# Patient Record
Sex: FEMALE | Race: BLACK OR AFRICAN AMERICAN | NOT HISPANIC OR LATINO | URBAN - NONMETROPOLITAN AREA
[De-identification: names, ages, dates, MRNs, and addresses within clinical notes are randomized per-mention and may not be internally consistent; named-entity substitution may affect disease eponyms.]

---

## 2023-11-30 LAB
C TRACH RRNA SPEC DONR QL NAA+PROBE: POSITIVE
N GONORRHOEA DNA SPEC QL NAA+PROBE: POSITIVE

## 2023-12-18 LAB
BACTERIA SPEC AEROBE CULT: ABNORMAL
EXTERNAL ABO GROUPING: NORMAL
EXTERNAL ANTIBODY SCREEN: NORMAL
EXTERNAL HEMATOCRIT: 31 %
EXTERNAL HEMOGLOBIN: 10.4 G/DL
EXTERNAL HEPATITIS B SURFACE ANTIGEN: NEGATIVE
EXTERNAL PLATELET COUNT: 247 K/ΜL
EXTERNAL RH FACTOR: POSITIVE
EXTERNAL SYPHILIS RPR SCREEN: NORMAL
HCV AB S/CO SERPL IA: NEGATIVE
HIV 1+2 AB+HIV1 P24 AG SERPL QL IA: NORMAL
RUBV IGG SERPL IA-ACNC: 2.28

## 2024-01-24 ENCOUNTER — ANCILLARY ORDERS (OUTPATIENT)
Dept: OBSTETRICS AND GYNECOLOGY | Facility: CLINIC | Age: 24
End: 2024-01-24

## 2024-01-24 ENCOUNTER — INITIAL PRENATAL (OUTPATIENT)
Dept: OBSTETRICS AND GYNECOLOGY | Facility: CLINIC | Age: 24
End: 2024-01-24
Payer: COMMERCIAL

## 2024-01-24 VITALS — DIASTOLIC BLOOD PRESSURE: 70 MMHG | SYSTOLIC BLOOD PRESSURE: 104 MMHG | WEIGHT: 224 LBS

## 2024-01-24 DIAGNOSIS — Z34.82 PRENATAL CARE, SUBSEQUENT PREGNANCY, SECOND TRIMESTER: Primary | ICD-10-CM

## 2024-01-24 DIAGNOSIS — O36.80X0 ENCOUNTER TO DETERMINE FETAL VIABILITY OF PREGNANCY, SINGLE OR UNSPECIFIED FETUS: Primary | ICD-10-CM

## 2024-01-24 RX ORDER — PNV NO.95/FERROUS FUM/FOLIC AC 28MG-0.8MG
1 TABLET ORAL DAILY
Qty: 30 TABLET | Refills: 11 | Status: SHIPPED | OUTPATIENT
Start: 2024-01-24

## 2024-01-24 NOTE — LETTER
January 24, 2024       No Recipients    Patient: Carmelita Powers   YOB: 2000   Date of Visit: 1/24/2024     Dear Carmelita Powers:       Carmelita is a patient of mine and is currently 15 weeks pregnant. She said she is struggling at work with heavy lifting.  Due to pregnancy, I would like her to not lift anything more than 20 pounds.      If you have questions, please do not hesitate to call me.          Sincerely,        Ally Ocampo MD        CC:   No Recipients

## 2024-01-24 NOTE — PROGRESS NOTES
[facility]   HISTORY AND PHYSICAL  Subjective   Subjective     Chief Complaint:   Chief Complaint   Patient presents with    Initial Prenatal Visit     Patient here today as transfer from [facility] [place]. LMP 10/1023.   Patient reports she had a pap at NOB appointment at [facility] [place].        History of Present Illness  Carmelita Powers is a 23 y.o. female  who presents for new ob visit. Pt has had two visits in [place] but pt has moved here. 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 She is having SOB at work and LBP.  Pt with prior  at term without complications. Does not want NIPS/carrier screening.  Pt healthy.     Review of Systems   Constitutional:  Positive for appetite change and fatigue. Negative for activity change.   Respiratory:  Positive for shortness of breath. Negative for cough, chest tightness and wheezing.    Cardiovascular:  Negative for chest pain and palpitations.   Gastrointestinal: Negative.    Genitourinary: Negative.         Personal History     OB History    Para Term  AB Living   2 1 1 0 0 1   SAB IAB Ectopic Molar Multiple Live Births   0 0 0 0 0 0      # Outcome Date GA Lbr Marco A/2nd Weight Sex Delivery Anes PTL Lv   2 Current            1 Term 24   2863 g (6 lb 5 oz) M Vag-Spont        History reviewed. No pertinent past medical history.  Past Surgical History:   Procedure Laterality Date    TONSILLECTOMY         Home Medications:  prenatal vitamin 28-0.8    Allergies:  She has No Known Allergies.    Objective    Objective     Vitals:   BP: (104)/(70) 104/70    Physical Exam  deferred  Result Review    [unfilled]    Assessment & Plan   Assessment / Plan     Diagnoses and all orders for this visit:    1. Prenatal care, subsequent pregnancy, second trimester (Primary)  -     ABO / Rh  -     Ambulatory Referral to M/Perinatology  -     Antibody Screen  -     Chlamydia trachomatis, Neisseria gonorrhoeae, PCR w/ confirmation - Urine, Urine, Clean  Catch  -     CBC & Differential  -     Hepatitis B Surface Antigen  -     Hepatitis C Antibody  -     HIV-1 / O / 2 Ag / Antibody  -     RPR  -     Rubella Antibody, IgG  -     ToxASSURE Select 13 (MW) - Urine, Clean Catch  -     Urine Culture - Urine, Urine, Clean Catch  IUP at 15 weeks gestation by US today.  PNL today.  Note for work for no lifting more than 20 pounds. RTC 4 weeks. Declined NIPS/carrier screening.   Other orders  -     Prenatal Vit-Fe Fumarate-FA (prenatal vitamin 28-0.8) 28-0.8 MG tablet tablet; Take 1 tablet by mouth Daily.  Dispense: 30 tablet; Refill: 11        Discussion:   New OB Visit. US ordered today, reviewed and shows 15 1/7 weeks gestation, EDC 7/16/2024.  Prior uncomplicated pregnancy and vaginal delivery.  Feeling well.   New OB labs ordered today  Discussed OTC Unisom and B6  Discussed COVID vaccine, Tdap, and flu vaccine recommendations  Discussed ffDNA screening option and pt declined  Discussed normal prenatal routines  Questions answered      Return in about 4 weeks (around 2/21/2024) for PO for labs/prenatal records in [place] [facility].    Ally Ocampo MD

## 2024-01-29 ENCOUNTER — PATIENT OUTREACH (OUTPATIENT)
Dept: LABOR AND DELIVERY | Facility: HOSPITAL | Age: 24
End: 2024-01-29
Payer: COMMERCIAL

## 2024-01-29 ENCOUNTER — REFERRAL TRIAGE (OUTPATIENT)
Dept: LABOR AND DELIVERY | Facility: HOSPITAL | Age: 24
End: 2024-01-29
Payer: COMMERCIAL

## 2024-01-29 NOTE — OUTREACH NOTE
[facility]  Unable to Reach       Questions/Answers      Flowsheet Row Responses   Pending Outreach Confirm Patient Interest   Call Attempt First   Outcome No answer/busy, Left message, Embarkehart message sent to patient                Lorene Márquez RN  Maternity Nurse Navigator    1/29/2024, 15:07 CST

## 2024-01-29 NOTE — OUTREACH NOTE
[facility]  Enrollment    Current Estimated Gestational Age: 15w6d    Questions/Answers      Flowsheet Row Responses   Would like to participate? Yes   Date of Intake Visit 01/29/24            [facility]  Intake    Current Estimated Gestational Age: 15w6d    Intake Assessment      Flowsheet Row Responses   Best Method for Contacting Cell   Currently Employed Yes   Able to keep appointments as scheduled Yes   Gender(s) and Name(s) female   Do you have a dentist? Yes   Have you seen a dentist in the last 6 months Yes   Resources Presently Utilizing: None   Maternal Warning Signs Provided   Other: Provided   Other Education HANDS, How to find a dentist, How to find a pediatrician, How to find a primary care provider, Insurance benefits/Incentives, Meds to Beds, Mental Health Services, Smoking/Vaping Cessation, SNAP Benefits, Substance Use Disorder Treatment, Transportation Assistance, WIC Benefits            Learning Assessment      Flowsheet Row Responses   Relationship Patient   Does the learner have any barriers to learning? No Barriers   What is the preferred language of the learner for medical teaching? English   How does the learner prefer to learn new concepts? Listening, Reading, Demonstration, Pictures/Video            SDOH updated and reviewed with the patient during this program:  --     Alcohol Use: Not At Risk (1/29/2024)    AUDIT-C     Frequency of Alcohol Consumption: Never     Average Number of Drinks: Patient does not drink     Frequency of Binge Drinking: Never      --     Depression: Not at risk (1/29/2024)    PHQ-2     PHQ-2 Score: 0      --     Disabilities: Not At Risk (1/29/2024)    Disabilities     Concentrating, Remembering, or Making Decisions Difficulty: no     Doing Errands Independently Difficulty: no      --     Employment: Not At Risk (1/29/2024)    Employment     Do you want help finding or keeping work or a job?: I do not need or want help      Financial Resource  Strain: Low Risk  (1/29/2024)    Overall Financial Resource Strain (CARDIA)     Difficulty of Paying Living Expenses: Not very hard      --     Food Insecurity: No Food Insecurity (1/29/2024)    Hunger Vital Sign     Worried About Running Out of Food in the Last Year: Never true     Ran Out of Food in the Last Year: Never true      --     Health Literacy: Not At Risk (1/29/2024)    Education     Help with school or training?: No     Preferred Language: English      --     Housing Stability: Not At Risk (1/29/2024)    Housing Stability     Current Living Arrangements: apartment     Potentially Unsafe Housing Conditions: none      --     Interpersonal Safety: Unknown (1/29/2024)    Abuse Screen     Unsafe at Home or Work/School: no     Feels Threatened by Someone?: no     Does Anyone Keep You from Contacting Others or Doint Things Outside the Home?: no      --     Physical Activity: Inactive (1/29/2024)    Exercise Vital Sign     Days of Exercise per Week: 0 days     Minutes of Exercise per Session: 0 min      --     Social Connections: Not At Risk (1/29/2024)    Family and Community Support     Help with Day-to-Day Activities: I don't need any help     Lonely or Isolated: Never      --     Transportation Needs: No Transportation Needs (1/29/2024)    PRAPARE - Transportation     Lack of Transportation (Medical): No     Lack of Transportation (Non-Medical): No      --     Utilities: Not At Risk (1/29/2024)    [facility] Utilities     Threatened with loss of utilities: No   Resource letter and prenatal education sent to client in [facility].    Referral submitted to the following resources (verbal consent received to submit demographic information):     HANDS    Tobacco, Alcohol, and Drug History     reports that she has never smoked. She has never used smokeless tobacco.   reports that she does not currently use alcohol.   reports no history of drug use.    Lorene Márquez RN  Maternity Nurse Navigator    1/29/2024, 15:22  IVA NGUYEN - RN  Maternity Nurse Navigator    1/29/2024, 15:22 CST

## 2024-02-21 ENCOUNTER — ROUTINE PRENATAL (OUTPATIENT)
Dept: OBSTETRICS AND GYNECOLOGY | Age: 24
End: 2024-02-21
Payer: COMMERCIAL

## 2024-02-21 DIAGNOSIS — R51.9 HEADACHE IN PREGNANCY, ANTEPARTUM: ICD-10-CM

## 2024-02-21 DIAGNOSIS — O26.899 HEADACHE IN PREGNANCY, ANTEPARTUM: ICD-10-CM

## 2024-02-21 DIAGNOSIS — O10.919 CHRONIC HYPERTENSION DURING PREGNANCY: ICD-10-CM

## 2024-02-21 DIAGNOSIS — Z3A.19 19 WEEKS GESTATION OF PREGNANCY: Primary | ICD-10-CM

## 2024-02-21 DIAGNOSIS — Z71.85 IMMUNIZATION COUNSELING: ICD-10-CM

## 2024-02-21 DIAGNOSIS — Z34.82 MULTIGRAVIDA IN SECOND TRIMESTER: ICD-10-CM

## 2024-02-21 DIAGNOSIS — Z28.21 INFLUENZA VACCINATION DECLINED: ICD-10-CM

## 2024-02-21 PROBLEM — Z34.90 PREGNANCY: Status: ACTIVE | Noted: 2024-02-21

## 2024-02-21 LAB
GLUCOSE UR STRIP-MCNC: NEGATIVE MG/DL
PROT UR STRIP-MCNC: ABNORMAL MG/DL

## 2024-02-21 RX ORDER — METOCLOPRAMIDE 10 MG/1
10 TABLET ORAL 4 TIMES DAILY
Qty: 120 TABLET | Refills: 1 | Status: SHIPPED | OUTPATIENT
Start: 2024-02-21

## 2024-02-21 RX ORDER — ASPIRIN 81 MG/1
81 TABLET ORAL DAILY
Qty: 90 TABLET | Refills: 3 | Status: SHIPPED | OUTPATIENT
Start: 2024-02-21

## 2024-02-26 VITALS
BODY MASS INDEX: 38.41 KG/M2 | HEIGHT: 64 IN | WEIGHT: 225 LBS | SYSTOLIC BLOOD PRESSURE: 128 MMHG | DIASTOLIC BLOOD PRESSURE: 76 MMHG

## 2024-02-26 PROBLEM — O10.919 CHRONIC HYPERTENSION IN OBSTETRIC CONTEXT: Status: ACTIVE | Noted: 2024-02-26

## 2024-03-20 ENCOUNTER — TELEPHONE (OUTPATIENT)
Dept: OBSTETRICS AND GYNECOLOGY | Age: 24
End: 2024-03-20
Payer: COMMERCIAL

## 2024-03-28 ENCOUNTER — HOSPITAL ENCOUNTER (EMERGENCY)
Facility: HOSPITAL | Age: 24
Discharge: HOME | End: 2024-03-28
Attending: EMERGENCY MEDICINE
Payer: COMMERCIAL

## 2024-03-28 VITALS
DIASTOLIC BLOOD PRESSURE: 96 MMHG | SYSTOLIC BLOOD PRESSURE: 134 MMHG | OXYGEN SATURATION: 100 % | WEIGHT: 226.5 LBS | BODY MASS INDEX: 38.67 KG/M2 | RESPIRATION RATE: 18 BRPM | TEMPERATURE: 98.3 F | HEIGHT: 64 IN | HEART RATE: 79 BPM

## 2024-03-28 DIAGNOSIS — R11.2 NAUSEA AND VOMITING, UNSPECIFIED VOMITING TYPE: ICD-10-CM

## 2024-03-28 DIAGNOSIS — N39.0 ACUTE UTI (URINARY TRACT INFECTION): Primary | ICD-10-CM

## 2024-03-28 DIAGNOSIS — Z34.90 PREGNANCY, UNSPECIFIED GESTATIONAL AGE: ICD-10-CM

## 2024-03-28 LAB
ALBUMIN SERPL-MCNC: 3.4 G/DL (ref 3.5–5.2)
ALBUMIN/GLOB SERPL: 1 G/DL
ALP SERPL-CCNC: 55 U/L (ref 39–117)
ALT SERPL W P-5'-P-CCNC: 6 U/L (ref 1–33)
ANION GAP SERPL CALCULATED.3IONS-SCNC: 10 MMOL/L (ref 5–15)
AST SERPL-CCNC: 12 U/L (ref 1–32)
BACTERIA UR QL AUTO: ABNORMAL /HPF
BILIRUB SERPL-MCNC: 0.4 MG/DL (ref 0–1.2)
BILIRUB UR QL STRIP: NEGATIVE
BUN SERPL-MCNC: 8 MG/DL (ref 6–20)
BUN/CREAT SERPL: 10.8 (ref 7–25)
CALCIUM SPEC-SCNC: 9.1 MG/DL (ref 8.6–10.5)
CHLORIDE SERPL-SCNC: 102 MMOL/L (ref 98–107)
CLARITY UR: ABNORMAL
CO2 SERPL-SCNC: 24 MMOL/L (ref 22–29)
COLOR UR: YELLOW
CREAT SERPL-MCNC: 0.74 MG/DL (ref 0.57–1)
DEPRECATED RDW RBC AUTO: 38.7 FL (ref 37–54)
EGFRCR SERPLBLD CKD-EPI 2021: 117.5 ML/MIN/1.73
EOSINOPHIL # BLD MANUAL: 0.2 10*3/MM3 (ref 0–0.4)
EOSINOPHIL NFR BLD MANUAL: 2 % (ref 0.3–6.2)
ERYTHROCYTE [DISTWIDTH] IN BLOOD BY AUTOMATED COUNT: 13.7 % (ref 12.3–15.4)
GLOBULIN UR ELPH-MCNC: 3.3 GM/DL
GLUCOSE SERPL-MCNC: 81 MG/DL (ref 65–99)
GLUCOSE UR STRIP-MCNC: NEGATIVE MG/DL
HCT VFR BLD AUTO: 34 % (ref 34–46.6)
HGB BLD-MCNC: 10.9 G/DL (ref 12–15.9)
HGB UR QL STRIP.AUTO: NEGATIVE
HYALINE CASTS UR QL AUTO: ABNORMAL /LPF
KETONES UR QL STRIP: ABNORMAL
LEUKOCYTE ESTERASE UR QL STRIP.AUTO: ABNORMAL
LYMPHOCYTES # BLD MANUAL: 1.1 10*3/MM3 (ref 0.7–3.1)
LYMPHOCYTES NFR BLD MANUAL: 2 % (ref 5–12)
MCH RBC QN AUTO: 24.8 PG (ref 26.6–33)
MCHC RBC AUTO-ENTMCNC: 32.1 G/DL (ref 31.5–35.7)
MCV RBC AUTO: 77.3 FL (ref 79–97)
MICROCYTES BLD QL: ABNORMAL
MONOCYTES # BLD: 0.2 10*3/MM3 (ref 0.1–0.9)
NEUTROPHILS # BLD AUTO: 8.29 10*3/MM3 (ref 1.7–7)
NEUTROPHILS NFR BLD MANUAL: 84.7 % (ref 42.7–76)
NITRITE UR QL STRIP: NEGATIVE
OVALOCYTES BLD QL SMEAR: ABNORMAL
PH UR STRIP.AUTO: 7 [PH] (ref 5–8)
PLAT MORPH BLD: NORMAL
PLATELET # BLD AUTO: 227 10*3/MM3 (ref 140–450)
PMV BLD AUTO: 12 FL (ref 6–12)
POIKILOCYTOSIS BLD QL SMEAR: ABNORMAL
POTASSIUM SERPL-SCNC: 3.6 MMOL/L (ref 3.5–5.2)
PROT SERPL-MCNC: 6.7 G/DL (ref 6–8.5)
PROT UR QL STRIP: ABNORMAL
RBC # BLD AUTO: 4.4 10*6/MM3 (ref 3.77–5.28)
RBC # UR STRIP: ABNORMAL /HPF
REF LAB TEST METHOD: ABNORMAL
SODIUM SERPL-SCNC: 136 MMOL/L (ref 136–145)
SP GR UR STRIP: 1.03 (ref 1–1.03)
SQUAMOUS #/AREA URNS HPF: ABNORMAL /HPF
UROBILINOGEN UR QL STRIP: ABNORMAL
VARIANT LYMPHS NFR BLD MANUAL: 5.1 % (ref 0–5)
VARIANT LYMPHS NFR BLD MANUAL: 6.1 % (ref 19.6–45.3)
WBC # UR STRIP: ABNORMAL /HPF
WBC CLUMPS # UR AUTO: ABNORMAL /HPF
WBC MORPH BLD: NORMAL
WBC NRBC COR # BLD AUTO: 9.79 10*3/MM3 (ref 3.4–10.8)

## 2024-03-28 PROCEDURE — 99283 EMERGENCY DEPT VISIT LOW MDM: CPT

## 2024-03-28 PROCEDURE — 25010000002 CEFTRIAXONE PER 250 MG: Performed by: EMERGENCY MEDICINE

## 2024-03-28 PROCEDURE — 80053 COMPREHEN METABOLIC PANEL: CPT | Performed by: EMERGENCY MEDICINE

## 2024-03-28 PROCEDURE — 85025 COMPLETE CBC W/AUTO DIFF WBC: CPT | Performed by: EMERGENCY MEDICINE

## 2024-03-28 PROCEDURE — 25810000003 SODIUM CHLORIDE 0.9 % SOLUTION: Performed by: EMERGENCY MEDICINE

## 2024-03-28 PROCEDURE — 81001 URINALYSIS AUTO W/SCOPE: CPT | Performed by: EMERGENCY MEDICINE

## 2024-03-28 PROCEDURE — 96365 THER/PROPH/DIAG IV INF INIT: CPT

## 2024-03-28 PROCEDURE — P9612 CATHETERIZE FOR URINE SPEC: HCPCS

## 2024-03-28 PROCEDURE — 85007 BL SMEAR W/DIFF WBC COUNT: CPT | Performed by: EMERGENCY MEDICINE

## 2024-03-28 PROCEDURE — 87086 URINE CULTURE/COLONY COUNT: CPT | Performed by: EMERGENCY MEDICINE

## 2024-03-28 PROCEDURE — 25010000002 ONDANSETRON PER 1 MG: Performed by: EMERGENCY MEDICINE

## 2024-03-28 PROCEDURE — 96375 TX/PRO/DX INJ NEW DRUG ADDON: CPT

## 2024-03-28 RX ORDER — CEFDINIR 300 MG/1
300 CAPSULE ORAL 2 TIMES DAILY
Qty: 20 CAPSULE | Refills: 0 | Status: SHIPPED | OUTPATIENT
Start: 2024-03-28 | End: 2024-04-07

## 2024-03-28 RX ORDER — SODIUM CHLORIDE 0.9 % (FLUSH) 0.9 %
10 SYRINGE (ML) INJECTION AS NEEDED
Status: DISCONTINUED | OUTPATIENT
Start: 2024-03-28 | End: 2024-03-28 | Stop reason: HOSPADM

## 2024-03-28 RX ORDER — ONDANSETRON 2 MG/ML
4 INJECTION INTRAMUSCULAR; INTRAVENOUS ONCE
Status: COMPLETED | OUTPATIENT
Start: 2024-03-28 | End: 2024-03-28

## 2024-03-28 RX ADMIN — SODIUM CHLORIDE 1000 ML: 9 INJECTION, SOLUTION INTRAVENOUS at 07:48

## 2024-03-28 RX ADMIN — SODIUM CHLORIDE 1000 MG: 900 INJECTION INTRAVENOUS at 10:22

## 2024-03-28 RX ADMIN — ONDANSETRON 4 MG: 2 INJECTION INTRAMUSCULAR; INTRAVENOUS at 07:49

## 2024-03-28 NOTE — ED PROVIDER NOTES
Subjective   History of Present Illness  Patient with vomiting and nausea no abdominal pain she is pregnant denies any diarrhea denies any chest pain denies shortness of breath.  Came to the ER for couple complaints.  She denies any chest pain denies any shortness of breath denies any dizziness.  Just nausea and the vomiting.  And feeling weak.  No vaginal discharge no vaginal bleeding    Vomiting  The primary symptoms include nausea and vomiting. Primary symptoms do not include fever, weight loss, fatigue, abdominal pain, diarrhea, melena, hematemesis, jaundice, hematochezia, dysuria, myalgias, arthralgias or rash. The illness began yesterday.   The illness does not include chills, anorexia, bloating, constipation or back pain. Associated medical issues do not include inflammatory bowel disease, GERD, liver disease, gastric bypass, bowel resection or irritable bowel syndrome.   Nausea  The primary symptoms include nausea and vomiting. Primary symptoms do not include fever, weight loss, fatigue, abdominal pain, diarrhea, melena, hematemesis, jaundice, hematochezia, dysuria, myalgias, arthralgias or rash.   The illness does not include chills, anorexia, bloating, constipation or back pain. Associated medical issues do not include inflammatory bowel disease, GERD, liver disease, gastric bypass, bowel resection or irritable bowel syndrome.       Review of Systems   Constitutional: Negative.  Negative for activity change, appetite change, chills, diaphoresis, fatigue, fever and weight loss.   HENT:  Negative for congestion, drooling, ear pain, facial swelling, hearing loss, sinus pressure and sore throat.    Eyes: Negative.  Negative for discharge.   Gastrointestinal:  Positive for nausea and vomiting. Negative for abdominal distention, abdominal pain, anorexia, bloating, blood in stool, constipation, diarrhea, hematemesis, hematochezia, jaundice and melena.   Endocrine: Negative.  Negative for cold intolerance, heat  intolerance, polydipsia, polyphagia and polyuria.   Genitourinary: Negative.  Negative for dysuria, flank pain and urgency.   Musculoskeletal: Negative.  Negative for arthralgias, back pain, myalgias and neck stiffness.   Skin: Negative.  Negative for color change, pallor and rash.   Allergic/Immunologic: Negative.    Neurological: Negative.  Negative for dizziness, seizures, speech difficulty, weakness, numbness and headaches.   Hematological: Negative.  Negative for adenopathy.   All other systems reviewed and are negative.      Past Medical History:   Diagnosis Date    Migraine 1/19/24    I get migraines really bad all throughout the day and night       No Known Allergies    Past Surgical History:   Procedure Laterality Date    TONSILLECTOMY         Family History   Problem Relation Age of Onset    Breast cancer Neg Hx     Ovarian cancer Neg Hx     Uterine cancer Neg Hx     Colon cancer Neg Hx     Melanoma Neg Hx        Social History     Socioeconomic History    Marital status: Single   Tobacco Use    Smoking status: Never    Smokeless tobacco: Never   Vaping Use    Vaping status: Never Used   Substance and Sexual Activity    Alcohol use: Not Currently    Drug use: Never    Sexual activity: Not Currently     Partners: Male     Birth control/protection: None           Objective   Physical Exam  Vitals and nursing note reviewed. Exam conducted with a chaperone present.   Constitutional:       General: She is not in acute distress.     Appearance: Normal appearance.   HENT:      Head: Normocephalic.      Nose: Nose normal.      Mouth/Throat:      Mouth: Mucous membranes are moist.   Eyes:      Pupils: Pupils are equal, round, and reactive to light.   Cardiovascular:      Rate and Rhythm: Normal rate. No extrasystoles are present.     Chest Wall: PMI is not displaced.      Pulses: Normal pulses.      Heart sounds: Normal heart sounds. No murmur heard.     No gallop.   Pulmonary:      Effort: Pulmonary effort is  normal. No tachypnea, respiratory distress or retractions.      Breath sounds: No stridor or decreased air movement. No decreased breath sounds or rales.   Chest:      Chest wall: No tenderness.   Abdominal:      General: Abdomen is protuberant. Bowel sounds are normal. There is no distension or abdominal bruit.      Palpations: Abdomen is soft. There is no mass or pulsatile mass.      Tenderness: There is no abdominal tenderness. There is no right CVA tenderness, left CVA tenderness, guarding or rebound. Negative signs include Olvera's sign and Rovsing's sign.   Musculoskeletal:      Cervical back: Normal range of motion. No rigidity.      Right lower leg: No edema.      Left lower leg: No edema.   Skin:     General: Skin is warm.      Capillary Refill: Capillary refill takes less than 2 seconds.      Coloration: Skin is not cyanotic, mottled or pale.   Neurological:      General: No focal deficit present.      Mental Status: She is alert and oriented to person, place, and time. Mental status is at baseline.      Cranial Nerves: No cranial nerve deficit.      Sensory: No sensory deficit.      Motor: No weakness.      Coordination: Coordination normal.   Psychiatric:         Mood and Affect: Mood normal.         Thought Content: Thought content normal.         Procedures           ED Course  ED Course as of 03/28/24 1012   Thu Mar 28, 2024   1005 Patient has not vomited since she has been in the ED white cell count is normal chemistry within normal limits.  No dehydration UTI is noted on the specimen.  Heart tones are going to be obtained Dr. Calderón  came and saw the patient recommendation is the patient can be discharged home [TS]      ED Course User Index  [TS] Ricardo Byrne MD                                             Medical Decision Making  Nausea and vomiting patient is pregnant 24 weeks.  No history of diarrhea no history of melena no hematochezia no vaginal bleeding.  Denies any chest pain or shortness  of breath.  There is no fever.  There is no headaches.  No history of recreational drug use.  Patient not hypertensive there is no history of eclampsia or preeclampsia in her.    Problems Addressed:  Acute UTI (urinary tract infection): acute illness or injury     Details: Acute UTI no pyelonephritis no sepsis.  Nausea and vomiting, unspecified vomiting type:     Details: Secondary probably to UTI also she is pregnant already on Reglan.  Was given antiemetics in the ED and IV fluids.  Pregnancy, unspecified gestational age:     Details: Patient was will get fetal heart tones and discharged home after that.    Risk  Prescription drug management.  Risk Details: No further vomiting in the ED.  No nausea no vomiting at this time.  Does not have any evidence of sepsis no pyelonephritis hemodynamically stable.  Has got a UTI no abdominal pain seen by GYN.  Patient was discharged home.  Recommended in the ER for any worsening symptoms.  Denies any abdominal pain        Final diagnoses:   Acute UTI (urinary tract infection)   Nausea and vomiting, unspecified vomiting type   Pregnancy, unspecified gestational age       ED Disposition  ED Disposition       ED Disposition   Discharge    Condition   Stable    Comment   --               No follow-up provider specified.       Medication List        New Prescriptions      cefdinir 300 MG capsule  Commonly known as: OMNICEF  Take 1 capsule by mouth 2 (Two) Times a Day for 10 days.               Where to Get Your Medications        These medications were sent to [facility]/pharmacy #[id number] - [place] - [address] AT ACROSS FROM [address] - [phone]  - [phone]   [address] [facility] [address]      Phone: [phone]   cefdinir 300 MG capsule            Ricardo Byrne MD  03/28/24 1012

## 2024-03-28 NOTE — NON STRESS TEST
Obstetrical Non-stress Test Interpretation     Name:  Carmelita Powers  MRN: [id number]    24 y.o. female  at 24w2d    Indication: nausea and vomiting      Baseline: Normal 110-160 bpm  Variability:   Moderate/Normal (amplitude 6-25 bpm)  Accelerations: Present (<32 weeks) 10 x 10 bpm   Decelerations: Absent   Contractions:  Absent       Impression:  Category I       Reza Calderón MD  3/28/2024  11:48 CDT

## 2024-03-29 LAB — BACTERIA SPEC AEROBE CULT: NORMAL

## 2024-04-18 ENCOUNTER — ROUTINE PRENATAL (OUTPATIENT)
Age: 24
End: 2024-04-18
Payer: COMMERCIAL

## 2024-04-18 VITALS — WEIGHT: 226 LBS | BODY MASS INDEX: 38.79 KG/M2 | DIASTOLIC BLOOD PRESSURE: 84 MMHG | SYSTOLIC BLOOD PRESSURE: 138 MMHG

## 2024-04-18 DIAGNOSIS — O10.919 CHRONIC HYPERTENSION DURING PREGNANCY: ICD-10-CM

## 2024-04-18 DIAGNOSIS — Z71.85 IMMUNIZATION COUNSELING: ICD-10-CM

## 2024-04-18 DIAGNOSIS — Z11.3 SCREENING EXAMINATION FOR STI: ICD-10-CM

## 2024-04-18 DIAGNOSIS — Z13.0 SCREENING FOR DEFICIENCY ANEMIA: ICD-10-CM

## 2024-04-18 DIAGNOSIS — Z3A.27 27 WEEKS GESTATION OF PREGNANCY: Primary | ICD-10-CM

## 2024-04-18 DIAGNOSIS — Z34.82 MULTIGRAVIDA IN SECOND TRIMESTER: ICD-10-CM

## 2024-04-18 DIAGNOSIS — Z13.1 SPECIAL SCREENING EXAMINATION FOR DIABETES MELLITUS: ICD-10-CM

## 2024-04-18 LAB
GLUCOSE UR STRIP-MCNC: NEGATIVE MG/DL
PROT UR STRIP-MCNC: ABNORMAL MG/DL

## 2024-04-18 PROCEDURE — 0502F SUBSEQUENT PRENATAL CARE: CPT | Performed by: ADVANCED PRACTICE MIDWIFE

## 2024-04-18 RX ORDER — VITAMIN A, ASCORBIC ACID, CHOLECALCIFEROL, TOCOPHEROL, THIAMINE MONONITRATE, RIBOFLAVIN, PYRIDOXINE, FOLIC ACID, CYANOCOBALAMIN, CALCIUM CARBONATE, FERROUS FUMARATE, ZINC OXIDE, CUPRIC OXIDE, NIACINAMIDE, AND FISH OIL 27-1-250MG
1 KIT ORAL DAILY
Qty: 30 EACH | Refills: 5 | Status: SHIPPED | OUTPATIENT
Start: 2024-04-18

## 2024-04-18 NOTE — PROGRESS NOTES
Reason for visit: Routine OB visit at 27w2d     CC:  She is feeling good today. She has been drinking a lot of sodas this pregnancy, and had a UTI a few weeks ago. Endorses good fetal movement. Denies VB, LOF, contractions, h/a, visual changes, and right upper quadrant pain.     ROS: All systems reviewed and are negative.    Wt 103 kg (226 lb) lb for a TWG of -4.536 kg (-10 lb), /84, FHTs 144, FH 30 cm  Urine today and reviewed: negative glucose, 1+ protein    20-week Anatomy scan: EFW 39%, normal; anterior placenta without evidence of placenta previa    Exam:  General Appearance:  No visualized signs of distress. Normal mood and behavior  Cardiorespiratory: HR str and reg. Lungs clear. Resp even and unlabored.  Abdomen: is soft and nontender. No CVA tenderness. Uterus is consistent with estimated gestational age.  Ext: No edema. Calves non-tender.     Impression  Diagnoses and all orders for this visit:    1. 27 weeks gestation of pregnancy (Primary)  -     POC Urinalysis Dipstick  -     Gestational Screen 1 Hr ([facility])  -     Hemoglobin  -     RPR  -     CBC & Differential  -     Comprehensive Metabolic Panel  -     Uric Acid  -     Lactate Dehydrogenase  -     Protein / Creatinine Ratio, Urine - Urine, Clean Catch  -     Prenatal Vit-Fe Fum-FA-Omega (PNV Prenatal Plus Multivit+DHA) 27-1 & 312 MG misc; Take 1 tablet by mouth Daily.  Dispense: 30 each; Refill: 5    2. Multigravida in second trimester  Discussed third trimester of pregnancy, including discomforts and measures of support,  labor warnings, preeclampsia warnings, and signs and symptoms to report. Discussed glucose and hemoglobin screenings today in the clinic. Labs ordered today. Patient to notify provider and/or come to the hospital for vaginal bleeding, leaking of fluid, contractions, or other concerns. Third Trimester of Pregnancy and Alternative Pain Management During Labor and Delivery videos included in the AVS.  -     POC  Urinalysis Dipstick    3. Chronic hypertension during pregnancy  1+ proteinuria today. Plan for labs today. No signs of preeclampsia noted. Discussed blood pressure warning signs.   -     CBC & Differential  -     Comprehensive Metabolic Panel  -     Uric Acid  -     Lactate Dehydrogenase  -     Protein / Creatinine Ratio, Urine - Urine, Clean Catch    4. Special screening examination for diabetes mellitus  -     Gestational Screen 1 Hr ([facility])    5. Screening for deficiency anemia  -     Hemoglobin    6. Screening examination for STI  -     RPR    7. Immunization counseling  Reviewed Tdap immunization recommendations during pregnancy. She will consider receiving the Tdap immunization during an upcoming prenatal visit. Tdap (Tetanus, Diptheria, Pertussis) Vaccine: What You Need to Know (VIS) education included in the AVS.        Return to the office in 2 weeks for routine prenatal visit with Dr. Ocampo as scheduled and as needed with concerns.        This note has been signed electronically.    Swati Gallo, DNP, APRN, CNM, RNC-OB

## 2024-04-19 LAB
ALBUMIN SERPL-MCNC: 3.3 G/DL (ref 4–5)
ALBUMIN/GLOB SERPL: 1.3 {RATIO} (ref 1.2–2.2)
ALP SERPL-CCNC: 58 IU/L (ref 44–121)
ALT SERPL-CCNC: 8 IU/L (ref 0–32)
AST SERPL-CCNC: 12 IU/L (ref 0–40)
BASOPHILS # BLD AUTO: 0 X10E3/UL (ref 0–0.2)
BASOPHILS NFR BLD AUTO: 0 %
BILIRUB SERPL-MCNC: 0.3 MG/DL (ref 0–1.2)
BUN SERPL-MCNC: 7 MG/DL (ref 6–20)
BUN/CREAT SERPL: 11 (ref 9–23)
CALCIUM SERPL-MCNC: 8.8 MG/DL (ref 8.7–10.2)
CHLORIDE SERPL-SCNC: 103 MMOL/L (ref 96–106)
CO2 SERPL-SCNC: 21 MMOL/L (ref 20–29)
CREAT SERPL-MCNC: 0.61 MG/DL (ref 0.57–1)
CREAT UR-MCNC: 187.8 MG/DL
EGFRCR SERPLBLD CKD-EPI 2021: 130 ML/MIN/1.73
EOSINOPHIL # BLD AUTO: 0.1 X10E3/UL (ref 0–0.4)
EOSINOPHIL NFR BLD AUTO: 1 %
ERYTHROCYTE [DISTWIDTH] IN BLOOD BY AUTOMATED COUNT: 13.3 % (ref 11.7–15.4)
GLOBULIN SER CALC-MCNC: 2.5 G/DL (ref 1.5–4.5)
GLUCOSE 1H P 50 G GLC PO SERPL-MCNC: 109 MG/DL (ref 70–139)
GLUCOSE SERPL-MCNC: 107 MG/DL (ref 70–99)
HCT VFR BLD AUTO: 31.3 % (ref 34–46.6)
HGB BLD-MCNC: 10.3 G/DL (ref 11.1–15.9)
HGB BLD-MCNC: 9.9 G/DL (ref 11.1–15.9)
IMM GRANULOCYTES # BLD AUTO: 0 X10E3/UL (ref 0–0.1)
IMM GRANULOCYTES NFR BLD AUTO: 0 %
LDH SERPL L TO P-CCNC: 136 IU/L (ref 119–226)
LYMPHOCYTES # BLD AUTO: 1.6 X10E3/UL (ref 0.7–3.1)
LYMPHOCYTES NFR BLD AUTO: 17 %
MCH RBC QN AUTO: 24.9 PG (ref 26.6–33)
MCHC RBC AUTO-ENTMCNC: 31.6 G/DL (ref 31.5–35.7)
MCV RBC AUTO: 79 FL (ref 79–97)
MONOCYTES # BLD AUTO: 0.5 X10E3/UL (ref 0.1–0.9)
MONOCYTES NFR BLD AUTO: 6 %
NEUTROPHILS # BLD AUTO: 7.2 X10E3/UL (ref 1.4–7)
NEUTROPHILS NFR BLD AUTO: 76 %
PLATELET # BLD AUTO: 203 X10E3/UL (ref 150–450)
POTASSIUM SERPL-SCNC: 3.1 MMOL/L (ref 3.5–5.2)
PROT SERPL-MCNC: 5.8 G/DL (ref 6–8.5)
PROT UR-MCNC: 32.3 MG/DL
PROT/CREAT UR: 172 MG/G CREAT (ref 0–200)
RBC # BLD AUTO: 3.98 X10E6/UL (ref 3.77–5.28)
RPR SER QL: NON REACTIVE
SODIUM SERPL-SCNC: 136 MMOL/L (ref 134–144)
URATE SERPL-MCNC: 4.2 MG/DL (ref 2.6–6.2)
WBC # BLD AUTO: 9.5 X10E3/UL (ref 3.4–10.8)

## 2024-04-21 DIAGNOSIS — O99.019 MATERNAL ANEMIA IN PREGNANCY, ANTEPARTUM: Primary | ICD-10-CM

## 2024-04-23 ENCOUNTER — PATIENT OUTREACH (OUTPATIENT)
Dept: LABOR AND DELIVERY | Facility: HOSPITAL | Age: 24
End: 2024-04-23
Payer: COMMERCIAL

## 2024-04-23 NOTE — OUTREACH NOTE
[facility]  Check-In    Current Estimated Gestational Age: 28w0d    EPDS questionnaire sent to Carmelita in [facility] prior to our telephone check-in this week.     Lorene Márquez RN  Maternity Nurse Navigator    4/23/2024, 16:26 CDT

## 2024-04-24 ENCOUNTER — PATIENT OUTREACH (OUTPATIENT)
Dept: LABOR AND DELIVERY | Facility: HOSPITAL | Age: 24
End: 2024-04-24
Payer: COMMERCIAL

## 2024-04-24 NOTE — OUTREACH NOTE
[facility]  Check-In    Current Estimated Gestational Age: 28w1d      Questions/Answers      Flowsheet Row Responses   Best Method for Contacting Cell   Currently Employed Yes   Able to keep appointments as scheduled Yes   Gender(s) and Name(s) female   Baby Active/Feeling Fetal Movemen Yes   How are you presently feeling? states she is taking extra iron now related to anemia.  reviewed with her what stool softners that she can take if she had any constipation from the extra iron.  encouraged Carmelita to increase water intake.   New Diagnosis Anemia   Resource/Environmental Concerns None   Do you have any questions related to your care experience, your pregnancy, plans for delivery, any concerns, etc? No          Resource letter sent to Carmelita in [place] [employer].     Lorene Márquez RN  Maternity Nurse Navigator    4/24/2024, 12:47 CDT

## 2024-04-25 ENCOUNTER — PATIENT OUTREACH (OUTPATIENT)
Dept: LABOR AND DELIVERY | Facility: HOSPITAL | Age: 24
End: 2024-04-25
Payer: COMMERCIAL

## 2024-04-25 NOTE — OUTREACH NOTE
[facility]  Check-In    Current Estimated Gestational Age: 28w2d      Questions/Answers      Flowsheet Row Responses   Best Method for Contacting Cell   Currently Employed Yes   Able to keep appointments as scheduled Yes   Gender(s) and Name(s) female   How are you presently feeling? Carmelita called to schedule a tour of the OB unit.  We scheduled for May 30th after her 1 PM OB appt.  She was instructed to call my office number [phone] once she is done at her OB appt and I will meet her off the 2nd floor elevators.            Lorene Márquez RN  Maternity Nurse Navigator    4/25/2024, 08:23 CDT

## 2024-05-01 ENCOUNTER — ROUTINE PRENATAL (OUTPATIENT)
Age: 24
End: 2024-05-01
Payer: COMMERCIAL

## 2024-05-01 VITALS — WEIGHT: 225.4 LBS | DIASTOLIC BLOOD PRESSURE: 80 MMHG | SYSTOLIC BLOOD PRESSURE: 132 MMHG | BODY MASS INDEX: 38.69 KG/M2

## 2024-05-01 DIAGNOSIS — Z3A.29 29 WEEKS GESTATION OF PREGNANCY: Primary | ICD-10-CM

## 2024-05-01 DIAGNOSIS — O99.019 MATERNAL ANEMIA IN PREGNANCY, ANTEPARTUM: ICD-10-CM

## 2024-05-01 DIAGNOSIS — O10.919 CHRONIC HYPERTENSION DURING PREGNANCY: ICD-10-CM

## 2024-05-01 PROCEDURE — 90715 TDAP VACCINE 7 YRS/> IM: CPT | Performed by: OBSTETRICS & GYNECOLOGY

## 2024-05-01 PROCEDURE — 0502F SUBSEQUENT PRENATAL CARE: CPT | Performed by: OBSTETRICS & GYNECOLOGY

## 2024-05-01 PROCEDURE — 90471 IMMUNIZATION ADMIN: CPT | Performed by: OBSTETRICS & GYNECOLOGY

## 2024-05-01 NOTE — PROGRESS NOTES
No complaints. No PIH symptoms. GFM. Denies VB, LOF, ctx. Tdap today.    /80   Wt 102 kg (225 lb 6.4 oz)   LMP 10/10/2023 (Exact Date)   BMI 38.69 kg/m²    FHTs 144  Urine unable to void    Diagnoses and all orders for this visit:    1. 29 weeks gestation of pregnancy (Primary)  -     POC Urinalysis Dipstick    2. Chronic hypertension during pregnancy  CHTN no meds. BP stable. US for growth next visit. RTC 2 weeks.   3. Maternal anemia in pregnancy, antepartum  Hg 9.7 started on po FeSO4.  Recheck CBC next visit.

## 2024-05-16 ENCOUNTER — ROUTINE PRENATAL (OUTPATIENT)
Age: 24
End: 2024-05-16
Payer: COMMERCIAL

## 2024-05-16 VITALS — SYSTOLIC BLOOD PRESSURE: 118 MMHG | BODY MASS INDEX: 39.31 KG/M2 | WEIGHT: 229 LBS | DIASTOLIC BLOOD PRESSURE: 84 MMHG

## 2024-05-16 DIAGNOSIS — O10.919 CHRONIC HYPERTENSION DURING PREGNANCY: ICD-10-CM

## 2024-05-16 DIAGNOSIS — O99.019 MATERNAL ANEMIA IN PREGNANCY, ANTEPARTUM: ICD-10-CM

## 2024-05-16 DIAGNOSIS — O98.219 ANTEPARTUM GONORRHEA: ICD-10-CM

## 2024-05-16 DIAGNOSIS — Z3A.31 31 WEEKS GESTATION OF PREGNANCY: Primary | ICD-10-CM

## 2024-05-16 DIAGNOSIS — Z34.83 MULTIGRAVIDA IN THIRD TRIMESTER: ICD-10-CM

## 2024-05-16 DIAGNOSIS — R12 HEARTBURN DURING PREGNANCY IN THIRD TRIMESTER: ICD-10-CM

## 2024-05-16 DIAGNOSIS — O98.819 CHLAMYDIA INFECTION DURING PREGNANCY: ICD-10-CM

## 2024-05-16 DIAGNOSIS — A74.9 CHLAMYDIA INFECTION DURING PREGNANCY: ICD-10-CM

## 2024-05-16 DIAGNOSIS — O26.893 HEARTBURN DURING PREGNANCY IN THIRD TRIMESTER: ICD-10-CM

## 2024-05-16 LAB
GLUCOSE UR STRIP-MCNC: NEGATIVE MG/DL
PROT UR STRIP-MCNC: ABNORMAL MG/DL

## 2024-05-16 PROCEDURE — 0502F SUBSEQUENT PRENATAL CARE: CPT | Performed by: ADVANCED PRACTICE MIDWIFE

## 2024-05-16 RX ORDER — FERROUS SULFATE 324(65)MG
324 TABLET, DELAYED RELEASE (ENTERIC COATED) ORAL
Qty: 30 TABLET | Refills: 3 | Status: SHIPPED | OUTPATIENT
Start: 2024-05-16

## 2024-05-16 NOTE — PROGRESS NOTES
"Reason for visit: Routine OB visit at 31w2d     CC:  Has been having \"real bad heartburn to the point where I throw up every day.\" Endorses good fetal movement. Denies VB, LOF, contractions, h/a, visual changes, and right upper quadrant pain.     ROS: All systems reviewed and are negative with exception of the following: amenorrhea, headaches managed with the baby aspirin,     Weight 104 kg (229 lb); /84; ; FH 32 cm   Total weight gain: -3.175 kg (-7 lb) with Total weight gain expected 5 kg (11 lb)-9 kg (19 lb)    Urine today and reviewed: negative glucose, trace protein    31-week U/S today: EFW 17.4%, 1581 g; AC 23%; KORY 10.1 cm, DVP 4.2 cm; UA S/D 2.2; normal interval growth; vertex; anterior placenta    20-week Anatomy scan: EFW 39%; normal; anterior placenta without evidence of placenta previa    Exam:  General Appearance:  No visualized signs of distress. Normal mood and behavior  Cardiorespiratory: HR str and reg. Lungs clear. Resp even and unlabored.  Abdomen: is soft and nontender. No CVA tenderness. Uterus is consistent with estimated gestational age.  Ext: No edema. Calves non-tender.     Impression  Diagnoses and all orders for this visit:    1. 31 weeks gestation of pregnancy (Primary)  Discussed and encouraged practicing measures of relaxation during the birthing experience.   -     POC Urinalysis Dipstick  -     ferrous sulfate 324 MG tablet delayed-release; Take 1 tablet by mouth Daily With Breakfast.  Dispense: 30 tablet; Refill: 3  -     Chlamydia trachomatis, Neisseria gonorrhoeae, Trichomonas vaginalis, PCR - Urine, Urine, Random Void    2. Multigravida in third trimester  Discussed third trimester of pregnancy, discomforts and measures of support, fetal movement counts,  labor warnings, preeclampsia warnings, and signs and symptoms to report. Reviewed glucose tolerance test and hemoglobin results. Patient to notify provider and/or come to the hospital with vaginal bleeding, " leaking of fluid, contraction, or other concerns. Third Trimester of Pregnancy, Signs and Symptoms of Labor, and Alternative Pain Management During Labor and Delivery videos included in the AVS.    3. Heartburn during pregnancy in third trimester  Reviewed measures of support for heartburn, including reflux precautions, OTC medication (Tums and Pepcid), and then Rx medication if indicated. Patient to notify provider if symptoms persist or worsen.     4. Chronic hypertension during pregnancy  Taking aspirin daily. Not presently on medication.  Random urine protein/creatinine ratio 172 at 27 weeks. Reviewed signs to report. Hypertension During Pregnancy education included in the AVS.     5. Maternal anemia in pregnancy, antepartum  Discussed taking iron supplement as prescribed, but she relates the pharmacy was out of the one previously prescribed. New prescription sent. Discussed and encouraged sources of iron in her diet. Pregnancy and Anemia education included in the AVS.   -     ferrous sulfate 324 MG tablet delayed-release; Take 1 tablet by mouth Daily With Breakfast.  Dispense: 30 tablet; Refill: 3    6. Chlamydia infection during pregnancy  Noted previous positive chlamydia without a KEL upon review. Will send urine today.  -     Chlamydia trachomatis, Neisseria gonorrhoeae, Trichomonas vaginalis, PCR - Urine, Urine, Random Void    7. Antepartum gonorrhea  Noted previous positive gonorrhea with treatment without KEL noted. Will send urine today.   -     Chlamydia trachomatis, Neisseria gonorrhoeae, Trichomonas vaginalis, PCR - Urine, Urine, Random Void        Return to the office in 2 weeks for routine prenatal visit with Dr. Ocampo and as needed with concerns.        This note has been signed electronically.    Swati Gallo, DNP, APRN, CNM, RNC-OB

## 2024-05-17 LAB
C TRACH RRNA SPEC QL NAA+PROBE: NEGATIVE
N GONORRHOEA RRNA SPEC QL NAA+PROBE: NEGATIVE
T VAGINALIS RRNA SPEC QL NAA+PROBE: POSITIVE

## 2024-05-19 ENCOUNTER — HOSPITAL ENCOUNTER (OUTPATIENT)
Facility: HOSPITAL | Age: 24
Discharge: HOME | End: 2024-05-19
Attending: OBSTETRICS & GYNECOLOGY | Admitting: OBSTETRICS & GYNECOLOGY
Payer: COMMERCIAL

## 2024-05-19 ENCOUNTER — DOCUMENTATION (OUTPATIENT)
Age: 24
End: 2024-05-19
Payer: COMMERCIAL

## 2024-05-19 VITALS
HEIGHT: 64 IN | BODY MASS INDEX: 38 KG/M2 | WEIGHT: 222.6 LBS | RESPIRATION RATE: 18 BRPM | SYSTOLIC BLOOD PRESSURE: 137 MMHG | TEMPERATURE: 97.4 F | OXYGEN SATURATION: 100 % | HEART RATE: 70 BPM | DIASTOLIC BLOOD PRESSURE: 79 MMHG

## 2024-05-19 PROCEDURE — 96365 THER/PROPH/DIAG IV INF INIT: CPT

## 2024-05-19 PROCEDURE — G0463 HOSPITAL OUTPT CLINIC VISIT: HCPCS

## 2024-05-19 PROCEDURE — 25810000003 LACTATED RINGERS PER 1000 ML: Performed by: OBSTETRICS & GYNECOLOGY

## 2024-05-19 PROCEDURE — G0378 HOSPITAL OBSERVATION PER HR: HCPCS

## 2024-05-19 PROCEDURE — 25010000002 ONDANSETRON PER 1 MG: Performed by: OBSTETRICS & GYNECOLOGY

## 2024-05-19 RX ORDER — SODIUM CHLORIDE, SODIUM LACTATE, POTASSIUM CHLORIDE, CALCIUM CHLORIDE 600; 310; 30; 20 MG/100ML; MG/100ML; MG/100ML; MG/100ML
125 INJECTION, SOLUTION INTRAVENOUS CONTINUOUS
Status: DISCONTINUED | OUTPATIENT
Start: 2024-05-19 | End: 2024-05-19 | Stop reason: HOSPADM

## 2024-05-19 RX ORDER — SODIUM CHLORIDE 0.9 % (FLUSH) 0.9 %
10 SYRINGE (ML) INJECTION EVERY 12 HOURS SCHEDULED
Status: DISCONTINUED | OUTPATIENT
Start: 2024-05-19 | End: 2024-05-19 | Stop reason: HOSPADM

## 2024-05-19 RX ORDER — ONDANSETRON 4 MG/1
4 TABLET, ORALLY DISINTEGRATING ORAL EVERY 8 HOURS PRN
Qty: 30 TABLET | Refills: 1 | Status: SHIPPED | OUTPATIENT
Start: 2024-05-19

## 2024-05-19 RX ORDER — SODIUM CHLORIDE 0.9 % (FLUSH) 0.9 %
10 SYRINGE (ML) INJECTION AS NEEDED
Status: DISCONTINUED | OUTPATIENT
Start: 2024-05-19 | End: 2024-05-19 | Stop reason: HOSPADM

## 2024-05-19 RX ORDER — OMEPRAZOLE 20 MG/1
20 CAPSULE, DELAYED RELEASE ORAL DAILY
Qty: 30 CAPSULE | Refills: 1 | Status: SHIPPED | OUTPATIENT
Start: 2024-05-19 | End: 2025-05-19

## 2024-05-19 RX ADMIN — SODIUM CHLORIDE, POTASSIUM CHLORIDE, SODIUM LACTATE AND CALCIUM CHLORIDE 125 ML/HR: 600; 310; 30; 20 INJECTION, SOLUTION INTRAVENOUS at 11:48

## 2024-05-19 RX ADMIN — ONDANSETRON 6 MG: 2 INJECTION INTRAMUSCULAR; INTRAVENOUS at 11:48

## 2024-05-19 NOTE — PROGRESS NOTES
[place]  Carmelita Powers  : 2000  MRN: [id number]  CSN: [id number]    Labor & Delivery EULALIA progress note    Subjective   Chief Complaint: She reports nausea    HPI: Patient states she has had heartburn recently, discussed with OB provider as seen in last note but meds not called in. Complaining today of nausea, possibly attributed to the above    History:    Prenatal Information:  Prenatal Results       Initial Prenatal Labs       Test Value Reference Range Date Time    Hemoglobin ^ 10.4 g/dL  23     Hematocrit ^ 31 %  23     Platelets ^ 247 K/µL  23     Rubella IgG ^ 2.28   23     Hepatitis B SAg ^ Negative   23     Hepatitis C Ab ^ negative   23     RPR  Non Reactive  Non Reactive 24 1324      ^ Non-Reactive   23     T. Pallidum Ab         ABO ^ B   23     Rh ^ Positive   23     Antibody Screen ^ Normal  Normal 23     HIV ^ non-reactive   23     Urine Culture  <10,000 CFU/mL Normal Urogenital Alysha   24 0856      ^ See table below  No growth at 24 hours, No growth at 2 days, No growth at 3 days, No growth, Growth present, too young to evaluate, Culture in progress 23     Gonorrhea  Negative  Negative 24 1334      ^ positive   23     Chlamydia  Negative  Negative 24 1334      ^ positive   23     TSH        HgB A1c         Varicella IgG        HgB Electrophoresis         Cystic fibrosis                   Fetal testing        Test Value Reference Range Date Time    NIPT        MSAFP        AFP-4                  2nd and 3rd Trimester       Test Value Reference Range Date Time    Hemoglobin (repeated)  9.9 g/dL 11.1 - 15.9 24 1324       10.3 g/dL 11.1 - 15.9 24 1324       10.9 g/dL 12.0 - 15.9 24 0747    Hematocrit (repeated)  31.3 % 34.0 - 46.6 24 1324       34.0 % 34.0 - 46.6 24 0747    Platelets   203 x10E3/uL 150 - 450 24 1324       227 10*3/mm3 140 - 450  03/28/24 0747      ^ 247 K/µL  12/18/23     GCT  109 mg/dL 70 - 139 04/18/24 1324    Antibody Screen (repeated)        3rd TM syphilis scrn (repeated)  RPR   Non Reactive  Non Reactive 04/18/24 1324    3rd TM syphilis scrn (repeated) FTA        GTT Fasting        GTT 1 Hr        GTT 2 Hr        GTT 3 Hr        Group B Strep                  Other testing        Test Value Reference Range Date Time    Parvo IgG         CMV IgG                   Drug Screening       Test Value Reference Range Date Time    Amphetamine Screen        Barbiturate Screen        Benzodiazepine Screen        Methadone Screen        Phencyclidine Screen        Opiates Screen        THC Screen        Cocaine Screen        Propoxyphene Screen        Buprenorphine Screen        Methamphetamine Screen        Oxycodone Screen        Tricyclic Antidepressants Screen                  Legend    ^: Historical                          External Prenatal Results       Pregnancy Outside Results - Transcribed From Office Records - See Scanned Records For Details       Test Value Date Time    ABO ^ B  12/18/23     Rh ^ Positive  12/18/23     Antibody Screen ^ Normal  12/18/23     Varicella IgG       Rubella ^ 2.28  12/18/23     Hgb  9.9 g/dL 04/18/24 1324       10.3 g/dL 04/18/24 1324       10.9 g/dL 03/28/24 0747      ^ 10.4 g/dL 12/18/23     Hct  31.3 % 04/18/24 1324       34.0 % 03/28/24 0747      ^ 31 % 12/18/23     Glucose Fasting GTT       Glucose Tolerance Test 1 hour       Glucose Tolerance Test 3 hour       Gonorrhea (discrete)  Negative  05/16/24 1334      ^ positive  11/30/23     Chlamydia (discrete)  Negative  05/16/24 1334      ^ positive  11/30/23     RPR  Non Reactive  04/18/24 1324      ^ Non-Reactive  12/18/23     VDRL       Syphilis Antibody       HBsAg ^ Negative  12/18/23     Herpes Simplex Virus PCR       Herpes Simplex VIrus Culture       HIV ^ non-reactive  12/18/23     Hep C RNA Quant PCR       Hep C Antibody ^ negative  12/18/23      AFP       Group B Strep       GBS Susceptibility to Clindamycin       GBS Susceptibility to Erythromycin       Fetal Fibronectin       Genetic Testing, Maternal Blood                 Drug Screening       Test Value Date Time    NIPT        Urine Drug Screen       Amphetamine Screen       Barbiturate Screen       Benzodiazepine Screen       Methadone Screen       Phencyclidine Screen       Opiates Screen       THC Screen       Cocaine Screen       Propoxyphene Screen       Buprenorphine Screen       Methamphetamine Screen       Oxycodone Screen                 Legend    ^: Historical                             Past OB History:     OB History    Para Term  AB Living   2 1 0 1 0 1   SAB IAB Ectopic Molar Multiple Live Births   0 0 0 0 0 0      # Outcome Date GA Lbr Marco A/2nd Weight Sex Type Anes PTL Lv   2 Current            1  22 34w0d  2863 g (6 lb 5 oz) M Vag-Spont         Complications: Pre-eclampsia       Past Medical History: Past Medical History:   Diagnosis Date    Migraine 24    I get migraines really bad all throughout the day and night      Past Surgical History Past Surgical History:   Procedure Laterality Date    TONSILLECTOMY        Family History: Family History   Problem Relation Age of Onset    Breast cancer Neg Hx     Ovarian cancer Neg Hx     Uterine cancer Neg Hx     Colon cancer Neg Hx     Melanoma Neg Hx       Social History:  reports that she has never smoked. She has never used smokeless tobacco.   reports that she does not currently use alcohol.   reports no history of drug use.           High Risk Medical Conditions/Complaints this visit: none    Discussion of Management or Test Interpretation with physcian/provider/healthcare provider: no    External Records Reviewed: Prenatal history in [facility] from [facility] OB provider - ANNALEE [place] ultrasound reviewed, pregnancy complicated by: chronic hypertension, anemia and severe heartburn    Review Previous Test Results:  Prenatal labs in [facility] reviewed, history of: hypokalemia, anemia    Independent Historian: none    Social Determinants to Health: No drug, transportation or housing or other issues noted       Objective   Medical Decision Making:  Amount/Complexity of Data Reviewed  -Labs ordered - none  -Imaging ordered - none  -IV fluids given - yes  Risk:  Prescription drug management - none  Drug therapy requiring intensive monitoring for toxicity - IV zofran  Decision to admit patient - no  Diagnosis/Treatment limited by social determinants - no    Min/max vitals past 24 hours:  Temp  Min: 97.4 °F (36.3 °C)  Max: 97.4 °F (36.3 °C)   BP  Min: 134/78  Max: 137/79   Pulse  Min: 70  Max: 115   Resp  Min: 18  Max: 18        Independent Interpretation NST/FHT's: reassuring and category 1.  external monitors used   Cervix: was not checked.   Contractions:    Review of current test: results none    N/a       Final Diagnoses: Nausea in pregnancy                                Chronic hypertension                                Anemia                                Heartburn     Assessment   IUP at 31w5d  Nausea - resolved     Plan   Patient requests to go home  Secure chat message sent requesting followup/treatment for heartburn/nausea should they return    Regina Reaves MD  5/19/2024  12:33 CDT

## 2024-05-22 DIAGNOSIS — A59.01 TRICHOMONAL VAGINITIS DURING PREGNANCY IN THIRD TRIMESTER: Primary | ICD-10-CM

## 2024-05-22 DIAGNOSIS — O23.593 TRICHOMONAL VAGINITIS DURING PREGNANCY IN THIRD TRIMESTER: Primary | ICD-10-CM

## 2024-05-22 RX ORDER — METRONIDAZOLE 500 MG/1
500 TABLET ORAL 2 TIMES DAILY
Qty: 14 TABLET | Refills: 0 | Status: SHIPPED | OUTPATIENT
Start: 2024-05-22 | End: 2024-05-29

## 2024-05-30 ENCOUNTER — ROUTINE PRENATAL (OUTPATIENT)
Age: 24
End: 2024-05-30
Payer: COMMERCIAL

## 2024-05-30 VITALS — SYSTOLIC BLOOD PRESSURE: 136 MMHG | WEIGHT: 226 LBS | DIASTOLIC BLOOD PRESSURE: 88 MMHG | BODY MASS INDEX: 38.79 KG/M2

## 2024-05-30 DIAGNOSIS — O10.919 HTN IN PREGNANCY, CHRONIC: ICD-10-CM

## 2024-05-30 DIAGNOSIS — Z34.83 MULTIGRAVIDA IN THIRD TRIMESTER: ICD-10-CM

## 2024-05-30 DIAGNOSIS — Z3A.33 33 WEEKS GESTATION OF PREGNANCY: Primary | ICD-10-CM

## 2024-05-30 LAB
GLUCOSE UR STRIP-MCNC: NEGATIVE MG/DL
PROT UR STRIP-MCNC: ABNORMAL MG/DL

## 2024-05-30 NOTE — PROGRESS NOTES
Denies PIH symptoms, however was seen on OB yesterday for heartburn. BP normal then. Hx preeclampsia in prior pregnancy.  Gfm. Denies VB, LOF, ctx.     /88   Wt 103 kg (226 lb)   LMP 10/10/2023 (Exact Date)   BMI 38.79 kg/m²    FHTs 136  Urine protein 1+, urine glucose negative    Diagnoses and all orders for this visit:    1. 33 weeks gestation of pregnancy (Primary)  -     POC Urinalysis Dipstick    2. HTN in pregnancy, chronic  -     Protein / Creatinine Ratio, Urine - Urine, Clean Catch  -     CBC (No Diff)  -     Comprehensive Metabolic Panel  BP stable however 1+ UP today.  PIH labs and PCR today. PIH precautions given. RTC 2 weeks  3. Multigravida in third trimester

## 2024-05-31 LAB
ALBUMIN SERPL-MCNC: 3.5 G/DL (ref 4–5)
ALBUMIN/GLOB SERPL: 1.5 {RATIO} (ref 1.2–2.2)
ALP SERPL-CCNC: 90 IU/L (ref 44–121)
ALT SERPL-CCNC: 7 IU/L (ref 0–32)
AST SERPL-CCNC: 12 IU/L (ref 0–40)
BILIRUB SERPL-MCNC: 0.5 MG/DL (ref 0–1.2)
BUN SERPL-MCNC: 6 MG/DL (ref 6–20)
BUN/CREAT SERPL: 9 (ref 9–23)
CALCIUM SERPL-MCNC: 9.1 MG/DL (ref 8.7–10.2)
CHLORIDE SERPL-SCNC: 102 MMOL/L (ref 96–106)
CO2 SERPL-SCNC: 20 MMOL/L (ref 20–29)
CREAT SERPL-MCNC: 0.69 MG/DL (ref 0.57–1)
CREAT UR-MCNC: 323.4 MG/DL
EGFRCR SERPLBLD CKD-EPI 2021: 126 ML/MIN/1.73
ERYTHROCYTE [DISTWIDTH] IN BLOOD BY AUTOMATED COUNT: 13.1 % (ref 11.7–15.4)
GLOBULIN SER CALC-MCNC: 2.4 G/DL (ref 1.5–4.5)
GLUCOSE SERPL-MCNC: 67 MG/DL (ref 70–99)
HCT VFR BLD AUTO: 31.9 % (ref 34–46.6)
HGB BLD-MCNC: 10 G/DL (ref 11.1–15.9)
MCH RBC QN AUTO: 24.1 PG (ref 26.6–33)
MCHC RBC AUTO-ENTMCNC: 31.3 G/DL (ref 31.5–35.7)
MCV RBC AUTO: 77 FL (ref 79–97)
PLATELET # BLD AUTO: 254 X10E3/UL (ref 150–450)
POTASSIUM SERPL-SCNC: 4.2 MMOL/L (ref 3.5–5.2)
PROT SERPL-MCNC: 5.9 G/DL (ref 6–8.5)
PROT UR-MCNC: 39.9 MG/DL
PROT/CREAT UR: 123 MG/G CREAT (ref 0–200)
RBC # BLD AUTO: 4.15 X10E6/UL (ref 3.77–5.28)
SODIUM SERPL-SCNC: 137 MMOL/L (ref 134–144)
WBC # BLD AUTO: 8.9 X10E3/UL (ref 3.4–10.8)

## 2024-06-02 ENCOUNTER — HOSPITAL ENCOUNTER (EMERGENCY)
Facility: HOSPITAL | Age: 24
Discharge: HOME | End: 2024-06-02
Admitting: FAMILY MEDICINE
Payer: COMMERCIAL

## 2024-06-02 ENCOUNTER — APPOINTMENT (OUTPATIENT)
Dept: GENERAL RADIOLOGY | Facility: HOSPITAL | Age: 24
End: 2024-06-02
Payer: COMMERCIAL

## 2024-06-02 VITALS
WEIGHT: 231 LBS | RESPIRATION RATE: 14 BRPM | OXYGEN SATURATION: 96 % | HEIGHT: 64 IN | DIASTOLIC BLOOD PRESSURE: 82 MMHG | SYSTOLIC BLOOD PRESSURE: 145 MMHG | TEMPERATURE: 99.4 F | HEART RATE: 105 BPM | BODY MASS INDEX: 39.44 KG/M2

## 2024-06-02 DIAGNOSIS — R05.1 ACUTE COUGH: Primary | ICD-10-CM

## 2024-06-02 DIAGNOSIS — U07.1 COVID: ICD-10-CM

## 2024-06-02 DIAGNOSIS — J02.9 SORE THROAT: ICD-10-CM

## 2024-06-02 LAB
B PARAPERT DNA SPEC QL NAA+PROBE: NOT DETECTED
B PERT DNA SPEC QL NAA+PROBE: NOT DETECTED
C PNEUM DNA NPH QL NAA+NON-PROBE: NOT DETECTED
FLUAV SUBTYP SPEC NAA+PROBE: NOT DETECTED
FLUBV RNA ISLT QL NAA+PROBE: NOT DETECTED
HADV DNA SPEC NAA+PROBE: NOT DETECTED
HCOV 229E RNA SPEC QL NAA+PROBE: NOT DETECTED
HCOV HKU1 RNA SPEC QL NAA+PROBE: NOT DETECTED
HCOV NL63 RNA SPEC QL NAA+PROBE: NOT DETECTED
HCOV OC43 RNA SPEC QL NAA+PROBE: NOT DETECTED
HMPV RNA NPH QL NAA+NON-PROBE: NOT DETECTED
HPIV1 RNA ISLT QL NAA+PROBE: NOT DETECTED
HPIV2 RNA SPEC QL NAA+PROBE: NOT DETECTED
HPIV3 RNA NPH QL NAA+PROBE: NOT DETECTED
HPIV4 P GENE NPH QL NAA+PROBE: NOT DETECTED
M PNEUMO IGG SER IA-ACNC: NOT DETECTED
RHINOVIRUS RNA SPEC NAA+PROBE: NOT DETECTED
RSV RNA NPH QL NAA+NON-PROBE: NOT DETECTED
S PYO AG THROAT QL: NEGATIVE
SARS-COV-2 RNA NPH QL NAA+NON-PROBE: DETECTED

## 2024-06-02 PROCEDURE — 71045 X-RAY EXAM CHEST 1 VIEW: CPT

## 2024-06-02 PROCEDURE — 87880 STREP A ASSAY W/OPTIC: CPT | Performed by: FAMILY MEDICINE

## 2024-06-02 PROCEDURE — 0202U NFCT DS 22 TRGT SARS-COV-2: CPT | Performed by: FAMILY MEDICINE

## 2024-06-02 PROCEDURE — 87081 CULTURE SCREEN ONLY: CPT | Performed by: FAMILY MEDICINE

## 2024-06-02 PROCEDURE — 99283 EMERGENCY DEPT VISIT LOW MDM: CPT

## 2024-06-02 NOTE — DISCHARGE INSTRUCTIONS
Please follow-up with primary care provider soon as possible to reassess symptoms.  Please return to the ER for any new or worsening symptoms.  You did test positive for COVID.  Please treat this symptomatically and stay well-hydrated.  Okay for Tylenol as needed for fever.

## 2024-06-02 NOTE — Clinical Note
[facility] EMERGENCY DEPARTMENT  [address] [place], [place] [address] [address]VE  [facility] [address]  Phone: [phone]    Carmelita Powers was seen and treated in our emergency department on 6/2/2024.  She may return to work on 06/03/2024.         Thank you for choosing [facility].    Sarah Chadwick APRN

## 2024-06-02 NOTE — ED PROVIDER NOTES
Subjective   History of Present Illness  Patient is a 24-year-old female who presents emergency department complaints of a cough and sore throat.  Patient reports has been ongoing for the past 2 days.  Denies any fevers.  States that her side has also had a cough for the past couple of days.  No other sick contacts.        Review of Systems   HENT:  Positive for sore throat.    Respiratory:  Positive for cough.    All other systems reviewed and are negative.      Past Medical History:   Diagnosis Date    Migraine 1/19/24    I get migraines really bad all throughout the day and night       No Known Allergies    Past Surgical History:   Procedure Laterality Date    TONSILLECTOMY         Family History   Problem Relation Age of Onset    Breast cancer Neg Hx     Ovarian cancer Neg Hx     Uterine cancer Neg Hx     Colon cancer Neg Hx     Melanoma Neg Hx        Social History     Socioeconomic History    Marital status: Single   Tobacco Use    Smoking status: Never    Smokeless tobacco: Never   Vaping Use    Vaping status: Never Used   Substance and Sexual Activity    Alcohol use: Not Currently    Drug use: Never    Sexual activity: Not Currently     Partners: Male     Birth control/protection: None           Objective   Physical Exam  Vitals and nursing note reviewed.   Constitutional:       General: She is not in acute distress.     Appearance: Normal appearance. She is normal weight. She is not ill-appearing or toxic-appearing.   HENT:      Head: Normocephalic.      Right Ear: Tympanic membrane, ear canal and external ear normal.      Left Ear: Tympanic membrane, ear canal and external ear normal.      Nose: Nose normal.      Mouth/Throat:      Mouth: Mucous membranes are moist.      Pharynx: No oropharyngeal exudate or posterior oropharyngeal erythema.   Cardiovascular:      Rate and Rhythm: Normal rate and regular rhythm.      Pulses: Normal pulses.      Heart sounds: Normal heart sounds.   Pulmonary:      Effort:  Pulmonary effort is normal.      Breath sounds: Normal breath sounds.   Abdominal:      General: Abdomen is flat. Bowel sounds are normal. There is no distension.      Palpations: Abdomen is soft.      Tenderness: There is no abdominal tenderness.   Musculoskeletal:         General: Normal range of motion.      Cervical back: Normal range of motion and neck supple.   Skin:     General: Skin is warm and dry.   Neurological:      General: No focal deficit present.      Mental Status: She is alert and oriented to person, place, and time. Mental status is at baseline.   Psychiatric:         Mood and Affect: Mood normal.         Behavior: Behavior normal.         Thought Content: Thought content normal.         Judgment: Judgment normal.         Procedures           ED Course                                             Medical Decision Making  Carmelita Powers is a very pleasant 24 y.o. female who presents to the emergency department for cough and sore throat.     Patient was non-toxic and not-ill appearing on arrival. Vital signs stable.     Patient's presentation raises suspicion for differentials including, but not limited to, viral illness, pneumonia, strep throat.     External (non-ED) record review: None    Given this, laboratory studies and imaging studies were ordered including respiratory panel, strep screen, chest x-ray.     Imaging was reviewed by radiologist.  Chest x-ray revealed no acute findings.    Respiratory panel positive for COVID-19.  Negative strep.  On re-evaluation, patient remained hemodynamically stable and appeared to be comfortable and in no acute distress.  Lungs were clear bilaterally on exam.  Feel that patient's presentation is most likely related to COVID-19.  She is advised to stay well-hydrated at home and treat symptomatically with Tylenol as needed for fever.    I discussed all of the lab and imaging results with the patient during this visit in the emergency department. I answered  all the questions regarding the emergency department evaluation, diagnosis, and treatment plan. We talked about how crucial it is for the patient to follow up by calling their primary care provider as soon as possible to schedule an appointment for within the next few days or as soon as possible so that the symptoms can be reassessed to see if they have improved or to answer any additional questions. I also provided the patient with advice on returning safely and urged the patient to visit the emergency department right away if any worsening or new symptoms appeared. The patient verbalized understanding of the discharge instructions and agreed with them. Carmelita was discharged in stable condition.    Signed by:   KIERRA Jacobson 6/2/2024 16:52 CDT     Dragon disclaimer:  Part of this note may be an electronic transcription/translation of spoken language to printed text using the Dragon Dictation System.    Problems Addressed:  Acute cough: acute illness or injury  COVID: acute illness or injury  Sore throat: acute illness or injury    Amount and/or Complexity of Data Reviewed  Radiology: ordered.        Final diagnoses:   Acute cough   Sore throat   COVID       ED Disposition  ED Disposition       ED Disposition   Discharge    Condition   Stable    Comment   --               Provider, No Known  [facility] [id number]  [phone]    Schedule an appointment as soon as possible for a visit in 1 day      [facility] EMERGENCY DEPARTMENT  [address] [place] [id number] [phone]  Go to   If symptoms worsen         Medication List      No changes were made to your prescriptions during this visit.            Sarah Chadwick, KIERRA  06/02/24 [id number]

## 2024-06-04 LAB — BACTERIA SPEC AEROBE CULT: NORMAL

## 2024-06-05 RX ORDER — OMEPRAZOLE 20 MG/1
20 CAPSULE, DELAYED RELEASE ORAL DAILY
Qty: 90 CAPSULE | Refills: 1 | Status: SHIPPED | OUTPATIENT
Start: 2024-06-05 | End: 2025-06-05

## 2024-06-12 ENCOUNTER — HOSPITAL ENCOUNTER (OUTPATIENT)
Facility: HOSPITAL | Age: 24
Discharge: HOME | End: 2024-06-12
Attending: OBSTETRICS & GYNECOLOGY | Admitting: OBSTETRICS & GYNECOLOGY
Payer: COMMERCIAL

## 2024-06-12 VITALS
HEIGHT: 64 IN | BODY MASS INDEX: 39.78 KG/M2 | HEART RATE: 76 BPM | SYSTOLIC BLOOD PRESSURE: 139 MMHG | RESPIRATION RATE: 16 BRPM | WEIGHT: 233 LBS | TEMPERATURE: 98.4 F | DIASTOLIC BLOOD PRESSURE: 82 MMHG

## 2024-06-12 PROBLEM — O26.853 SPOTTING COMPLICATING PREGNANCY IN THIRD TRIMESTER: Status: ACTIVE | Noted: 2024-06-12

## 2024-06-12 LAB
BACTERIA UR QL AUTO: ABNORMAL /HPF
BILIRUB UR QL STRIP: ABNORMAL
CLARITY UR: CLEAR
CLUE CELLS SPEC QL WET PREP: ABNORMAL
COLOR UR: ABNORMAL
GLUCOSE UR STRIP-MCNC: NEGATIVE MG/DL
HGB UR QL STRIP.AUTO: NEGATIVE
HYALINE CASTS UR QL AUTO: ABNORMAL /LPF
HYDATID CYST SPEC WET PREP: ABNORMAL
KETONES UR QL STRIP: ABNORMAL
LEUKOCYTE ESTERASE UR QL STRIP.AUTO: ABNORMAL
NITRITE UR QL STRIP: NEGATIVE
PH UR STRIP.AUTO: 6 [PH] (ref 5–8)
PROT UR QL STRIP: ABNORMAL
RBC # UR STRIP: ABNORMAL /HPF
REF LAB TEST METHOD: ABNORMAL
SP GR UR STRIP: 1.02 (ref 1–1.03)
SQUAMOUS #/AREA URNS HPF: ABNORMAL /HPF
T VAGINALIS SPEC QL WET PREP: ABNORMAL
UROBILINOGEN UR QL STRIP: ABNORMAL
WBC # UR STRIP: ABNORMAL /HPF
WBC SPEC QL WET PREP: ABNORMAL
YEAST GENITAL QL WET PREP: ABNORMAL

## 2024-06-12 PROCEDURE — 96365 THER/PROPH/DIAG IV INF INIT: CPT

## 2024-06-12 PROCEDURE — 25810000003 LACTATED RINGERS SOLUTION: Performed by: OBSTETRICS & GYNECOLOGY

## 2024-06-12 PROCEDURE — 81001 URINALYSIS AUTO W/SCOPE: CPT | Performed by: OBSTETRICS & GYNECOLOGY

## 2024-06-12 PROCEDURE — G0378 HOSPITAL OBSERVATION PER HR: HCPCS

## 2024-06-12 PROCEDURE — 87086 URINE CULTURE/COLONY COUNT: CPT | Performed by: OBSTETRICS & GYNECOLOGY

## 2024-06-12 PROCEDURE — G0463 HOSPITAL OUTPT CLINIC VISIT: HCPCS

## 2024-06-12 PROCEDURE — 87210 SMEAR WET MOUNT SALINE/INK: CPT | Performed by: OBSTETRICS & GYNECOLOGY

## 2024-06-12 PROCEDURE — 25010000002 CEFTRIAXONE PER 250 MG: Performed by: OBSTETRICS & GYNECOLOGY

## 2024-06-12 RX ADMIN — SODIUM CHLORIDE, POTASSIUM CHLORIDE, SODIUM LACTATE AND CALCIUM CHLORIDE 2000 ML: 600; 310; 30; 20 INJECTION, SOLUTION INTRAVENOUS at 06:34

## 2024-06-12 RX ADMIN — SODIUM CHLORIDE, POTASSIUM CHLORIDE, SODIUM LACTATE AND CALCIUM CHLORIDE 2000 ML: 600; 310; 30; 20 INJECTION, SOLUTION INTRAVENOUS at 05:19

## 2024-06-12 RX ADMIN — CEFTRIAXONE SODIUM 2000 MG: 2 INJECTION, POWDER, FOR SOLUTION INTRAMUSCULAR; INTRAVENOUS at 05:54

## 2024-06-12 NOTE — PROGRESS NOTES
[facility]  Carmelita Powers  : 2000  MRN: [id number]  CSN: [id number]    Labor & Delivery EULALIA progress note    Subjective   Chief Complaint: She reports light spotting when she wiped at work this morning    HPI: Patient states no intercourse for months. Is sure spotting was vaginal as after she urinated she first wiped from the front and saw scant blood, then wiped from behind and there was no spotting    History:    Prenatal Information:  Prenatal Results       Initial Prenatal Labs       Test Value Reference Range Date Time    Hemoglobin ^ 10.4 g/dL  23     Hematocrit ^ 31 %  23     Platelets ^ 247 K/µL  23     Rubella IgG ^ 2.28   23     Hepatitis B SAg ^ Negative   23     Hepatitis C Ab ^ negative   23     RPR  Non Reactive  Non Reactive 24 1324      ^ Non-Reactive   23     T. Pallidum Ab         ABO ^ B   23     Rh ^ Positive   23     Antibody Screen ^ Normal  Normal 23     HIV ^ non-reactive   23     Urine Culture  <10,000 CFU/mL Normal Urogenital Alysha   24 0856      ^ See table below  No growth at 24 hours, No growth at 2 days, No growth at 3 days, No growth, Growth present, too young to evaluate, Culture in progress 23     Gonorrhea  Negative  Negative 24 1334      ^ positive   23     Chlamydia  Negative  Negative 24 1334      ^ positive   23     TSH        HgB A1c         Varicella IgG        HgB Electrophoresis         Hemoglobinopathy (genetic testing)        Cystic fibrosis                   Fetal testing        Test Value Reference Range Date Time    NIPT        MSAFP        AFP-4                  2nd and 3rd Trimester       Test Value Reference Range Date Time    Hemoglobin (repeated)  10.0 g/dL 11.1 - 15.9 24 1240       9.9 g/dL 11.1 - 15.9 24 1324       10.3 g/dL 11.1 - 15.9 24 1324       10.9 g/dL 12.0 - 15.9 24 0747    Hematocrit (repeated)  31.9 % 34.0 -  46.6 05/30/24 1240       31.3 % 34.0 - 46.6 04/18/24 1324       34.0 % 34.0 - 46.6 03/28/24 0747    Platelets   254 x10E3/uL 150 - 450 05/30/24 1240       203 x10E3/uL 150 - 450 04/18/24 1324       227 10*3/mm3 140 - 450 03/28/24 0747      ^ 247 K/µL  12/18/23     1 hour GTT   109 mg/dL 70 - 139 04/18/24 1324    Antibody Screen (repeated)        3rd TM syphilis scrn (repeated)  RPR   Non Reactive  Non Reactive 04/18/24 1324    3rd TM syphilis scrn (repeated) FTA        GTT Fasting        GTT 1 Hr        GTT 2 Hr        GTT 3 Hr        Group B Strep                  Other testing        Test Value Reference Range Date Time    Parvo IgG         CMV IgG                   Drug Screening       Test Value Reference Range Date Time    Amphetamine Screen        Barbiturate Screen        Benzodiazepine Screen        Methadone Screen        Phencyclidine Screen        Opiates Screen        THC Screen        Cocaine Screen        Propoxyphene Screen        Buprenorphine Screen        Methamphetamine Screen        Oxycodone Screen        Tricyclic Antidepressants Screen                  Legend    ^: Historical                          External Prenatal Results       Pregnancy Outside Results - Transcribed From Office Records - See Scanned Records For Details       Test Value Date Time    ABO ^ B  12/18/23     Rh ^ Positive  12/18/23     Antibody Screen ^ Normal  12/18/23     Varicella IgG       Rubella ^ 2.28  12/18/23     Hgb  10.0 g/dL 05/30/24 1240       9.9 g/dL 04/18/24 1324       10.3 g/dL 04/18/24 1324       10.9 g/dL 03/28/24 0747      ^ 10.4 g/dL 12/18/23     Hct  31.9 % 05/30/24 1240       31.3 % 04/18/24 1324       34.0 % 03/28/24 0747      ^ 31 % 12/18/23     HgB A1c        1h GTT  109 mg/dL 04/18/24 1324    3h GTT Fasting       3h GTT 1 hour       3h GTT 2 hour       3h GTT 3 hour        Gonorrhea (discrete)  Negative  05/16/24 1334      ^ positive  11/30/23     Chlamydia (discrete)  Negative  05/16/24 1334      ^  positive  23     RPR  Non Reactive  24 1324      ^ Non-Reactive  23     Syphilis Antibody       HBsAg ^ Negative  23     Herpes Simplex Virus PCR       Herpes Simplex VIrus Culture       HIV ^ non-reactive  23     Hep C RNA Quant PCR       Hep C Antibody ^ negative  23     AFP       NIPT       Cystic Fibroisis        Group B Strep       GBS Susceptibility to Clindamycin       GBS Susceptibility to Erythromycin       Fetal Fibronectin       Genetic Testing, Maternal Blood                 Drug Screening       Test Value Date Time    Urine Drug Screen       Amphetamine Screen       Barbiturate Screen       Benzodiazepine Screen       Methadone Screen       Phencyclidine Screen       Opiates Screen       THC Screen       Cocaine Screen       Propoxyphene Screen       Buprenorphine Screen       Methamphetamine Screen       Oxycodone Screen       Tricyclic Antidepressants Screen                 Legend    ^: Historical                             Past OB History:     OB History    Para Term  AB Living   2 1 0 1 0 1   SAB IAB Ectopic Molar Multiple Live Births   0 0 0 0 0 0      # Outcome Date GA Lbr Marco A/2nd Weight Sex Type Anes PTL Lv   2 Current            1  22 34w0d  2863 g (6 lb 5 oz) M Vag-Spont         Complications: Pre-eclampsia       Past Medical History: Past Medical History:   Diagnosis Date    Migraine 24    I get migraines really bad all throughout the day and night      Past Surgical History Past Surgical History:   Procedure Laterality Date    TONSILLECTOMY        Family History: Family History   Problem Relation Age of Onset    Breast cancer Neg Hx     Ovarian cancer Neg Hx     Uterine cancer Neg Hx     Colon cancer Neg Hx     Melanoma Neg Hx       Social History:  reports that she has never smoked. She has never used smokeless tobacco.   reports that she does not currently use alcohol.   reports no history of drug use.           High Risk  Medical Conditions/Complaints this visit: spotting in 3rd trimester    Discussion of Management or Test Interpretation with physcian/provider/healthcare provider: No    External Records Reviewed: Prenatal [place] in [facility] [place] [facility] [place] ANNALEE Ocampo ultrasound reviewed, pregnancy complicated by: chronic hypertension, morbid obesity    Review Previous Test Results: Prenatal labs in [facility] reviewed, history of: chlamydia/gonorrhea in this pregnancy    Independent Historian: none    Social Determinants to Health: No drug, transportation or housing or other issues noted       Objective   Medical Decision Making:  Amount/Complexity of Data Reviewed  -Labs ordered - wet prep, urinalysis  -Imaging ordered - none  -IV fluids given - yes  Risk:  Prescription drug management - n/a  Drug therapy requiring intensive monitoring for toxicity - rocephin  Decision to admit patient - no  Diagnosis/Treatment limited by social determinants - no    Min/max vitals past 24 hours:  Temp  Min: 98.4 °F (36.9 °C)  Max: 98.4 °F (36.9 °C)   BP  Min: 135/90  Max: 135/90   Pulse  Min: 75  Max: 94   Resp  Min: 16  Max: 16        Independent Interpretation NST/FHT's: reassuring and category 1.  external monitors used   Cervix: was checked (by me): 2 cm / 70 % / -3   Contractions:    Review of current test: results irregular    UTI       Final Diagnoses: UTI       Assessment   IUP at 35w1d  Spotting in 3rd trimester  UTI     Plan   Antibiotics  Keep OB appt on Friday    Regina Reaves MD  6/12/2024  05:55 CDT

## 2024-06-12 NOTE — PLAN OF CARE
Goal Outcome Evaluation:  Plan of Care Reviewed With: patient        Progress: improving  Outcome Evaluation: pt presents with vaginal bleeding; wet prep, UA, 2L bolus, rocephin given; pain improved per pt report

## 2024-06-13 LAB — BACTERIA SPEC AEROBE CULT: NORMAL

## 2024-06-14 ENCOUNTER — ROUTINE PRENATAL (OUTPATIENT)
Age: 24
End: 2024-06-14
Payer: COMMERCIAL

## 2024-06-14 ENCOUNTER — HOSPITAL ENCOUNTER (INPATIENT)
Facility: HOSPITAL | Age: 24
LOS: 1 days | Discharge: HOME | End: 2024-06-15
Attending: OBSTETRICS & GYNECOLOGY | Admitting: OBSTETRICS & GYNECOLOGY
Payer: COMMERCIAL

## 2024-06-14 VITALS — DIASTOLIC BLOOD PRESSURE: 98 MMHG | SYSTOLIC BLOOD PRESSURE: 154 MMHG | BODY MASS INDEX: 38.45 KG/M2 | WEIGHT: 224 LBS

## 2024-06-14 DIAGNOSIS — A59.01 TRICHOMONAL VAGINITIS DURING PREGNANCY IN THIRD TRIMESTER: ICD-10-CM

## 2024-06-14 DIAGNOSIS — Z3A.35 35 WEEKS GESTATION OF PREGNANCY: Primary | ICD-10-CM

## 2024-06-14 DIAGNOSIS — O99.019 MATERNAL ANEMIA IN PREGNANCY, ANTEPARTUM: ICD-10-CM

## 2024-06-14 DIAGNOSIS — O10.913 CHRONIC HYPERTENSION IN OBSTETRIC CONTEXT IN THIRD TRIMESTER: ICD-10-CM

## 2024-06-14 DIAGNOSIS — O23.593 TRICHOMONAL VAGINITIS DURING PREGNANCY IN THIRD TRIMESTER: ICD-10-CM

## 2024-06-14 DIAGNOSIS — Z34.83 MULTIGRAVIDA IN THIRD TRIMESTER: ICD-10-CM

## 2024-06-14 LAB
ALBUMIN SERPL-MCNC: 3.4 G/DL (ref 3.5–5.2)
ALBUMIN/GLOB SERPL: 1.2 G/DL
ALP SERPL-CCNC: 112 U/L (ref 39–117)
ALT SERPL W P-5'-P-CCNC: 5 U/L (ref 1–33)
ANION GAP SERPL CALCULATED.3IONS-SCNC: 8 MMOL/L (ref 5–15)
AST SERPL-CCNC: 11 U/L (ref 1–32)
BASOPHILS # BLD AUTO: 0.02 10*3/MM3 (ref 0–0.2)
BASOPHILS NFR BLD AUTO: 0.2 % (ref 0–1.5)
BILIRUB SERPL-MCNC: 0.6 MG/DL (ref 0–1.2)
BUN SERPL-MCNC: 5 MG/DL (ref 6–20)
BUN/CREAT SERPL: 7.4 (ref 7–25)
CALCIUM SPEC-SCNC: 9.1 MG/DL (ref 8.6–10.5)
CHLORIDE SERPL-SCNC: 105 MMOL/L (ref 98–107)
CO2 SERPL-SCNC: 23 MMOL/L (ref 22–29)
CREAT SERPL-MCNC: 0.68 MG/DL (ref 0.57–1)
DEPRECATED RDW RBC AUTO: 35.8 FL (ref 37–54)
EGFRCR SERPLBLD CKD-EPI 2021: 126.5 ML/MIN/1.73
EOSINOPHIL # BLD AUTO: 0.08 10*3/MM3 (ref 0–0.4)
EOSINOPHIL NFR BLD AUTO: 1 % (ref 0.3–6.2)
ERYTHROCYTE [DISTWIDTH] IN BLOOD BY AUTOMATED COUNT: 13.3 % (ref 12.3–15.4)
GLOBULIN UR ELPH-MCNC: 2.9 GM/DL
GLUCOSE SERPL-MCNC: 98 MG/DL (ref 65–99)
GLUCOSE UR STRIP-MCNC: NEGATIVE MG/DL
HCT VFR BLD AUTO: 31.3 % (ref 34–46.6)
HGB BLD-MCNC: 9.8 G/DL (ref 12–15.9)
IMM GRANULOCYTES # BLD AUTO: 0.02 10*3/MM3 (ref 0–0.05)
IMM GRANULOCYTES NFR BLD AUTO: 0.2 % (ref 0–0.5)
LDH SERPL-CCNC: 151 U/L (ref 135–214)
LYMPHOCYTES # BLD AUTO: 2.03 10*3/MM3 (ref 0.7–3.1)
LYMPHOCYTES NFR BLD AUTO: 24.5 % (ref 19.6–45.3)
MCH RBC QN AUTO: 23.8 PG (ref 26.6–33)
MCHC RBC AUTO-ENTMCNC: 31.3 G/DL (ref 31.5–35.7)
MCV RBC AUTO: 76 FL (ref 79–97)
MONOCYTES # BLD AUTO: 0.53 10*3/MM3 (ref 0.1–0.9)
MONOCYTES NFR BLD AUTO: 6.4 % (ref 5–12)
NEUTROPHILS NFR BLD AUTO: 5.59 10*3/MM3 (ref 1.7–7)
NEUTROPHILS NFR BLD AUTO: 67.7 % (ref 42.7–76)
NRBC BLD AUTO-RTO: 0 /100 WBC (ref 0–0.2)
PLATELET # BLD AUTO: 207 10*3/MM3 (ref 140–450)
PMV BLD AUTO: 12.2 FL (ref 6–12)
POTASSIUM SERPL-SCNC: 3.3 MMOL/L (ref 3.5–5.2)
PROT SERPL-MCNC: 6.3 G/DL (ref 6–8.5)
PROT UR STRIP-MCNC: ABNORMAL MG/DL
RBC # BLD AUTO: 4.12 10*6/MM3 (ref 3.77–5.28)
SODIUM SERPL-SCNC: 136 MMOL/L (ref 136–145)
URATE SERPL-MCNC: 6.2 MG/DL (ref 2.4–5.7)
WBC NRBC COR # BLD AUTO: 8.27 10*3/MM3 (ref 3.4–10.8)

## 2024-06-14 PROCEDURE — 85025 COMPLETE CBC W/AUTO DIFF WBC: CPT | Performed by: ADVANCED PRACTICE MIDWIFE

## 2024-06-14 PROCEDURE — 99222 1ST HOSP IP/OBS MODERATE 55: CPT | Performed by: OBSTETRICS & GYNECOLOGY

## 2024-06-14 PROCEDURE — 84156 ASSAY OF PROTEIN URINE: CPT | Performed by: ADVANCED PRACTICE MIDWIFE

## 2024-06-14 PROCEDURE — 36415 COLL VENOUS BLD VENIPUNCTURE: CPT | Performed by: ADVANCED PRACTICE MIDWIFE

## 2024-06-14 PROCEDURE — 80053 COMPREHEN METABOLIC PANEL: CPT | Performed by: ADVANCED PRACTICE MIDWIFE

## 2024-06-14 PROCEDURE — 83615 LACTATE (LD) (LDH) ENZYME: CPT | Performed by: ADVANCED PRACTICE MIDWIFE

## 2024-06-14 PROCEDURE — 82570 ASSAY OF URINE CREATININE: CPT | Performed by: ADVANCED PRACTICE MIDWIFE

## 2024-06-14 PROCEDURE — 84550 ASSAY OF BLOOD/URIC ACID: CPT | Performed by: ADVANCED PRACTICE MIDWIFE

## 2024-06-14 RX ORDER — ONDANSETRON 4 MG/1
8 TABLET, ORALLY DISINTEGRATING ORAL EVERY 8 HOURS PRN
Status: DISCONTINUED | OUTPATIENT
Start: 2024-06-14 | End: 2024-06-15 | Stop reason: HOSPADM

## 2024-06-14 RX ORDER — FERRIC MALTOL 30 MG/1
1 CAPSULE ORAL 2 TIMES DAILY
Qty: 10 CAPSULE | Refills: 0 | COMMUNITY
Start: 2024-06-14

## 2024-06-14 RX ORDER — LABETALOL 200 MG/1
200 TABLET, FILM COATED ORAL EVERY 12 HOURS SCHEDULED
Status: DISCONTINUED | OUTPATIENT
Start: 2024-06-14 | End: 2024-06-15 | Stop reason: HOSPADM

## 2024-06-14 RX ORDER — ACETAMINOPHEN 325 MG/1
650 TABLET ORAL EVERY 4 HOURS PRN
Status: DISCONTINUED | OUTPATIENT
Start: 2024-06-14 | End: 2024-06-15 | Stop reason: HOSPADM

## 2024-06-14 RX ORDER — FERRIC MALTOL 30 MG/1
1 CAPSULE ORAL 2 TIMES DAILY
Qty: 60 CAPSULE | Refills: 3 | Status: SHIPPED | OUTPATIENT
Start: 2024-06-14

## 2024-06-14 RX ORDER — SODIUM CHLORIDE 0.9 % (FLUSH) 0.9 %
10 SYRINGE (ML) INJECTION EVERY 12 HOURS SCHEDULED
Status: DISCONTINUED | OUTPATIENT
Start: 2024-06-14 | End: 2024-06-15 | Stop reason: HOSPADM

## 2024-06-14 RX ORDER — SODIUM CHLORIDE 0.9 % (FLUSH) 0.9 %
10 SYRINGE (ML) INJECTION AS NEEDED
Status: DISCONTINUED | OUTPATIENT
Start: 2024-06-14 | End: 2024-06-15 | Stop reason: HOSPADM

## 2024-06-14 RX ORDER — SODIUM CHLORIDE 9 MG/ML
40 INJECTION, SOLUTION INTRAVENOUS AS NEEDED
Status: DISCONTINUED | OUTPATIENT
Start: 2024-06-14 | End: 2024-06-15 | Stop reason: HOSPADM

## 2024-06-14 RX ORDER — BISACODYL 10 MG
10 SUPPOSITORY, RECTAL RECTAL DAILY PRN
Status: DISCONTINUED | OUTPATIENT
Start: 2024-06-14 | End: 2024-06-15 | Stop reason: HOSPADM

## 2024-06-14 RX ORDER — DOCUSATE SODIUM 100 MG/1
100 CAPSULE, LIQUID FILLED ORAL 2 TIMES DAILY PRN
Status: DISCONTINUED | OUTPATIENT
Start: 2024-06-14 | End: 2024-06-15 | Stop reason: HOSPADM

## 2024-06-14 RX ORDER — ONDANSETRON 2 MG/ML
4 INJECTION INTRAMUSCULAR; INTRAVENOUS EVERY 8 HOURS PRN
Status: DISCONTINUED | OUTPATIENT
Start: 2024-06-14 | End: 2024-06-15 | Stop reason: HOSPADM

## 2024-06-14 RX ORDER — CALCIUM CARBONATE 500 MG/1
1 TABLET, CHEWABLE ORAL 3 TIMES DAILY PRN
Status: DISCONTINUED | OUTPATIENT
Start: 2024-06-14 | End: 2024-06-15 | Stop reason: HOSPADM

## 2024-06-14 RX ADMIN — Medication 10 ML: at 21:08

## 2024-06-14 RX ADMIN — LABETALOL HYDROCHLORIDE 200 MG: 200 TABLET, FILM COATED ORAL at 14:20

## 2024-06-14 NOTE — H&P
[facility]  HISTORY AND PHYSICAL, OBGYN    Patient Name: Carmelita Powers  : 2000  MRN: [id number]  Primary Care Physician:  Provider, No Known  Date of admission: 2024    Subjective   Subjective     Chief Complaint:   Chief Complaint   Patient presents with    Hypertension-Pregnant     Sent from office       HPI: Carmelita Powers is a 24 y.o. year old  with an 2024, by Last Menstrual Period currently at 35w3d presenting with  elevated blood pressures . She reports doing ok at the moment. Has had intermittent episodes of blurry vision. None currently. Denies preeclampsia symptoms at this time.     Prenatal care has been with Ally Ocampo MD  It has been noteworthy for:  Preexisting hypertension diagnosed during pregnancy, not on antihypertensives  Maternal anemia    ROS:  All systems were reviewed and negative except for:   -endorses appropriate fetal movement  -denies regular contractions, leakage of fluid or vaginal bleeding  -denies preeclampsia symptoms including headache, changes in vision or RUQ pain    Personal History     OB History    Para Term  AB Living   2 1 0 1 0 1   SAB IAB Ectopic Molar Multiple Live Births   0 0 0 0 0 0      # Outcome Date GA Lbr Marco A/2nd Weight Sex Type Anes PTL Lv   2 Current            1  22 34w0d  2863 g (6 lb 5 oz) M Vag-Spont         Complications: Pre-eclampsia       Past Medical History:   Diagnosis Date    Migraine 24    I get migraines really bad all throughout the day and night       Past Surgical History:   Procedure Laterality Date    TONSILLECTOMY         Family History: family history is not on file. Otherwise pertinent FHx was reviewed and not pertinent to current issue.    Social History:  reports that she has never smoked. She has never used smokeless tobacco. She reports that she does not currently use alcohol. She reports that she does not use drugs.    Home Medications:  PNV Prenatal  Plus Multivit+DHA, aspirin, metoclopramide, omeprazole, and ondansetron ODT    Allergies:  No Known Allergies    Objective   Objective     Vitals:   Temp:  [97.8 °F (36.6 °C)] 97.8 °F (36.6 °C)  Heart Rate:  [76-88] 86  Resp:  [16] 16  BP: (138-156)/() 154/102    Physical Exam     General: Well Developed, Well Nourished, not in acute distress   HEENT: normocephalic, atraumatic, conjunctiva non-icteric    Respiratory: non-labored, symmetrical chest rise   Gastrointestinal: gravid, soft, no TTP, no rebound tenderness or guarding to palpation, no palpable ctx   Neurologic: alert and oriented, CN II-XII grossly intact without focal deficit, DTRs 2+ lower extremities, no clonus   Psychiatric: normal mood, pleasant, cooperative  Musculoskeletal: negative calf tenderness, trace lower extremity edema  Skin: warm & dry, no cyanosis, no jaundice    Pelvic Exam:  deferred    Electronic Fetal Monitoring  Baseline [id number], moderate variability, (+) Accelerations, and (-) Decelerations  Tocometer: none   Interpretation: reassuring and reactive    Imaging  5/16  Intrauterine pregnancy at 31w2d  Placental location: Anterior  Normal Interval growth  EFW:17%ile, AC:26%ile  Estimated fetal weight:  1581 g  Fetal position: Vertex  KORY: 10.1 cm  DVP: 4.2 cm  Fetal heart rate: 125    Prenatal Labs  Lab Results   Component Value Date    HGB 9.8 (L) 06/14/2024    RUBELLAABIGG 2.28 12/18/2023    HEPBSAG Negative 12/18/2023    ABSCRN Normal 12/18/2023    OHS6KFX4 non-reactive 12/18/2023    HEPCVIRUSABY negative 12/18/2023     04/18/2024    URINECX 25,000 CFU/mL Mixed Alysha Isolated 06/12/2024    CHLAMNAA Negative 05/16/2024    NGONORRHON Negative 05/16/2024         Result Review    Result Review:  I have personally reviewed the results from the time of this admission to 6/14/2024 14:58 CDT and agree with these findings:  [x]  Laboratory list / accordion  [x]  Microbiology  [x]  Radiology  []  EKG/Telemetry   []   Cardiology/Vascular   []  Pathology  []  Old records  []  Other:  Most notable findings include:   Preelampsia Labs:    Results from last 7 days   Lab Units 24  1224   WBC 10*3/mm3 8.27   HEMOGLOBIN g/dL 9.8*   HEMATOCRIT % 31.3*   PLATELETS 10*3/mm3 207   POTASSIUM mmol/L 3.3*   ALT (SGPT) U/L 5   AST (SGOT) U/L 11   CREATININE mg/dL 0.68   BILIRUBIN mg/dL 0.6   LDH U/L 151   URIC ACID mg/dL 6.2*           Assessment & Plan   Assessment / Plan     Carmelita Powers is a 24 y.o. year old  with an 2024, by Last Menstrual Period currently at 35w3d presenting with elevated blood pressures. .    Pregnancy, 35w3d  Chronic hypertension  Maternal anemia    PLAN  -observation 24 hours  -start labetalol PO for hypertension. Previously not on medications  -24-hour urine protein  -monitor for signs/symptoms of severe features  -regular diet  -NST 1 hour each shift  -SCDs while in bed    Jason Rodriguez MD  2024  14:58 CDT

## 2024-06-14 NOTE — PLAN OF CARE
Goal Outcome Evaluation:  Plan of Care Reviewed With: patient           Outcome Evaluation: Pt sent from office with elevated BP; Labetalol 200 PO q 12; NST q 12 hrs; 24 hour urine in progress until 1345 on 6/15/24

## 2024-06-14 NOTE — PROGRESS NOTES
"Reason for visit: Routine OB visit at 35w3d     CC:  She feels more tired today and relates she has been having some episodes of contractions. She has been having some nausea without vomiting. Reports visual disturbances over the last couple of days which have ranged from dark spots when she blinks or looks down and then up. She has also noted flutters \"when just looking out.\" She has had headaches over the last few days but not at present. Endorses good fetal movement. Denies VB, LOF, or right upper quadrant pain.     ROS: All systems reviewed and are negative with exception of the following: nausea, visual changes, fatigue, amenorrhea, contractions    Weight 102 kg (224 lb); /84; ; FH 35 cm  Total weight gain: -2.722 kg (-6 lb) with Total weight gain expected 5 kg (11 lb)-9 kg (19 lb)    Urine today and reviewed: negative glucose, 2+ protein    31-week Anatomy scan: EFW 17%, 1581 g; AC 26%; KORY 10.1 cm, DVP 4.2 cm; normal interval growth; vertex presentation; anterior placenta     Exam:  General Appearance:  No visualized signs of distress. Normal mood and behavior  Cardiorespiratory: HR str and reg. Lungs clear. Resp even and unlabored.  Abdomen: is soft and nontender. No CVA tenderness. Uterus is consistent with estimated gestational age.  Ext: No edema. Calves non-tender.     Impression  Diagnoses and all orders for this visit:    1. 35 weeks gestation of pregnancy (Primary)  Discussed and encouraged practicing measures of relaxation during the birthing experience. Also discussed measures to support or promote labor at term.   -     POC Urinalysis Dipstick  -     Chlamydia trachomatis, Neisseria gonorrhoeae, Trichomonas vaginalis, PCR - Urine, Urine, Random Void  -     Ferric Maltol (ACCRUFeR) 30 MG capsule; Take 1 capsule by mouth 2 (Two) Times a Day.  Dispense: 10 capsule; Refill: 0  -     Ferric Maltol (ACCRUFeR) 30 MG capsule; Take 1 capsule by mouth 2 (Two) Times a Day.  Dispense: 60 capsule; " Refill: 3    2. Multigravida in third trimester  Discussed third trimester of pregnancy, discomforts and measures of support, fetal movement counts,  labor warnings, preeclampsia warnings, and signs and symptoms to report. Discussed plan for next visit to include cultures for GBS, chlamydia, and gonorrhea. Patient to come to the hospital or call for vaginal bleeding, leaking of fluid, regular or intense contractions, or other concerns. Signs and Symptoms of Labor and Alternative Pain Management During Labor and Delivery videos included in the AVS.    3. Chronic hypertension in obstetric context in third trimester  Plan to sent to [facility] for serial blood pressure monitoring and hypertension related labs for elevated blood pressures in the office today, nausea with visual changes, proteinuria, recent headaches, and being chronic hypertension. Will adjust plan of care based upon findings.     4. Maternal anemia in pregnancy, antepartum  -     Ferric Maltol (ACCRUFeR) 30 MG capsule; Take 1 capsule by mouth 2 (Two) Times a Day.  Dispense: 10 capsule; Refill: 0  -     Ferric Maltol (ACCRUFeR) 30 MG capsule; Take 1 capsule by mouth 2 (Two) Times a Day.  Dispense: 60 capsule; Refill: 3    5. Trichomonal vaginitis during pregnancy in third trimester  Discussed with patient sending her urine as follow-up after taking prescription.   -     Chlamydia trachomatis, Neisseria gonorrhoeae, Trichomonas vaginalis, PCR - Urine, Urine, Random Void        Return to the office early next week for a routine prenatal visit with Dr. Ocampo with interval growth ultrasound with BPP for chronic hypertension and as needed with concerns.        This note has been signed electronically.    Swati Gallo, DNP, APRN, CNM, RNC-OB

## 2024-06-15 VITALS
HEART RATE: 83 BPM | SYSTOLIC BLOOD PRESSURE: 137 MMHG | DIASTOLIC BLOOD PRESSURE: 87 MMHG | TEMPERATURE: 98.2 F | HEIGHT: 64 IN | RESPIRATION RATE: 18 BRPM | BODY MASS INDEX: 39.27 KG/M2 | WEIGHT: 230 LBS

## 2024-06-15 LAB
COLLECT DURATION TIME UR: 24 HRS
CREAT UR-MCNC: 341.3 MG/DL
PROT 24H UR-MRATE: 200.5 MG/24HOURS (ref 0–150)
PROT ?TM UR-MCNC: 40.8 MG/DL
PROT/CREAT UR: 119.5 MG/G CREA (ref 0–200)
SPECIMEN VOL 24H UR: 500 ML

## 2024-06-15 PROCEDURE — 84156 ASSAY OF PROTEIN URINE: CPT | Performed by: ADVANCED PRACTICE MIDWIFE

## 2024-06-15 PROCEDURE — 99238 HOSP IP/OBS DSCHRG MGMT 30/<: CPT | Performed by: OBSTETRICS & GYNECOLOGY

## 2024-06-15 PROCEDURE — G0463 HOSPITAL OUTPT CLINIC VISIT: HCPCS

## 2024-06-15 PROCEDURE — 81050 URINALYSIS VOLUME MEASURE: CPT | Performed by: ADVANCED PRACTICE MIDWIFE

## 2024-06-15 PROCEDURE — 59025 FETAL NON-STRESS TEST: CPT

## 2024-06-15 PROCEDURE — 59025 FETAL NON-STRESS TEST: CPT | Performed by: OBSTETRICS & GYNECOLOGY

## 2024-06-15 RX ORDER — LABETALOL 200 MG/1
200 TABLET, FILM COATED ORAL EVERY 12 HOURS SCHEDULED
Qty: 60 TABLET | Refills: 2 | Status: SHIPPED | OUTPATIENT
Start: 2024-06-15 | End: 2024-06-18

## 2024-06-15 RX ADMIN — LABETALOL HYDROCHLORIDE 200 MG: 200 TABLET, FILM COATED ORAL at 01:22

## 2024-06-15 RX ADMIN — LABETALOL HYDROCHLORIDE 200 MG: 200 TABLET, FILM COATED ORAL at 13:07

## 2024-06-15 NOTE — NON STRESS TEST
Carmelita Powers, a  at 35w4d with an SANFORD of 2024, by Last Menstrual Period, was seen at [facility]OR DELIVERY for a nonstress test.    Chief Complaint   Patient presents with    Hypertension-Pregnant     Sent from office       Patient Active Problem List   Diagnosis    Pregnancy    Chronic hypertension in obstetric context    Maternal anemia in pregnancy, antepartum    Trichomonal vaginitis during pregnancy in third trimester    Spotting complicating pregnancy in third trimester    Preexisting hypertension complicating pregnancy, antepartum, third trimester       Start Time: 1036  Stop Time: 1100    Interpretation A  Nonstress Test Interpretation A: Reactive  Comments A: Verified per CHANCE Roach RNC/JESSICA Wilson RN

## 2024-06-15 NOTE — NON STRESS TEST
Carmelita Danica Powers, a  at 35w4d with an SANFORD of 2024, by Last Menstrual Period, was seen at [facility]OR DELIVERY for a nonstress test.    Chief Complaint   Patient presents with    Hypertension-Pregnant     Sent from office       Patient Active Problem List   Diagnosis    Pregnancy    Chronic hypertension in obstetric context    Maternal anemia in pregnancy, antepartum    Trichomonal vaginitis during pregnancy in third trimester    Spotting complicating pregnancy in third trimester    Preexisting hypertension complicating pregnancy, antepartum, third trimester       Start Time: 0150  Stop Time: 0210    Interpretation A  Nonstress Test Interpretation A: Reactive  Comments A: verified with JULIANNE Lorenzo RN

## 2024-06-15 NOTE — PROGRESS NOTES
Jason Rodriguez MD  [facility]  [address]  [place] [address]  Office [phone]  Fax [phone]      [facility]  Carmelita Powers  : 2000  MRN: [id number]  CSN: [id number]    Antepartum Progress note    Subjective   She reports is having no problems. Denies preeclampsia symptoms. Endorses appropriate fetal movement. Denies contractions or leakage of fluid.      Objective   Min/max vitals past 24 hours:  Temp  Min: 97.4 °F (36.3 °C)  Max: 98.6 °F (37 °C)   BP  Min: 100/83  Max: 156/108   Pulse  Min: 63  Max: 119   Resp  Min: 16  Max: 18        General: Well Developed, Well Nourished, not in acute distress   HEENT: normocephalic, atraumatic, conjunctiva non-icteric    Respiratory: non-labored, symmetrical chest rise   Cardiovascular: regular rate, no JVD  Gastrointestinal: gravid, soft, no TTP, no rebound tenderness or guarding to palpation, no palpable ctx   Neurologic: alert and oriented, CN II-XII grossly intact without focal deficit, DTRs 2+ lower extremities, no clonus   Psychiatric: normal mood, pleasant, cooperative  Musculoskeletal: negative calf tenderness, no lower extremity edema  Skin: warm & dry, no cyanosis, no jaundice    Pelvic Exam:  deferred    Fetal Monitoring  FHT's: Reactive and reassuring   Contractions: Irregular contractions overnight        Preelampsia Labs:    Results from last 7 days   Lab Units 24  1350 24  1224   WBC 10*3/mm3  --  8.27   HEMOGLOBIN g/dL  --  9.8*   HEMATOCRIT %  --  31.3*   PLATELETS 10*3/mm3  --  207   POTASSIUM mmol/L  --  3.3*   ALT (SGPT) U/L  --  5   AST (SGOT) U/L  --  11   CREATININE mg/dL  --  0.68   BILIRUBIN mg/dL  --  0.6   LDH U/L  --  151   URIC ACID mg/dL  --  6.2*   PROT/CREAT RATIO UR mg/G Crea 119.5  --          Assessment   IUP at 35w4d  Chronic hypertension  Maternal anemia     Plan   1.   Now s/p observation and 24-hour urine protein. Urine protein <300. Blood pressures stable on labetalol. Stable for  outpatient management. Preeclampsia precautions and labor precautions reviewed. Follow-up next week as scheduled. Questions answered. She expressed understanding.     Jason Rodriguez MD  6/15/2024  08:06 CDT

## 2024-06-15 NOTE — PLAN OF CARE
Goal Outcome Evaluation:  Plan of Care Reviewed With: patient        Progress: improving  Outcome Evaluation: . 35w4d. 24 hr urine in progress. NST qshift. VSS. No c/o pain.

## 2024-06-15 NOTE — DISCHARGE SUMMARY
[facility]    Jason Rodriguez MD  [address]  [place] [address]  [phone]      Discharge Summary      Carmelita Powers  : 2000  MRN: [id number]  CSN: [id number]    Date of Admission: 2024   Date of Discharge:  6/15/2024   Delivering Physician: This patient has no babies on file.       Admission Diagnosis: Preexisting hypertension complicating pregnancy, antepartum, third trimester [O10.913]     Discharge Diagnosis: Pregnancy at 35w4d  Chronic hypertension in pregnancy, now on antihypertensives             Presenting Problem/History of Present Illness  Active Hospital Problems    Diagnosis  POA    **Preexisting hypertension complicating pregnancy, antepartum, third trimester [O10.913]  Yes      Resolved Hospital Problems   No resolved problems to display.        Hospital Course  Patient is a 24 y.o.  who was admitted on 2024 for blood pressure monitoring. Blood pressures had been elevated in clinic. She was started on labetalol and a 24-hour urine protein was collected. Blood pressures improved significantly. Labs not congruent with preeclampsia. Fetus reactive. She was stable for outpatient management. .    Procedures Performed  none       Consults:   Consults       No orders found for last 30 day(s).            Pertinent Test Results:   CBC          2024    13:24 2024    12:40 2024    12:24   CBC   WBC 9.5  8.9  8.27    RBC 3.98  4.15  4.12    Hemoglobin 10.3     9.9  10.0  9.8    Hematocrit 31.3  31.9  31.3    MCV 79  77  76.0    MCH 24.9  24.1  23.8    MCHC 31.6  31.3  31.3    RDW 13.3  13.1  13.3    Platelets 203  254  207        Condition on Discharge:  Stable    Vital Signs  Temp:  [97.4 °F (36.3 °C)-98.6 °F (37 °C)] 98.6 °F (37 °C)  Heart Rate:  [] 90  Resp:  [16-18] 16  BP: (100-156)/() 107/72    Physical Exam:   See day of discharge progress note for exam    Result Review   CBC          2024    13:24  2024    12:40 2024    12:24   CBC   WBC 9.5  8.9  8.27    RBC 3.98  4.15  4.12    Hemoglobin 10.3     9.9  10.0  9.8    Hematocrit 31.3  31.9  31.3    MCV 79  77  76.0    MCH 24.9  24.1  23.8    MCHC 31.6  31.3  31.3    RDW 13.3  13.1  13.3    Platelets 203  254  207          Postpartum Depression: High Risk (2024)    [place]  Depression Scale     Last EPDS Total Score: 13     Last EPDS Self Harm Result: Not on file          Discharge Disposition  Home or Self Care    Discharge Medications     Discharge Medications        New Medications        Instructions Start Date   ACCRUFeR 30 MG capsule  Generic drug: Ferric Maltol   30 mg, Oral, 2 Times Daily      ACCRUFeR 30 MG capsule  Generic drug: Ferric Maltol   30 mg, Oral, 2 Times Daily      labetalol 200 MG tablet  Commonly known as: NORMODYNE   200 mg, Oral, Every 12 Hours Scheduled             Continue These Medications        Instructions Start Date   aspirin 81 MG EC tablet   81 mg, Oral, Daily      metoclopramide 10 MG tablet  Commonly known as: Reglan   10 mg, Oral, 4 Times Daily      omeprazole 20 MG capsule  Commonly known as: priLOSEC   20 mg, Oral, Daily      ondansetron ODT 4 MG disintegrating tablet  Commonly known as: ZOFRAN-ODT   4 mg, Translingual, Every 8 Hours PRN      PNV Prenatal Plus Multivit+DHA 27-1 & 312 MG misc   1 tablet, Oral, Daily               Discharge Diet: home diet    Activity at Discharge: regular activity     Follow-up Appointments  Future Appointments   Date Time Provider Department Center   2024  9:30 AM [facility]   2024 10:30 AM Ally Ocampo MD [facility]   2024  1:45 PM Ally Ocampo MD [facility]   7/3/2024  1:00 PM Swati Gallo APRN [facility]       Follow-up for OB visit in 1 week.     Test Results Pending at Discharge: none       Jason Rodriguez MD  06/15/24  08:09 CDT    Time: Discharge <30 min

## 2024-06-16 ENCOUNTER — NURSE TRIAGE (OUTPATIENT)
Dept: CALL CENTER | Facility: HOSPITAL | Age: 24
End: 2024-06-16
Payer: COMMERCIAL

## 2024-06-16 LAB
C TRACH RRNA SPEC QL NAA+PROBE: NEGATIVE
N GONORRHOEA RRNA SPEC QL NAA+PROBE: NEGATIVE
T VAGINALIS RRNA SPEC QL NAA+PROBE: NEGATIVE

## 2024-06-17 ENCOUNTER — PATIENT OUTREACH (OUTPATIENT)
Dept: LABOR AND DELIVERY | Facility: HOSPITAL | Age: 24
End: 2024-06-17
Payer: COMMERCIAL

## 2024-06-17 NOTE — TELEPHONE ENCOUNTER
"Reason for Disposition   MILD or MODERATE ankle swelling (e.g., can't move joint normally, can't do usual activities) (Exceptions: Itchy, localized swelling; swelling is chronic.)    Additional Information   Negative: Chest pain   Negative: Followed an ankle injury   Negative: [1] Followed a bee sting AND [2] localized swelling (e.g., small area of puffy or swollen skin)   Negative: [1] Followed an insect bite AND [2] localized swelling (e.g., small area of puffy or swollen skin)   Negative: Ankle pain is main symptom   Negative: Swelling of both ankles (i.e., pedal edema)   Negative: Swelling of calf or leg is main symptom   Negative: Difficulty breathing   Negative: Entire foot is cool or blue in comparison to other side   Negative: [1] Ankle pain AND [2] fever   Negative: [1] Ankle redness AND [2] fever   Negative: Patient sounds very sick or weak to the triager   Negative: [1] SEVERE pain (e.g., excruciating, unable to walk) AND [2] not improved after 2 hours of pain medicine   Negative: [1] Redness AND [2] painful when touched AND [3] no fever   Negative: [1] Red area or streak AND [2] large (> 2 in. or 5 cm)   Negative: [1] Thigh or calf pain AND [2] only 1 side AND [3] present > 1 hour   Negative: [1] Thigh, calf, or ankle swelling AND [2] only 1 side   Negative: [1] Thigh, calf, or ankle swelling AND [2] bilateral AND [3] 1 side is more swollen   Negative: SEVERE swelling (e.g., can't move swollen ankle at all)   Negative: Looks like a boil, infected sore, deep ulcer or other infected rash (spreading redness, pus)    Answer Assessment - Initial Assessment Questions  1. LOCATION: \"Which ankle is swollen?\" \"Where is the swelling?\"      Started several days ago, left ankle  2. ONSET: \"When did the swelling start?\"      Several days was released from hosptial yesterday and being started on bp meds  3. SWELLING: \"How bad is the swelling?\" Or, \"How large is it?\" (e.g., mild, moderate, severe; size of localized " "swelling)     - NONE: No joint swelling.    - LOCALIZED: Localized; small area of puffy or swollen skin (e.g., insect bite, skin irritation).    - MILD: Joint looks or feels mildly swollen or puffy.    - MODERATE: Swollen; interferes with normal activities (e.g., work or school); decreased range of movement; may be limping.    - SEVERE: Very swollen; can't move swollen joint at all; limping a lot or unable to walk.      moderate  4. PAIN: \"Is there any pain?\" If Yes, ask: \"How bad is it?\" (Scale 1-10; or mild, moderate, severe)    - NONE (0): no pain.    - MILD (1-3): doesn't interfere with normal activities.     - MODERATE (4-7): interferes with normal activities (e.g., work or school) or awakens from sleep, limping.     - SEVERE (8-10): excruciating pain, unable to do any normal activities, unable to walk.       na  5. CAUSE: \"What do you think caused the ankle swelling?\"      Increase bp and preg  6. OTHER SYMPTOMS: \"Do you have any other symptoms?\" (e.g., fever, chest pain, difficulty breathing, calf pain)      denies  7. PREGNANCY: \"Is there any chance you are pregnant?\" \"When was your last menstrual period?\"      yes    Protocols used: Ankle Swelling-ADULT-AH    "

## 2024-06-17 NOTE — OUTREACH NOTE
[facility]  Check-In    Current Estimated Gestational Age: 35w6d      EPDS questionnaire sent to Carmelita in [facility] prior to our telephone check-in this week.     Lorene Márquez RN  Maternity Nurse Navigator    6/17/2024, 14:04 CDT

## 2024-06-17 NOTE — PAYOR COMM NOTE
"ADMIT 6/14/2024 AND DISCHARGED 6/15/2024  ONE DAYS INPATIENT    [facility]  AMITA,CM    [phone]  OR   FAX    [phone]    Priya Dior Danica (24 y.o. Female)       Date of Birth   2000    Social Security Number       Address   [address] [place] [address] [address] [facility] [address]    Home Phone   [phone]    MRN   [id number]       Orthodox   Unknown    Marital Status   Single                            Admission Date   6/14/24    Admission Type   Elective    Admitting Provider   Ally Ocampo MD    Attending Provider       Department, Room/Bed   [facility] LABOR DELIVERY, L02/1       Discharge Date   6/15/2024    Discharge Disposition   Home or Self Care    Discharge Destination                                 Attending Provider: (none)   Allergies: No Known Allergies    Isolation: None   Infection: COVID (confirmed) (06/02/24)   Code Status: Prior    Ht: 162.6 cm (64\")   Wt: 104 kg (230 lb)    Admission Cmt: None   Principal Problem: Preexisting hypertension complicating pregnancy, antepartum, third trimester [O10.913]                   Active Insurance as of 6/14/2024       Primary Coverage       Payor Plan Insurance Group Employer/Plan Group    ANTHEM BLUE CROSS ANTHEM BLUE CROSS BLUE SHIELD PPO [id number]       Payor Plan Address Payor Plan Phone Number Payor Plan Fax Number Effective Dates    [address] [phone]  1/1/2024 - None Entered    [facility] [id number]         Subscriber Name Subscriber Birth Date Member ID       DIOR FLORES DANICA 2000 [id number]               Secondary Coverage       Payor Plan Insurance Group Employer/Plan Group    [place]        Payor Plan Address Payor Plan Phone Number Payor Plan Fax Number Effective Dates    [address]   11/1/2023 - None Entered    [facility] [id number]         Subscriber Name Subscriber Birth Date Member ID       DIOR FLORES DANICA 2000 [id number]               "       Emergency Contacts        (Rel.) Home Phone Work Phone Mobile Phone    BECCA FLORES (Mother) [phone] -- --          Edda Buenrostro, RN   Registered Nurse  Obstetrics     Plan of Care     Signed     Date of Service: 24  Creation Time: 24     Signed         Goal Outcome Evaluation:  Plan of Care Reviewed With: patient  Outcome Evaluation: Pt sent from office with elevated BP; Labetalol 200 PO q 12; NST q 12 hrs; 24 hour urine in progress until 1345 on 6/15/24                 Edda Fowler, RN   Registered Nurse  Nursing     Plan of Care     Signed     Date of Service: 06/15/24 0517  Creation Time: 06/15/24 0517     Signed         Goal Outcome Evaluation:  Plan of Care Reviewed With: patient  Progress: improving  Outcome Evaluation: . 35w4d. 24 hr urine in progress. NST qshift. VSS. No c/o pain.                    History & Physical        Jason Rodriguez MD at 24 [facility]7           [facility]  HISTORY AND PHYSICAL, OBGYN    Patient Name: Carmelita Flores  : 2000  MRN: [id number]  Primary Care Physician:  Provider, No Known  Date of admission: 2024    Subjective  Subjective     Chief Complaint:   Chief Complaint   Patient presents with    Hypertension-Pregnant     Sent from office       HPI: Carmelita Flores is a 24 y.o. year old  with an 2024, by Last Menstrual Period currently at 35w3d presenting with  elevated blood pressures . She reports doing ok at the moment. Has had intermittent episodes of blurry vision. None currently. Denies preeclampsia symptoms at this time.     Prenatal care has been with Ally Ocampo MD  It has been noteworthy for:  Preexisting hypertension diagnosed during pregnancy, not on antihypertensives  Maternal anemia    ROS:  All systems were reviewed and negative except for:   -endorses appropriate fetal movement  -denies regular contractions, leakage of fluid or vaginal bleeding  -denies  preeclampsia symptoms including headache, changes in vision or RUQ pain    Personal History     OB History    Para Term  AB Living   2 1 0 1 0 1   SAB IAB Ectopic Molar Multiple Live Births   0 0 0 0 0 0      # Outcome Date GA Lbr Marco A/2nd Weight Sex Type Anes PTL Lv   2 Current            1  22 34w0d  2863 g (6 lb 5 oz) M Vag-Spont         Complications: Pre-eclampsia       Past Medical History:   Diagnosis Date    Migraine 24    I get migraines really bad all throughout the day and night       Past Surgical History:   Procedure Laterality Date    TONSILLECTOMY         Family History: family history is not on file. Otherwise pertinent FHx was reviewed and not pertinent to current issue.    Social History:  reports that she has never smoked. She has never used smokeless tobacco. She reports that she does not currently use alcohol. She reports that she does not use drugs.    Home Medications:  PNV Prenatal Plus Multivit+DHA, aspirin, metoclopramide, omeprazole, and ondansetron ODT    Allergies:  No Known Allergies    Objective  Objective     Vitals:   Temp:  [97.8 °F (36.6 °C)] 97.8 °F (36.6 °C)  Heart Rate:  [76-88] 86  Resp:  [16] 16  BP: (138-156)/() 154/102    Physical Exam     General: Well Developed, Well Nourished, not in acute distress   HEENT: normocephalic, atraumatic, conjunctiva non-icteric    Respiratory: non-labored, symmetrical chest rise   Gastrointestinal: gravid, soft, no TTP, no rebound tenderness or guarding to palpation, no palpable ctx   Neurologic: alert and oriented, CN II-XII grossly intact without focal deficit, DTRs 2+ lower extremities, no clonus   Psychiatric: normal mood, pleasant, cooperative  Musculoskeletal: negative calf tenderness, trace lower extremity edema  Skin: warm & dry, no cyanosis, no jaundice    Pelvic Exam:  deferred    Electronic Fetal Monitoring  Baseline [id number], moderate variability, (+) Accelerations, and (-)  Decelerations  Tocometer: none   Interpretation: reassuring and reactive    Imaging    Intrauterine pregnancy at 31w2d  Placental location: Anterior  Normal Interval growth  EFW:17%ile, AC:26%ile  Estimated fetal weight:  1581 g  Fetal position: Vertex  KORY: 10.1 cm  DVP: 4.2 cm  Fetal heart rate: 125    Prenatal Labs  Lab Results   Component Value Date    HGB 9.8 (L) 2024    RUBELLAABIGG 2.28 2023    HEPBSAG Negative 2023    ABSCRN Normal 2023    [id number] non-reactive 2023    HEPCVIRUSABY negative 2023     2024    URINECX 25,000 CFU/mL Mixed Alysha Isolated 2024    CHLAMNAA Negative 2024    NGONORRHON Negative 2024         Result Review   Result Review:  I have personally reviewed the results from the time of this admission to 2024 14:58 CDT and agree with these findings:  [x]  Laboratory list / accordion  [x]  Microbiology  [x]  Radiology  []  EKG/Telemetry   []  Cardiology/Vascular   []  Pathology  []  Old records  []  Other:  Most notable findings include:   Preelampsia Labs:    Results from last 7 days   Lab Units 24  1224   WBC 10*3/mm3 8.27   HEMOGLOBIN g/dL 9.8*   HEMATOCRIT % 31.3*   PLATELETS 10*3/mm3 207   POTASSIUM mmol/L 3.3*   ALT (SGPT) U/L 5   AST (SGOT) U/L 11   CREATININE mg/dL 0.68   BILIRUBIN mg/dL 0.6   LDH U/L 151   URIC ACID mg/dL 6.2*           Assessment & Plan  Assessment / Plan     Carmelita Powers is a 24 y.o. year old  with an 2024, by Last Menstrual Period currently at 35w3d presenting with elevated blood pressures. .    Pregnancy, 35w3d  Chronic hypertension  Maternal anemia    PLAN  -observation 24 hours  -start labetalol PO for hypertension. Previously not on medications  -24-hour urine protein  -monitor for signs/symptoms of severe features  -regular diet  -NST 1 hour each shift  -SCDs while in bed    Jason Rodriguez MD  2024  14:58 CDT      Electronically signed by Jason Rodriguez MD  at 24 1503          Discharge Summaryl        Magdalena Roach, RN at 06/15/24 1123       Attestation signed by Jason Rodriguez MD at 06/15/24 1215    I have reviewed the notes, assessments, and/or procedures performed. I concur with the above documentation of Carmelita Powers.      Reactive NST, no contractions                  Carmelita Powers, jhonathan  at 35w4d with an SANFORD of 2024, by Last Menstrual Period, was seen at [facility] LABOR DELIVERY for a nonstress test.    Chief Complaint   Patient presents with    Hypertension-Pregnant     Sent from office       Patient Active Problem List   Diagnosis    Pregnancy    Chronic hypertension in obstetric context    Maternal anemia in pregnancy, antepartum    Trichomonal vaginitis during pregnancy in third trimester    Spotting complicating pregnancy in third trimester    Preexisting hypertension complicating pregnancy, antepartum, third trimester       Start Time: 1036  Stop Time: 1100    Interpretation A  Nonstress Test Interpretation A: Reactive  Comments A: Verified per CHANCE Roach RNC/JESSICA Wilson RN                   Electronically signed by Jason Rodriguez MD at 06/15/24 1215       Jason Rodriguez MD at 06/15/24 0806              Jason Rodriguez MD  [facility]  [address] [place] [address] [address] [place] [address]  Office [phone]  Fax [phone]      [facility]  Carmelita Powers  : 2000  MRN: [id number]  CSN: [id number]    Antepartum Progress note    Subjective  She reports is having no problems. Denies preeclampsia symptoms. Endorses appropriate fetal movement. Denies contractions or leakage of fluid.      Objective  Min/max vitals past 24 hours:  Temp  Min: 97.4 °F (36.3 °C)  Max: 98.6 °F (37 °C)   BP  Min: 100/83  Max: 156/108   Pulse  Min: 63  Max: 119   Resp  Min: 16  Max: 18        General: Well Developed, Well Nourished, not in acute distress   HEENT: normocephalic, atraumatic, conjunctiva non-icteric    Respiratory:  non-labored, symmetrical chest rise   Cardiovascular: regular rate, no JVD  Gastrointestinal: gravid, soft, no TTP, no rebound tenderness or guarding to palpation, no palpable ctx   Neurologic: alert and oriented, CN II-XII grossly intact without focal deficit, DTRs 2+ lower extremities, no clonus   Psychiatric: normal mood, pleasant, cooperative  Musculoskeletal: negative calf tenderness, no lower extremity edema  Skin: warm & dry, no cyanosis, no jaundice    Pelvic Exam:  deferred    Fetal Monitoring  FHT's: Reactive and reassuring   Contractions: Irregular contractions overnight        Preelampsia Labs:    Results from last 7 days   Lab Units 24  1350 24  1224   WBC 10*3/mm3  --  8.27   HEMOGLOBIN g/dL  --  9.8*   HEMATOCRIT %  --  31.3*   PLATELETS 10*3/mm3  --  207   POTASSIUM mmol/L  --  3.3*   ALT (SGPT) U/L  --  5   AST (SGOT) U/L  --  11   CREATININE mg/dL  --  0.68   BILIRUBIN mg/dL  --  0.6   LDH U/L  --  151   URIC ACID mg/dL  --  6.2*   PROT/CREAT RATIO UR mg/G Crea 119.5  --          Assessment  IUP at 35w4d  Chronic hypertension  Maternal anemia     Plan  1.   Now s/p observation and 24-hour urine protein. Urine protein <300. Blood pressures stable on labetalol. Stable for outpatient management. Preeclampsia precautions and labor precautions reviewed. Follow-up next week as scheduled. Questions answered. She expressed understanding.     Jason Rodriguez MD  6/15/2024  08:06 CDT              Electronically signed by Jason Rodriguez MD at 06/15/24 0808       Edda Fowler, RN at 06/15/24 0517          Goal Outcome Evaluation:  Plan of Care Reviewed With: patient        Progress: improving  Outcome Evaluation: . 35w4d. 24 hr urine in progress. NST qshift. VSS. No c/o pain.                                 Electronically signed by Edda Fowler, RN at 06/15/24 05       Edda Fowler, RN at 06/15/24 0220       Attestation signed by Jason Rodriguez MD at 06/15/24 0631    I have reviewed  the notes, assessments, and/or procedures performed. I concur with the above documentation of Carmelita Powers.     Reactive tracing. Irregular contractions.                   Carmelita Powers, a  at 35w4d with an SANFORD of 2024, by Last Menstrual Period, was seen at [facility] LABOR DELIVERY for a nonstress test.    Chief Complaint   Patient presents with    Hypertension-Pregnant     Sent from office       Patient Active Problem List   Diagnosis    Pregnancy    Chronic hypertension in obstetric context    Maternal anemia in pregnancy, antepartum    Trichomonal vaginitis during pregnancy in third trimester    Spotting complicating pregnancy in third trimester    Preexisting hypertension complicating pregnancy, antepartum, third trimester       Start Time: 0150  Stop Time: 0210    Interpretation A  Nonstress Test Interpretation A: Reactive  Comments A: verified with JULIANNE Lorenzo RN                   Electronically signed by Jason Rodriguez MD at 06/15/24 0631       Edda Buenrostro, RN at 24 1838          Goal Outcome Evaluation:  Plan of Care Reviewed With: patient           Outcome Evaluation: Pt sent from office with elevated BP; Labetalol 200 PO q 12; NST q 12 hrs; 24 hour urine in progress until 1345 on 6/15/24                                 Electronically signed by Edda Buenrostro RN at 24 1838       Jason Rodriguez MD at 24 1457           [facility]  HISTORY AND PHYSICAL, OBGYN    Patient Name: Carmelita Powers  : 2000  MRN: [id number]  Primary Care Physician:  Provider, No Known  Date of admission: 2024    Subjective  Subjective     Chief Complaint:   Chief Complaint   Patient presents with    Hypertension-Pregnant     Sent from office       HPI: Carmelita Powers is a 24 y.o. year old  with an 2024, by Last Menstrual Period currently at 35w3d presenting with  elevated blood pressures . She reports doing ok at the moment. Has had  intermittent episodes of blurry vision. None currently. Denies preeclampsia symptoms at this time.     Prenatal care has been with Ally Ocampo MD  It has been noteworthy for:  Preexisting hypertension diagnosed during pregnancy, not on antihypertensives  Maternal anemia    ROS:  All systems were reviewed and negative except for:   -endorses appropriate fetal movement  -denies regular contractions, leakage of fluid or vaginal bleeding  -denies preeclampsia symptoms including headache, changes in vision or RUQ pain    Personal History     OB History    Para Term  AB Living   2 1 0 1 0 1   SAB IAB Ectopic Molar Multiple Live Births   0 0 0 0 0 0      # Outcome Date GA Lbr Marco A/2nd Weight Sex Type Anes PTL Lv   2 Current            1  22 34w0d  2863 g (6 lb 5 oz) M Vag-Spont         Complications: Pre-eclampsia       Past Medical History:   Diagnosis Date    Migraine 24    I get migraines really bad all throughout the day and night       Past Surgical History:   Procedure Laterality Date    TONSILLECTOMY         Family History: family history is not on file. Otherwise pertinent FHx was reviewed and not pertinent to current issue.    Social History:  reports that she has never smoked. She has never used smokeless tobacco. She reports that she does not currently use alcohol. She reports that she does not use drugs.    Home Medications:  PNV Prenatal Plus Multivit+DHA, aspirin, metoclopramide, omeprazole, and ondansetron ODT    Allergies:  No Known Allergies    Objective  Objective     Vitals:   Temp:  [97.8 °F (36.6 °C)] 97.8 °F (36.6 °C)  Heart Rate:  [76-88] 86  Resp:  [16] 16  BP: (138-156)/() 154/102    Physical Exam     General: Well Developed, Well Nourished, not in acute distress   HEENT: normocephalic, atraumatic, conjunctiva non-icteric    Respiratory: non-labored, symmetrical chest rise   Gastrointestinal: gravid, soft, no TTP, no rebound tenderness or guarding to  palpation, no palpable ctx   Neurologic: alert and oriented, CN II-XII grossly intact without focal deficit, DTRs 2+ lower extremities, no clonus   Psychiatric: normal mood, pleasant, cooperative  Musculoskeletal: negative calf tenderness, trace lower extremity edema  Skin: warm & dry, no cyanosis, no jaundice    Pelvic Exam:  deferred    Electronic Fetal Monitoring  Baseline [id number], moderate variability, (+) Accelerations, and (-) Decelerations  Tocometer: none   Interpretation: reassuring and reactive    Imaging    Intrauterine pregnancy at 31w2d  Placental location: Anterior  Normal Interval growth  EFW:17%ile, AC:26%ile  Estimated fetal weight:  1581 g  Fetal position: Vertex  KORY: 10.1 cm  DVP: 4.2 cm  Fetal heart rate: 125    Prenatal Labs  Lab Results   Component Value Date    HGB 9.8 (L) 2024    RUBELLAABIGG 2.28 2023    HEPBSAG Negative 2023    ABSCRN Normal 2023    [id number] non-reactive 2023    HEPCVIRUSABY negative 2023     2024    URINECX 25,000 CFU/mL Mixed Alysha Isolated 2024    CHLAMNAA Negative 2024    NGONORRHON Negative 2024         Result Review   Result Review:  I have personally reviewed the results from the time of this admission to 2024 14:58 CDT and agree with these findings:  [x]  Laboratory list / accordion  [x]  Microbiology  [x]  Radiology  []  EKG/Telemetry   []  Cardiology/Vascular   []  Pathology  []  Old records  []  Other:  Most notable findings include:   Preelampsia Labs:    Results from last 7 days   Lab Units 24  1224   WBC 10*3/mm3 8.27   HEMOGLOBIN g/dL 9.8*   HEMATOCRIT % 31.3*   PLATELETS 10*3/mm3 207   POTASSIUM mmol/L 3.3*   ALT (SGPT) U/L 5   AST (SGOT) U/L 11   CREATININE mg/dL 0.68   BILIRUBIN mg/dL 0.6   LDH U/L 151   URIC ACID mg/dL 6.2*           Assessment & Plan  Assessment / Plan     Carmelita Dancia Powers is a 24 y.o. year old  with an 2024, by Last Menstrual Period currently  at 35w3d presenting with elevated blood pressures. .    Pregnancy, 35w3d  Chronic hypertension  Maternal anemia    PLAN  -observation 24 hours  -start labetalol PO for hypertension. Previously not on medications  -24-hour urine protein  -monitor for signs/symptoms of severe features  -regular diet  -NST 1 hour each shift  -SCDs while in bed    Jason Rodriguez MD  6/14/2024  14:58 CDT      Electronically signed by Jason Rodriguez MD at 06/14/24 1503       Vital Signs (last 3 days) before discharge       Date/Time Temp Temp src Pulse Resp BP Patient Position    06/15/24 1100 -- -- 83 18 137/87 --    06/15/24 0900 98.2 (36.8) Oral 72 18 130/82 --    06/15/24 0700 -- -- 90 16 107/72 Lying    06/15/24 0600 -- -- 76 16 125/83 Lying    06/15/24 0500 -- -- 82 16 130/77 Lying    06/15/24 0407 -- -- 71 16 132/86 Lying    06/15/24 0300 -- -- 77 16 123/77 Lying    06/15/24 0200 -- -- 80 16 109/57 Lying    06/15/24 0100 -- -- 92 16 125/81 Lying    06/15/24 0000 -- -- 63 16 123/63 Lying    06/14/24 2300 -- -- 80 16 133/87 Lying    06/14/24 2200 -- -- 77 16 106/60 Lying    06/14/24 2100 -- -- 97 16 133/93 Lying    06/14/24 2000 -- -- 85 16 130/89 Lying    06/14/24 1910 98.6 (37) Oral 119 16 116/64 Lying    06/14/24 1740 -- -- 95 -- 100/83 --    06/14/24 1701 -- -- 87 -- 103/54 --    06/14/24 1631 97.4 (36.3) Oral 87 18 110/67 Lying    06/14/24 1601 -- -- 90 -- 110/68 --    06/14/24 1531 -- -- 87 -- 121/70 --    06/14/24 1501 -- -- 75 -- 126/87 --    06/14/24 1431 -- -- 83 -- 149/90 --    06/14/24 1425 -- -- 73 -- 142/86 --    06/14/24 1415 -- -- 93 -- 132/78 --    06/14/24 1400 -- -- 81 -- 117/77 --    06/14/24 1350 -- -- 82 -- 117/85 --    06/14/24 1315 -- -- 86 -- 154/102 --    06/14/24 1301 -- -- 84 -- 144/97 --    06/14/24 1259 -- -- 80 -- 149/100 --    06/14/24 1245 -- -- 76 -- 156/108 --    06/14/24 1231 -- -- 88 -- 140/87 Lying    06/14/24 1229 -- -- 87 -- 140/80 Lying    06/14/24 1226 97.8 (36.6) Oral 83 16 138/92 Lying           Oxygen Therapy (last 3 days) before discharge       None          Intake & Output (last 3 days)       None           Facility-Administered Medications as of 6/15/2024   Medication Dose Route Frequency Provider Last Rate Last Admin    [COMPLETED] cefTRIAXone (ROCEPHIN) 2,000 mg in sodium chloride 0.9 % 100 mL MBP  2,000 mg Intravenous Once Regina Reaves MD   Stopped at 06/12/24 0625    [COMPLETED] lactated ringers bolus 2,000 mL  2,000 mL Intravenous Once Regina Reaves MD   Stopped at 06/12/24 0702     Orders (last 72 hrs)        Start     Ordered    06/15/24 0809  Discharge patient  Once         06/15/24 0809    06/15/24 0000  labetalol (NORMODYNE) 200 MG tablet  Every 12 Hours Scheduled         06/15/24 0809    06/14/24 2100  sodium chloride 0.9 % flush 10 mL  Every 12 Hours Scheduled,   Status:  Discontinued         06/14/24 1505    06/14/24 1600  Vital Signs q 4 while awake  Every 4 Hours,   Status:  Canceled      Comments: While the patient is awake.    06/14/24 1505    06/14/24 1506  Weigh Patient Daily  Daily,   Status:  Canceled       06/14/24 1505    06/14/24 1506  NST Low risk >24 weeks:  Monitor for 30 minutes BID (Antepartum)  Once,   Status:  Canceled        Comments: Fetal monitoring/NST:  Antepartum >24 weeks monitor fetus 60 minutes twice daily and PRN    06/14/24 1505    06/14/24 1505  calcium carbonate (TUMS) chewable tablet 500 mg (200 mg elemental)  3 Times Daily PRN,   Status:  Discontinued         06/14/24 1505    06/14/24 1505  Diet: Regular/House; Fluid Consistency: Thin (IDDSI 0)  Diet Effective Now,   Status:  Canceled         06/14/24 1505    06/14/24 1504  Admit To Obstetrics Inpatient  Once         06/14/24 1505    06/14/24 1504  Code Status and Medical Interventions:  Continuous,   Status:  Canceled         06/14/24 1505    06/14/24 1504  Place Sequential Compression Device  Once,   Status:  Canceled         06/14/24 1505    06/14/24 1504  Maintain  Sequential Compression Device  Continuous,   Status:  Canceled         24 1505    24 1504  Intermittent Auscultation FOR LOW RISK PATIENTS - NST on Admission Along With Intermittent Auscultation of Fetal Heart Tones.  Per Order Details,   Status:  Canceled        Comments: Intermittent Auscultation FOR LOW RISK PATIENTS - NST on Admission Along With Intermittent Auscultation of Fetal Heart Tones and PRN    24 1505    24 1504  NST Low risk >24 weeks:  Monitor for 30 minutes BID (Antepartum)  Once,   Status:  Canceled        Comments: Fetal monitoring/NST:  Antepartum >24 weeks monitor fetus 30 minutes twice daily and PRN    24 1505    24 1504  Antepartum Patients  <24 Weeks - Document Fetal Heart Tones Daily and PRN.  Per Order Details,   Status:  Canceled        Comments: For Antepartum Patients Less Than 24 Weeks - Document Fetal Heart Tones Daily & PRN.    24 1505    24 1504  Notify Provider (Specified)  Continuous,   Status:  Canceled        Comments: Open Order Report to View Parameters Requiring Provider Notification    24 1505    24 1504  Notify Provider of Tachysystole (Per Hospital Algorithm)  Continuous,   Status:  Canceled        Comments: Open Order Report to View Parameters Requiring Provider Notification    24 1505    24 1504  Notify Provider if Membranes Ruptured, Bleeding Greater Than 1 Pad Per Hour, Fetal Heart Tone Abnormality or Severe Pain  Continuous,   Status:  Canceled        Comments: Open Order Report to View Parameters Requiring Provider Notification    24 1505    24 1504  Initiate Group Beta Strep (GBS) Prophylaxis Protocol, If Criteria Met  Continuous,   Status:  Canceled        Comments: NO TREATMENT RECOMMENDED IF: 1) Maternal GBS Status Known Negative 2) Scheduled  Birth With Intact Membranes, Not in Labor 3) Maternal GBS Status Unknown, No Risk Factors  TREAT WITH ANTIBIOTICS IF:  1) Maternal  "GBS Status Known Positive 2) Maternal GBS Status Unknown With Risk Factors: a)  Previous Infant Affected By GBS Infection b) GBS Urinary Tract Infection (UTI) or Bacteriuria During Pregnancy c) Unexplained Maternal Fever (100.4F (38C) or Greater) During Labor d)  Prolonged Rupture of Membranes (18 or More Hours) e) Gestational Age Less Than 37 Weeks    06/14/24 1505    06/14/24 1504  Position Change - For Intra-Uterine Resusitation for Hypertonus, HyperStimulation or Non-Reassuring Fetal Status  As Needed,   Status:  Canceled       06/14/24 1505    06/14/24 1504  Insert Peripheral IV  Once,   Status:  Canceled         06/14/24 1505    06/14/24 1504  Saline Lock & Maintain IV Access  Continuous,   Status:  Canceled         06/14/24 1505    06/14/24 1504  sodium chloride 0.9 % flush 10 mL  As Needed,   Status:  Discontinued         06/14/24 1505    06/14/24 1504  sodium chloride 0.9 % infusion 40 mL  As Needed,   Status:  Discontinued         06/14/24 1505    06/14/24 1504  acetaminophen (TYLENOL) tablet 650 mg  Every 4 Hours PRN,   Status:  Discontinued         06/14/24 1505    06/14/24 1504  ondansetron ODT (ZOFRAN-ODT) disintegrating tablet 8 mg  Every 8 Hours PRN,   Status:  Discontinued        Placed in \"Or\" Linked Group    06/14/24 1505    06/14/24 1504  ondansetron (ZOFRAN) injection 4 mg  Every 8 Hours PRN,   Status:  Discontinued        Placed in \"Or\" Linked Group    06/14/24 1505    06/14/24 1504  docusate sodium (COLACE) capsule 100 mg  2 Times Daily PRN,   Status:  Discontinued         06/14/24 1505    06/14/24 1504  bisacodyl (DULCOLAX) suppository 10 mg  Daily PRN,   Status:  Discontinued         06/14/24 1505    06/14/24 1501  NST q shift  Misc Nursing Order (Specify)  Once,   Status:  Canceled        Comments: NST q shift    06/14/24 1500    06/14/24 1500  labetalol (NORMODYNE) tablet 200 mg  Every 12 Hours Scheduled,   Status:  Discontinued         06/14/24 1402    06/14/24 1403  Protein, Urine, 24 " Hour - Urine, Clean Catch  Once         24 1402                     Physician Progress Notes (last 72 hours)        Jason Rodriguez MD at 06/15/24 0806              Jason Rodriguez MD  [facility]  [address] [place] [address]  Office [phone]  Fax [phone]      [facility]  Carmelita Powers  : 2000  MRN: [id number]  CSN: [id number]    Antepartum Progress note    Subjective   She reports is having no problems. Denies preeclampsia symptoms. Endorses appropriate fetal movement. Denies contractions or leakage of fluid.     Objective   Min/max vitals past 24 hours:  Temp  Min: 97.4 °F (36.3 °C)  Max: 98.6 °F (37 °C)   BP  Min: 100/83  Max: 156/108   Pulse  Min: 63  Max: 119   Resp  Min: 16  Max: 18        General: Well Developed, Well Nourished, not in acute distress   HEENT: normocephalic, atraumatic, conjunctiva non-icteric    Respiratory: non-labored, symmetrical chest rise   Cardiovascular: regular rate, no JVD  Gastrointestinal: gravid, soft, no TTP, no rebound tenderness or guarding to palpation, no palpable ctx   Neurologic: alert and oriented, CN II-XII grossly intact without focal deficit, DTRs 2+ lower extremities, no clonus   Psychiatric: normal mood, pleasant, cooperative  Musculoskeletal: negative calf tenderness, no lower extremity edema  Skin: warm & dry, no cyanosis, no jaundice    Pelvic Exam:  deferred    Fetal Monitoring  FHT's: Reactive and reassuring   Contractions: Irregular contractions overnight       Preelampsia Labs:    Results from last 7 days   Lab Units 24  1350 24  1224   WBC 10*3/mm3  --  8.27   HEMOGLOBIN g/dL  --  9.8*   HEMATOCRIT %  --  31.3*   PLATELETS 10*3/mm3  --  207   POTASSIUM mmol/L  --  3.3*   ALT (SGPT) U/L  --  5   AST (SGOT) U/L  --  11   CREATININE mg/dL  --  0.68   BILIRUBIN mg/dL  --  0.6   LDH U/L  --  151   URIC ACID mg/dL  --  6.2*   PROT/CREAT RATIO UR mg/G Crea 119.5  --          Assessment   IUP at 35w4d  Chronic  "hypertension  Maternal anemia    Plan   1.   Now s/p observation and 24-hour urine protein. Urine protein <300. Blood pressures stable on labetalol. Stable for outpatient management. Preeclampsia precautions and labor precautions reviewed. Follow-up next week as scheduled. Questions answered. She expressed understanding.     Jason Rodriguez MD  6/15/2024  08:06 CDT             Electronically signed by Jason Rodriguez MD at 06/15/24 0808       6/15/2024 Event Details User   13:20 Patient Discharged  Magdalena Roach RN   13:09:09 Specimens Collected Protein, Urine, 24 Hour - Urine, Clean Catch  - ID: [id number] Type: 24 Hour Urine Magdalena Roach RN   13:07 Medication Given labetalol (NORMODYNE) tablet 200 mg - Dose: 200 mg ; Route: Oral ; Scheduled Time: 1307 ; Comment: Last dose was at 0122. Magdalena Roach RN   11:22 Fetal Testing Other flowsheet entries  Comments A: Verified per CHANCE Roach RNC/Magdalena Phillips RN, RN   11:00 Other Flowsheet Documentation Other flowsheet entries  BP: 137/87 (Device Time: 11:00:39)  Heart Rate: 83 (Device Time: 11:00:39)  Resp: 18  Acoustic Stimulator: No  Uterine Irritability: No  Contractions: Not present  Reason for NST: Antepartum Magdalena Roach RN   11:00 Peripheral IV 06/14/24 0905 Left Antecubital Removed Removal date: If not present on admission, USE TODAY'S DATE/Removal time: If not present on admission, USE TODAY'S TIME: 06/15/24 1100  Placement date: If unknown, DO NOT use \"Add Comment\" note/Placement time: If unknown, DO NOT use \"Add Comment\" note... Magdalena Roach RN   09:00 Vital Signs Other flowsheet entries  BP: 130/82 (Device Time: 09:00:51)  Temp: 98.2 °F (36.8 °C)  Temp src: Oral  Heart Rate: 72 (Device Time: 09:00:51)  Resp: 18 Magdalena Roach RN   09:00 Medication Canceled Entry sodium chloride 0.9 % flush 10 mL - Route: Intravenous ; Line: Peripheral IV 06/14/24 0905 Left Antecubital ; Scheduled Time: 0900 Magdalena Roach RN "   08:09:26 Discharge Orders Placed Medications  - labetalol (NORMODYNE) 200 MG tablet Jason Rodriguez MD   08:09:26 Orders Placed Discharge  - Discharge patient Jaosn Rodriguez MD   08:00 Adult PCS Body System Other flowsheet entries  (0-10) Pain Rating: Activity: 0  (0-10) Pain Rating: Rest: 0 Magdalena Roach RN   07:00 Vital Signs Other flowsheet entries  BP: 107/72 (Device Time: 07:00:46)  Heart Rate: 90 (Device Time: 07:00:46)  Resp: 16 Edda Fowler RN   06:48 OB Triage/Labor Other flowsheet entries  Response: No new orders  Provider Name: Dr. Rodriguez  Notification Route: At bedside Edda Fowler RN   06:00 Other Flowsheet Documentation Other flowsheet entries  BP: 125/83 (Device Time: 06:00:44)  Heart Rate: 76 (Device Time: 06:00:44)  Resp: 16  (0-10) Pain Rating: Activity: 0  (0-10) Pain Rating: Rest: 0 Edda Fowler RN   05:00 Other Flowsheet Documentation Other flowsheet entries  BP: 130/77 (Device Time: 05:00:40)  Heart Rate: 82 (Device Time: 05:00:40)  Resp: 16  (0-10) Pain Rating: Activity: 0  (0-10) Pain Rating: Rest: 0 Edda Fowler RN   04:07 Vital Signs Other flowsheet entries  BP: 132/86 (Device Time: 04:07:05)  Heart Rate: 71 (Device Time: 04:07:05)  Resp: 16 Edda Fowler RN   04:00 OB PCS Body System Other flowsheet entries  (0-10) Pain Rating: Activity: 0  (0-10) Pain Rating: Rest: 0 Edda Fowler RN   03:00 Other Flowsheet Documentation Other flowsheet entries  BP: 123/77 (Device Time: 03:00:46)  Heart Rate: 77 (Device Time: 03:00:46)  Resp: 16  (0-10) Pain Rating: Activity: 0  (0-10) Pain Rating: Rest: 0 Edda Fowler RN   02:17 Other Flowsheet Documentation Other flowsheet entries  Acoustic Stimulator: No  Uterine Irritability: No  Contractions: Irregular  Comments A: verified with JULIANNE Lorenzo RN  (0-10) Pain Rating: Activity: 0  (0-10) Pain Rating: Rest: 0  Contraction Frequency (Minutes): 4-10  Reason for NST: Antepartum  Nonstress Test Interpretation A: Reactive Malcolm,  KEREN Bañuelos   02:00 Vital Signs Other flowsheet entries  BP: 109/57 (Device Time: 02:00:49)  Heart Rate: 80 (Device Time: 02:00:49)  Resp: 16 Edda Fowler RN   01:22 Medication Given labetalol (NORMODYNE) tablet 200 mg - Dose: 200 mg ; Route: Oral ; Scheduled Time: 0220 Edda Fowler RN   01:00 Other Flowsheet Documentation Other flowsheet entries  BP: 125/81 (Device Time: 01:00:42)  Heart Rate: 92 (Device Time: 01:00:42)  Resp: 16  (0-10) Pain Rating: Activity: 0  (0-10) Pain Rating: Rest: 0 Edda Fowler RN   00:00 Vital Signs Other flowsheet entries  BP: 123/63 (Device Time: 00:00:57)  Heart Rate: 63 (Device Time: 00:00:57)  Resp: 16 Edda Fowler RN       6/14/2024 6/14/2024 Event Details User   23:00 Vital Signs Other flowsheet entries  BP: 133/87 (Device Time: 23:00:43)  Heart Rate: 80 (Device Time: 23:00:43)  Resp: 16 Edda Fowler RN   22:00 Other Flowsheet Documentation Other flowsheet entries  BP: 106/60 (Device Time: 22:00:50)  Heart Rate: 77 (Device Time: 22:00:50)  Resp: 16  (0-10) Pain Rating: Activity: 0  (0-10) Pain Rating: Rest: 0 Edda Fowler RN   21:08 Medication Given sodium chloride 0.9 % flush 10 mL - Dose: 10 mL ; Route: Intravenous ; Line: Peripheral IV 06/14/24 0905 Left Antecubital ; Scheduled Time: 2100 Edda Fowler RN   21:00 Other Flowsheet Documentation  Other flowsheet entries  BP: 133/93 (Device Time: 21:01:00)  Heart Rate: 97 (Device Time: 21:01:00)  Resp: 16  (0-10) Pain Rating: Activity: 0  (0-10) Pain Rating: Rest: 0 Edda Fowler RN   20:00 Other Flowsheet Documentation Other flowsheet entries  BP: 130/89 (Device Time: 20:00:45)  Heart Rate: 85 (Device Time: 20:00:45)  Resp: 16  (0-10) Pain Rating: Activity: 0  (0-10) Pain Rating: Rest: 0 Edda Fowler RN   19:10 Other Flowsheet Documentation Other flowsheet entries  BP: 116/64 (Device Time: 19:11:12)  Temp: 98.6 °F (37 °C)  Temp src: Oral  Heart Rate: 119 (Device Time: 19:11:12)  Resp: 16  (0-10) Pain  Rating: Activity: 0  (0-10) Pain Rating: Rest: 0 Edda Fowler RN   18:30 OB PCS Body System Other flowsheet entries  (0-10) Pain Rating: Activity: 0  (0-10) Pain Rating: Rest: 0 Edda Buenrostro RN   17:40 Vital Signs Other flowsheet entries  BP: 100/83 (Device Time: 17:39:33)  Heart Rate: 95 (Device Time: 17:39:33) Edda Buenrostro RN   17:30 OB PCS Body System Other flowsheet entries  (0-10) Pain Rating: Activity: 0  (0-10) Pain Rating: Rest: 0 Edda Buenrostro RN   17:01 Vital Signs Other flowsheet entries  BP: 103/54 (Device Time: 17:00:56)  Heart Rate: 87 (Device Time: 17:00:56) Edda Buenrostro RN   16:31 Vital Signs Other flowsheet entries  BP: 110/67 (Device Time: 16:30:51)  Temp: 97.4 °F (36.3 °C)  Temp src: Oral  Heart Rate: 87 (Device Time: 16:30:51)  Resp: 18 Edda Buenrostro RN   16:30 OB PCS Body System Other flowsheet entries  (0-10) Pain Rating: Activity: 0  (0-10) Pain Rating: Rest: 0 Edda Buenrostro RN   16:01 Vital Signs Other flowsheet entries  BP: 110/68 (Device Time: 16:00:51)  Heart Rate: 90 (Device Time: 16:00:51) Edda Buenrostro RN   15:31 Vital Signs Other flowsheet entries  BP: 121/70 (Device Time: 15:30:55)  Heart Rate: 87 (Device Time: 15:30:55) Edda Buenrostro RN   15:30 OB PCS Body System Other flowsheet entries  (0-10) Pain Rating: Activity: 0  (0-10) Pain Rating: Rest: 0 Edda Buenrostro RN   15:05:50 Orders Placed Nursing  - NST Low risk >24 weeks:  Monitor for 30 minutes BID (Antepartum) Jason Rodriguez MD   15:05:50 Orders Discontinued NST Low risk >24 weeks:  Monitor for 30 minutes BID (Antepartum) (06/14/24 6874) Jason Rodriguez MD   15:05:23 Orders Placed Nursing  - Place Sequential Compression Device; Maintain Sequential Compression Device; Vital Signs q 4 while awake; Intermittent Auscultation FOR LOW RISK PATIENTS - NST on Admission Along With Intermittent Auscultation of Fetal Heart Tones.; NST Low risk >24 weeks:  Monitor for 30 minutes BID (Antepartum); Antepartum  Patients  <24 Weeks - Document Fetal Heart Tones Daily and PRN.; Notify Provider (Specified); Notify Provider of Tachysystole (Per Hospital Algorithm); Notify Provider if Membranes Ruptured, Bleeding Greater Than 1 Pad Per Hour, Fetal Heart Tone Abnormality or Severe Pain; Weigh Patient Daily; Initiate Group Beta Strep (GBS) Prophylaxis Protocol, If Criteria Met; Position Change - For Intra-Uterine Resusitation for Hypertonus, HyperStimulation or Non-Reassuring Fetal Status; Saline Lock & Maintain IV Access  Medications  - sodium chloride 0.9 % flush 10 mL; sodium chloride 0.9 % flush 10 mL; sodium chloride 0.9 % infusion 40 mL; acetaminophen (TYLENOL) tablet 650 mg; calcium carbonate (TUMS) chewable tablet 500 mg (200 mg elemental); ondansetron ODT (ZOFRAN-ODT) disintegrating tablet 8 mg; ondansetron (ZOFRAN) injection 4 mg; docusate sodium (COLACE) capsule 100 mg; bisacodyl (DULCOLAX) suppository 10 mg  IV  - Insert Peripheral IV  Diet  - Diet: Regular/House; Fluid Consistency: Thin (IDDSI 0)  Code Status  - Code Status and Medical Interventions:  Admission  - Admit To Obstetrics Inpatient Jason Rodriguez MD   15:01 Vital Signs Other flowsheet entries  BP: 126/87 (Device Time: 15:00:56)  Heart Rate: 75 (Device Time: 15:00:56) Edda Buenrostro RN   15:00:47 Orders Placed Nursing  - NST q shift  [facility] Nursing Order (Specify) Jason Rodriguez MD   15:00 OB Triage/Labor Other flowsheet entries  Contraction Frequency (Minutes): Occasional Edda Buenrostro RN   14:32 OB Triage/Labor Other flowsheet entries  Response: See orders  Provider Name: Dr. Rodriguez  Notification Route: At bedside Edda Buenrostro RN   14:31 Vital Signs  Other flowsheet entries  BP: 149/90 (Device Time: 14:30:52)  Heart Rate: 83 (Device Time: 14:30:52) Edda Buenrostro RN   14:30 OB PCS Body System Other flowsheet entries  (0-10) Pain Rating: Activity: 0  (0-10) Pain Rating: Rest: 0 Edda Buenrostro RN   14:25 Vital Signs  Other flowsheet  entries  BP: 142/86 (Device Time: 14:25:21)  Heart Rate: 73 (Device Time: 14:25:21) Edda Buenrostro RN   14:22 Patient Transferred From: [facility] ; bed 1 -- To: L02 ; bed 1 Tresa Wilson RN   14:20 Medication Given labetalol (NORMODYNE) tablet 200 mg - Dose: 200 mg ; Route: Oral ; Scheduled Time: 1500 Edda Buenrostro RN   14:15 Vital Signs Other flowsheet entries  BP: 132/78 (Device Time: 14:15:17)  Heart Rate: 93 (Device Time: 14:15:17) Edda Buenrostro RN   14:03 OB PCS Body System Other flowsheet entries  Response: See orders  Provider Name: Swati LOZADA  Notification Route: Phone call Edda Buenrostro RN   14:02:53 Orders Placed Lab  - Protein, Urine, 24 Hour - Urine, Clean Catch Swati Gallo APRN   14:02:21 Orders Placed Medications  - labetalol (NORMODYNE) tablet 200 mg Swati Glalo APRN   14:00 Vital Signs Other flowsheet entries  BP: 117/77 (Device Time: 14:00:22)  Heart Rate: 81 (Device Time: 14:00:22) Edda Buenrostro RN   13:50:09 Specimens Collected Protein / Creatinine Ratio, Urine - Urine, Clean Catch  - ID: [id number] Type: Urine Edda Buenrostro RN   13:50 Vital Signs Other flowsheet entries  BP: 117/85 (Device Time: 13:50:20)  Heart Rate: 82 (Device Time: 13:50:20) Edda Buenrostro RN   13:30 Other Flowsheet Documentation Other flowsheet entries  (0-10) Pain Rating: Activity: 0  (0-10) Pain Rating: Rest: 0  Contraction Frequency (Minutes): Occasional Edda Buenrostro RN   13:15 Other Flowsheet Documentation  Other flowsheet entries  BP: 154/102 Abnormal  (Device Time: 13:15:29)  Heart Rate: 86 (Device Time: 13:15:29)  Response: Waiting for response  Provider Name: Swati LOZADA  Notification Route: Phone call Edda Buenrostro RN   13:01 Vital Signs  Other flowsheet entries  BP: 144/97 (Device Time: 13:00:53)  Heart Rate: 84 (Device Time: 13:00:53) Edda Buenrostro RN   12:59 Vital Signs  Other flowsheet entries  BP: 149/100 (Device Time: 12:59:23)  Heart Rate: 80 (Device Time:  "12:59:23) Edda Buenrostro RN   12:45 Vital Signs  Other flowsheet entries  BP: 156/108 Abnormal  (Device Time: 12:45:26)  Heart Rate: 76 (Device Time: 12:45:26) Edda Buenrostro RN   12:31 Vital Signs  Other flowsheet entries  BP: 140/87 (Device Time: 12:30:34)  Heart Rate: 88 (Device Time: 12:30:34) Edda Buenrostro RN   12:30 OB PCS Body System Other flowsheet entries  (0-10) Pain Rating: Activity: 0  (0-10) Pain Rating: Rest: 0 Edda Buenrostro RN   12:29 Vital Signs  Other flowsheet entries  BP: 140/80 (Device Time: 12:29:05)  Heart Rate: 87 (Device Time: 12:29:05) Edda Buenrostro RN   12:26 Vital Signs  Other flowsheet entries  BP: 138/92 (Device Time: 12:25:53)  Temp: 97.8 °F (36.6 °C)  Temp src: Oral  Heart Rate: 83 (Device Time: 12:25:53)  Resp: 16 Edda Buenrostro RN   12:24:50 Specimens Collected Comprehensive Metabolic Panel  - ID: [id number] Type: Blood  Uric Acid  - ID: [id number] Type: Blood  Lactate Dehydrogenase  - ID: [id number] Type: Blood  CBC Auto Differential  - ID: [id number] Type: Blood Graciela Chiu RN   12:24 Specimens Collected CBC & Differential  - ID: [id number] Type: Blood    12:23:16 Orders Placed Lab Panel  - CBC & Differential  Lab  - Comprehensive Metabolic Panel; Uric Acid; Lactate Dehydrogenase; Protein / Creatinine Ratio, Urine - Urine, Clean Catch CleoSwati L, APRN   12:23 Vitals and Screening Other flowsheet entries  Height: 162.6 cm (64\")  Weight: 104 kg (230 lb) Graciela Chiu RN   12:10 Patient Arrived in &D  [facility]   09:05 Peripheral IV 06/14/24 0905 Left Antecubital Placed Removal date: If not present on admission, USE TODAY'S DATE/Removal time: If not present on admission, USE TODAY'S TIME: 06/15/24 1100  Placement date: If unknown, DO NOT use \"Add Comment\" note/Placement time: If unknown, DO NOT use \"Add Comment\" note... Edda Fowler RN         "

## 2024-06-18 ENCOUNTER — ROUTINE PRENATAL (OUTPATIENT)
Age: 24
End: 2024-06-18
Payer: COMMERCIAL

## 2024-06-18 ENCOUNTER — PATIENT OUTREACH (OUTPATIENT)
Dept: LABOR AND DELIVERY | Facility: HOSPITAL | Age: 24
End: 2024-06-18
Payer: COMMERCIAL

## 2024-06-18 VITALS — SYSTOLIC BLOOD PRESSURE: 144 MMHG | BODY MASS INDEX: 38.96 KG/M2 | WEIGHT: 227 LBS | DIASTOLIC BLOOD PRESSURE: 98 MMHG

## 2024-06-18 DIAGNOSIS — Z3A.36 36 WEEKS GESTATION OF PREGNANCY: Primary | ICD-10-CM

## 2024-06-18 LAB
GLUCOSE UR STRIP-MCNC: NEGATIVE MG/DL
PROT UR STRIP-MCNC: ABNORMAL MG/DL

## 2024-06-18 PROCEDURE — 0502F SUBSEQUENT PRENATAL CARE: CPT | Performed by: OBSTETRICS & GYNECOLOGY

## 2024-06-18 RX ORDER — LABETALOL 200 MG/1
200 TABLET, FILM COATED ORAL 3 TIMES DAILY
Qty: 90 TABLET | Refills: 0 | Status: SHIPPED | OUTPATIENT
Start: 2024-06-18

## 2024-06-18 NOTE — OUTREACH NOTE
[facility]  Unable to Reach       Questions/Answers      Flowsheet Row Responses   Pending Outreach Prenatal Check-in   Call Attempt First   Outcome No answer/busy                Lorene Márquez RN  Maternity Nurse Navigator    6/18/2024, 10:59 CDT

## 2024-06-18 NOTE — PROGRESS NOTES
Having increased swelling. Denies HA, RUQ pain. 24 hour urine normal 6/15.  Having increase in ctx. GFM. BPP 8/8.    /98   Wt 103 kg (227 lb)   LMP 10/10/2023 (Exact Date)   BMI 38.96 kg/m²    FHTs 136  Urine protein 1+, urine glucose negative  Cervix 4/80/-2 soft    Diagnoses and all orders for this visit:    1. 36 weeks gestation of pregnancy (Primary)  -     POC Urinalysis Dipstick  -     Strep B Screen - , Vaginal/Rectum  -     Chlamydia trachomatis, Neisseria gonorrhoeae, PCR w/ confirmation - Swab, Vagina  IUP at 36 weeks gestation with CHTN.  BP has been elevated. 24 hour urine normal. Pt with ctx and pain and cervix 4 cm. To L&D to rule out labor and worsening CHTN.

## 2024-06-19 ENCOUNTER — PATIENT OUTREACH (OUTPATIENT)
Dept: LABOR AND DELIVERY | Facility: HOSPITAL | Age: 24
End: 2024-06-19
Payer: COMMERCIAL

## 2024-06-19 NOTE — OUTREACH NOTE
[facility]  Check-In    Current Estimated Gestational Age: 36w1d      Questions/Answers      Flowsheet Row Responses   Best Method for Contacting Cell   Currently Employed Yes   Able to keep appointments as scheduled Yes   Gender(s) and Name(s) female   Baby Active/Feeling Fetal Movemen Yes   How are you presently feeling? Carmelita states she is 4 cm.  We reviewed labor precautions over the phone.   Special Considerations Birthing Ball, Peanut Ball   Supplies ready for baby Breast Pump, Car Seat, Clothing, Crib, Diapers, Feeding Supplies   Resource/Environmental Concerns None   Do you have any questions related to your care experience, your pregnancy, plans for delivery, any concerns, etc? No          Resource letter sent to Carmelita in [employer].     Lorene Márquez RN  Maternity Nurse Navigator    6/19/2024, 09:53 CDT

## 2024-06-21 ENCOUNTER — TELEPHONE (OUTPATIENT)
Age: 24
End: 2024-06-21
Payer: COMMERCIAL

## 2024-06-21 NOTE — TELEPHONE ENCOUNTER
Called patient to reschedule missed appointment from this morning. Patient did not answer. Left voicemail to return call.

## 2024-06-22 LAB
C TRACH RRNA SPEC QL NAA+PROBE: NEGATIVE
GP B STREP DNA SPEC QL NAA+PROBE: NEGATIVE
N GONORRHOEA RRNA SPEC QL NAA+PROBE: NEGATIVE

## 2024-06-25 ENCOUNTER — HOSPITAL ENCOUNTER (INPATIENT)
Facility: HOSPITAL | Age: 24
LOS: 2 days | Discharge: HOME | End: 2024-06-28
Attending: OBSTETRICS & GYNECOLOGY | Admitting: OBSTETRICS & GYNECOLOGY
Payer: COMMERCIAL

## 2024-06-25 PROCEDURE — G0463 HOSPITAL OUTPT CLINIC VISIT: HCPCS

## 2024-06-25 RX ORDER — LABETALOL 200 MG/1
200 TABLET, FILM COATED ORAL ONCE
Status: COMPLETED | OUTPATIENT
Start: 2024-06-25 | End: 2024-06-25

## 2024-06-25 RX ADMIN — LABETALOL HYDROCHLORIDE 200 MG: 200 TABLET, FILM COATED ORAL at 22:39

## 2024-06-26 ENCOUNTER — ANESTHESIA (OUTPATIENT)
Dept: LABOR AND DELIVERY | Facility: HOSPITAL | Age: 24
End: 2024-06-26
Payer: COMMERCIAL

## 2024-06-26 ENCOUNTER — ANESTHESIA EVENT (OUTPATIENT)
Dept: LABOR AND DELIVERY | Facility: HOSPITAL | Age: 24
End: 2024-06-26
Payer: COMMERCIAL

## 2024-06-26 LAB
ABO GROUP BLD: NORMAL
ALBUMIN SERPL-MCNC: 3.2 G/DL (ref 3.5–5.2)
ALBUMIN/GLOB SERPL: 1.1 G/DL
ALP SERPL-CCNC: 127 U/L (ref 39–117)
ALT SERPL W P-5'-P-CCNC: 7 U/L (ref 1–33)
ANION GAP SERPL CALCULATED.3IONS-SCNC: 10 MMOL/L (ref 5–15)
AST SERPL-CCNC: 12 U/L (ref 1–32)
BASOPHILS # BLD AUTO: 0.03 10*3/MM3 (ref 0–0.2)
BASOPHILS NFR BLD AUTO: 0.3 % (ref 0–1.5)
BILIRUB SERPL-MCNC: 0.5 MG/DL (ref 0–1.2)
BLD GP AB SCN SERPL QL: NEGATIVE
BUN SERPL-MCNC: 6 MG/DL (ref 6–20)
BUN/CREAT SERPL: 9.2 (ref 7–25)
CALCIUM SPEC-SCNC: 8.9 MG/DL (ref 8.6–10.5)
CHLORIDE SERPL-SCNC: 104 MMOL/L (ref 98–107)
CO2 SERPL-SCNC: 21 MMOL/L (ref 22–29)
CREAT SERPL-MCNC: 0.65 MG/DL (ref 0.57–1)
CREAT UR-MCNC: 250 MG/DL
DEPRECATED RDW RBC AUTO: 36.1 FL (ref 37–54)
EGFRCR SERPLBLD CKD-EPI 2021: 127.8 ML/MIN/1.73
EOSINOPHIL # BLD AUTO: 0.17 10*3/MM3 (ref 0–0.4)
EOSINOPHIL NFR BLD AUTO: 1.7 % (ref 0.3–6.2)
ERYTHROCYTE [DISTWIDTH] IN BLOOD BY AUTOMATED COUNT: 13.6 % (ref 12.3–15.4)
GLOBULIN UR ELPH-MCNC: 2.9 GM/DL
GLUCOSE SERPL-MCNC: 76 MG/DL (ref 65–99)
HCT VFR BLD AUTO: 30.5 % (ref 34–46.6)
HGB BLD-MCNC: 9.6 G/DL (ref 12–15.9)
IMM GRANULOCYTES # BLD AUTO: 0.12 10*3/MM3 (ref 0–0.05)
IMM GRANULOCYTES NFR BLD AUTO: 1.2 % (ref 0–0.5)
LDH SERPL-CCNC: 153 U/L (ref 135–214)
LYMPHOCYTES # BLD AUTO: 2.75 10*3/MM3 (ref 0.7–3.1)
LYMPHOCYTES NFR BLD AUTO: 28.2 % (ref 19.6–45.3)
MCH RBC QN AUTO: 23.5 PG (ref 26.6–33)
MCHC RBC AUTO-ENTMCNC: 31.5 G/DL (ref 31.5–35.7)
MCV RBC AUTO: 74.6 FL (ref 79–97)
MONOCYTES # BLD AUTO: 0.8 10*3/MM3 (ref 0.1–0.9)
MONOCYTES NFR BLD AUTO: 8.2 % (ref 5–12)
NEUTROPHILS NFR BLD AUTO: 5.88 10*3/MM3 (ref 1.7–7)
NEUTROPHILS NFR BLD AUTO: 60.4 % (ref 42.7–76)
PLATELET # BLD AUTO: 220 10*3/MM3 (ref 140–450)
PMV BLD AUTO: 12.6 FL (ref 6–12)
POTASSIUM SERPL-SCNC: 3.7 MMOL/L (ref 3.5–5.2)
PROT ?TM UR-MCNC: 50.1 MG/DL
PROT SERPL-MCNC: 6.1 G/DL (ref 6–8.5)
PROT/CREAT UR: 200.4 MG/G CREA (ref 0–200)
RBC # BLD AUTO: 4.09 10*6/MM3 (ref 3.77–5.28)
RH BLD: POSITIVE
SODIUM SERPL-SCNC: 135 MMOL/L (ref 136–145)
T PALLIDUM IGG SER QL: NORMAL
T&S EXPIRATION DATE: NORMAL
URATE SERPL-MCNC: 6 MG/DL (ref 2.4–5.7)
WBC NRBC COR # BLD AUTO: 9.75 10*3/MM3 (ref 3.4–10.8)

## 2024-06-26 PROCEDURE — 86900 BLOOD TYPING SEROLOGIC ABO: CPT | Performed by: OBSTETRICS & GYNECOLOGY

## 2024-06-26 PROCEDURE — S0260 H&P FOR SURGERY: HCPCS | Performed by: OBSTETRICS & GYNECOLOGY

## 2024-06-26 PROCEDURE — 84156 ASSAY OF PROTEIN URINE: CPT | Performed by: OBSTETRICS & GYNECOLOGY

## 2024-06-26 PROCEDURE — 25810000003 LACTATED RINGERS PER 1000 ML: Performed by: OBSTETRICS & GYNECOLOGY

## 2024-06-26 PROCEDURE — 36415 COLL VENOUS BLD VENIPUNCTURE: CPT | Performed by: OBSTETRICS & GYNECOLOGY

## 2024-06-26 PROCEDURE — 86901 BLOOD TYPING SEROLOGIC RH(D): CPT | Performed by: OBSTETRICS & GYNECOLOGY

## 2024-06-26 PROCEDURE — 83615 LACTATE (LD) (LDH) ENZYME: CPT | Performed by: OBSTETRICS & GYNECOLOGY

## 2024-06-26 PROCEDURE — 0HQ9XZZ REPAIR PERINEUM SKIN, EXTERNAL APPROACH: ICD-10-PCS | Performed by: OBSTETRICS & GYNECOLOGY

## 2024-06-26 PROCEDURE — 80053 COMPREHEN METABOLIC PANEL: CPT | Performed by: OBSTETRICS & GYNECOLOGY

## 2024-06-26 PROCEDURE — 25010000002 ONDANSETRON PER 1 MG: Performed by: OBSTETRICS & GYNECOLOGY

## 2024-06-26 PROCEDURE — 82570 ASSAY OF URINE CREATININE: CPT | Performed by: OBSTETRICS & GYNECOLOGY

## 2024-06-26 PROCEDURE — 3E033VJ INTRODUCTION OF OTHER HORMONE INTO PERIPHERAL VEIN, PERCUTANEOUS APPROACH: ICD-10-PCS | Performed by: OBSTETRICS & GYNECOLOGY

## 2024-06-26 PROCEDURE — C1755 CATHETER, INTRASPINAL: HCPCS | Performed by: NURSE ANESTHETIST, CERTIFIED REGISTERED

## 2024-06-26 PROCEDURE — 85025 COMPLETE CBC W/AUTO DIFF WBC: CPT | Performed by: OBSTETRICS & GYNECOLOGY

## 2024-06-26 PROCEDURE — 84550 ASSAY OF BLOOD/URIC ACID: CPT | Performed by: OBSTETRICS & GYNECOLOGY

## 2024-06-26 PROCEDURE — 88307 TISSUE EXAM BY PATHOLOGIST: CPT | Performed by: OBSTETRICS & GYNECOLOGY

## 2024-06-26 PROCEDURE — 86780 TREPONEMA PALLIDUM: CPT | Performed by: OBSTETRICS & GYNECOLOGY

## 2024-06-26 PROCEDURE — 59400 OBSTETRICAL CARE: CPT | Performed by: OBSTETRICS & GYNECOLOGY

## 2024-06-26 PROCEDURE — 86850 RBC ANTIBODY SCREEN: CPT | Performed by: OBSTETRICS & GYNECOLOGY

## 2024-06-26 PROCEDURE — 10907ZC DRAINAGE OF AMNIOTIC FLUID, THERAPEUTIC FROM PRODUCTS OF CONCEPTION, VIA NATURAL OR ARTIFICIAL OPENING: ICD-10-PCS | Performed by: OBSTETRICS & GYNECOLOGY

## 2024-06-26 PROCEDURE — 25010000002 MORPHINE PER 10 MG: Performed by: OBSTETRICS & GYNECOLOGY

## 2024-06-26 RX ORDER — LABETALOL 200 MG/1
200 TABLET, FILM COATED ORAL EVERY 8 HOURS SCHEDULED
Status: DISCONTINUED | OUTPATIENT
Start: 2024-06-26 | End: 2024-06-26

## 2024-06-26 RX ORDER — MISOPROSTOL 200 UG/1
800 TABLET ORAL ONCE AS NEEDED
Status: DISCONTINUED | OUTPATIENT
Start: 2024-06-26 | End: 2024-06-26 | Stop reason: HOSPADM

## 2024-06-26 RX ORDER — MORPHINE SULFATE 2 MG/ML
1 INJECTION, SOLUTION INTRAMUSCULAR; INTRAVENOUS EVERY 4 HOURS PRN
Status: DISCONTINUED | OUTPATIENT
Start: 2024-06-26 | End: 2024-06-28 | Stop reason: ALTCHOICE

## 2024-06-26 RX ORDER — METHYLERGONOVINE MALEATE 0.2 MG/ML
200 INJECTION INTRAVENOUS ONCE AS NEEDED
Status: DISCONTINUED | OUTPATIENT
Start: 2024-06-26 | End: 2024-06-26 | Stop reason: HOSPADM

## 2024-06-26 RX ORDER — LIDOCAINE HYDROCHLORIDE 20 MG/ML
INJECTION, SOLUTION INFILTRATION; PERINEURAL
Status: COMPLETED
Start: 2024-06-26 | End: 2024-06-26

## 2024-06-26 RX ORDER — METHYLERGONOVINE MALEATE 0.2 MG/ML
200 INJECTION INTRAVENOUS ONCE AS NEEDED
Status: DISCONTINUED | OUTPATIENT
Start: 2024-06-26 | End: 2024-06-28 | Stop reason: HOSPADM

## 2024-06-26 RX ORDER — BISACODYL 10 MG
10 SUPPOSITORY, RECTAL RECTAL DAILY PRN
Status: DISCONTINUED | OUTPATIENT
Start: 2024-06-27 | End: 2024-06-28 | Stop reason: HOSPADM

## 2024-06-26 RX ORDER — OXYTOCIN/0.9 % SODIUM CHLORIDE 30/500 ML
2-20 PLASTIC BAG, INJECTION (ML) INTRAVENOUS
Status: DISCONTINUED | OUTPATIENT
Start: 2024-06-26 | End: 2024-06-26 | Stop reason: HOSPADM

## 2024-06-26 RX ORDER — MISOPROSTOL 200 UG/1
600 TABLET ORAL ONCE AS NEEDED
Status: DISCONTINUED | OUTPATIENT
Start: 2024-06-26 | End: 2024-06-28 | Stop reason: HOSPADM

## 2024-06-26 RX ORDER — SODIUM CHLORIDE 9 MG/ML
40 INJECTION, SOLUTION INTRAVENOUS AS NEEDED
Status: DISCONTINUED | OUTPATIENT
Start: 2024-06-26 | End: 2024-06-26 | Stop reason: HOSPADM

## 2024-06-26 RX ORDER — OXYTOCIN/0.9 % SODIUM CHLORIDE 30/500 ML
250 PLASTIC BAG, INJECTION (ML) INTRAVENOUS CONTINUOUS
Status: ACTIVE | OUTPATIENT
Start: 2024-06-26 | End: 2024-06-26

## 2024-06-26 RX ORDER — ACETAMINOPHEN 325 MG/1
650 TABLET ORAL EVERY 6 HOURS PRN
Status: DISCONTINUED | OUTPATIENT
Start: 2024-06-26 | End: 2024-06-28 | Stop reason: HOSPADM

## 2024-06-26 RX ORDER — MORPHINE SULFATE 10 MG/ML
10 INJECTION INTRAMUSCULAR; INTRAVENOUS; SUBCUTANEOUS
Status: DISCONTINUED | OUTPATIENT
Start: 2024-06-26 | End: 2024-06-26 | Stop reason: HOSPADM

## 2024-06-26 RX ORDER — ONDANSETRON 4 MG/1
4 TABLET, ORALLY DISINTEGRATING ORAL EVERY 6 HOURS PRN
Status: DISCONTINUED | OUTPATIENT
Start: 2024-06-26 | End: 2024-06-26 | Stop reason: HOSPADM

## 2024-06-26 RX ORDER — EPHEDRINE SULFATE 50 MG/ML
10 INJECTION, SOLUTION INTRAVENOUS
Status: DISCONTINUED | OUTPATIENT
Start: 2024-06-26 | End: 2024-06-26 | Stop reason: HOSPADM

## 2024-06-26 RX ORDER — ONDANSETRON 2 MG/ML
4 INJECTION INTRAMUSCULAR; INTRAVENOUS EVERY 6 HOURS PRN
Status: DISCONTINUED | OUTPATIENT
Start: 2024-06-26 | End: 2024-06-26 | Stop reason: HOSPADM

## 2024-06-26 RX ORDER — SODIUM CHLORIDE 0.9 % (FLUSH) 0.9 %
1-10 SYRINGE (ML) INJECTION AS NEEDED
Status: DISCONTINUED | OUTPATIENT
Start: 2024-06-26 | End: 2024-06-28 | Stop reason: HOSPADM

## 2024-06-26 RX ORDER — SODIUM CHLORIDE 0.9 % (FLUSH) 0.9 %
10 SYRINGE (ML) INJECTION EVERY 12 HOURS SCHEDULED
Status: DISCONTINUED | OUTPATIENT
Start: 2024-06-26 | End: 2024-06-26 | Stop reason: HOSPADM

## 2024-06-26 RX ORDER — CARBOPROST TROMETHAMINE 250 UG/ML
250 INJECTION, SOLUTION INTRAMUSCULAR
Status: DISCONTINUED | OUTPATIENT
Start: 2024-06-26 | End: 2024-06-26 | Stop reason: HOSPADM

## 2024-06-26 RX ORDER — IBUPROFEN 600 MG/1
600 TABLET ORAL EVERY 6 HOURS PRN
Status: DISCONTINUED | OUTPATIENT
Start: 2024-06-26 | End: 2024-06-28 | Stop reason: HOSPADM

## 2024-06-26 RX ORDER — HYDROXYZINE HYDROCHLORIDE 25 MG/1
50 TABLET, FILM COATED ORAL NIGHTLY PRN
Status: DISCONTINUED | OUTPATIENT
Start: 2024-06-26 | End: 2024-06-28 | Stop reason: HOSPADM

## 2024-06-26 RX ORDER — LABETALOL 200 MG/1
200 TABLET, FILM COATED ORAL EVERY 8 HOURS SCHEDULED
Status: DISCONTINUED | OUTPATIENT
Start: 2024-06-26 | End: 2024-06-28 | Stop reason: HOSPADM

## 2024-06-26 RX ORDER — DOCUSATE SODIUM 100 MG/1
100 CAPSULE, LIQUID FILLED ORAL 2 TIMES DAILY
Status: DISCONTINUED | OUTPATIENT
Start: 2024-06-26 | End: 2024-06-28 | Stop reason: HOSPADM

## 2024-06-26 RX ORDER — SODIUM CHLORIDE 0.9 % (FLUSH) 0.9 %
10 SYRINGE (ML) INJECTION AS NEEDED
Status: DISCONTINUED | OUTPATIENT
Start: 2024-06-26 | End: 2024-06-26 | Stop reason: HOSPADM

## 2024-06-26 RX ORDER — PRENATAL VIT/IRON FUM/FOLIC AC 27MG-0.8MG
1 TABLET ORAL DAILY
Status: DISCONTINUED | OUTPATIENT
Start: 2024-06-26 | End: 2024-06-28 | Stop reason: HOSPADM

## 2024-06-26 RX ORDER — OXYTOCIN/0.9 % SODIUM CHLORIDE 30/500 ML
125 PLASTIC BAG, INJECTION (ML) INTRAVENOUS ONCE AS NEEDED
Status: DISCONTINUED | OUTPATIENT
Start: 2024-06-26 | End: 2024-06-28 | Stop reason: HOSPADM

## 2024-06-26 RX ORDER — ACETAMINOPHEN 325 MG/1
650 TABLET ORAL EVERY 4 HOURS PRN
Status: DISCONTINUED | OUTPATIENT
Start: 2024-06-26 | End: 2024-06-26 | Stop reason: HOSPADM

## 2024-06-26 RX ORDER — SODIUM CHLORIDE, SODIUM LACTATE, POTASSIUM CHLORIDE, CALCIUM CHLORIDE 600; 310; 30; 20 MG/100ML; MG/100ML; MG/100ML; MG/100ML
125 INJECTION, SOLUTION INTRAVENOUS CONTINUOUS
Status: DISCONTINUED | OUTPATIENT
Start: 2024-06-26 | End: 2024-06-28 | Stop reason: HOSPADM

## 2024-06-26 RX ORDER — HYDROCORTISONE 25 MG/G
1 CREAM TOPICAL AS NEEDED
Status: DISCONTINUED | OUTPATIENT
Start: 2024-06-26 | End: 2024-06-28 | Stop reason: HOSPADM

## 2024-06-26 RX ORDER — ONDANSETRON 2 MG/ML
4 INJECTION INTRAMUSCULAR; INTRAVENOUS EVERY 6 HOURS PRN
Status: DISCONTINUED | OUTPATIENT
Start: 2024-06-26 | End: 2024-06-28 | Stop reason: HOSPADM

## 2024-06-26 RX ORDER — CALCIUM CARBONATE 500 MG/1
2 TABLET, CHEWABLE ORAL 3 TIMES DAILY PRN
Status: DISCONTINUED | OUTPATIENT
Start: 2024-06-26 | End: 2024-06-28 | Stop reason: HOSPADM

## 2024-06-26 RX ORDER — LIDOCAINE HYDROCHLORIDE 20 MG/ML
20 INJECTION, SOLUTION INFILTRATION; PERINEURAL ONCE
Status: COMPLETED | OUTPATIENT
Start: 2024-06-26 | End: 2024-06-26

## 2024-06-26 RX ORDER — LABETALOL 200 MG/1
200 TABLET, FILM COATED ORAL EVERY 8 HOURS SCHEDULED
Status: DISCONTINUED | OUTPATIENT
Start: 2024-06-26 | End: 2024-06-26 | Stop reason: SDUPTHER

## 2024-06-26 RX ORDER — PROMETHAZINE HYDROCHLORIDE 25 MG/1
25 TABLET ORAL EVERY 6 HOURS PRN
Status: DISCONTINUED | OUTPATIENT
Start: 2024-06-26 | End: 2024-06-28 | Stop reason: HOSPADM

## 2024-06-26 RX ORDER — OXYTOCIN/0.9 % SODIUM CHLORIDE 30/500 ML
999 PLASTIC BAG, INJECTION (ML) INTRAVENOUS ONCE
Qty: 500 ML | Refills: 0 | Status: DISCONTINUED | OUTPATIENT
Start: 2024-06-26 | End: 2024-06-26 | Stop reason: HOSPADM

## 2024-06-26 RX ORDER — NALOXONE HCL 0.4 MG/ML
0.4 VIAL (ML) INJECTION
Status: DISCONTINUED | OUTPATIENT
Start: 2024-06-26 | End: 2024-06-28 | Stop reason: ALTCHOICE

## 2024-06-26 RX ORDER — TRAMADOL HYDROCHLORIDE 50 MG/1
50 TABLET ORAL EVERY 6 HOURS PRN
Status: DISCONTINUED | OUTPATIENT
Start: 2024-06-26 | End: 2024-06-28 | Stop reason: HOSPADM

## 2024-06-26 RX ORDER — ONDANSETRON 4 MG/1
4 TABLET, ORALLY DISINTEGRATING ORAL EVERY 8 HOURS PRN
Status: DISCONTINUED | OUTPATIENT
Start: 2024-06-26 | End: 2024-06-28 | Stop reason: HOSPADM

## 2024-06-26 RX ORDER — PROMETHAZINE HYDROCHLORIDE 12.5 MG/1
12.5 SUPPOSITORY RECTAL EVERY 6 HOURS PRN
Status: DISCONTINUED | OUTPATIENT
Start: 2024-06-26 | End: 2024-06-28 | Stop reason: HOSPADM

## 2024-06-26 RX ADMIN — IBUPROFEN 600 MG: 600 TABLET, FILM COATED ORAL at 18:12

## 2024-06-26 RX ADMIN — Medication 250 ML/HR: at 09:01

## 2024-06-26 RX ADMIN — LABETALOL HYDROCHLORIDE 200 MG: 200 TABLET, FILM COATED ORAL at 05:33

## 2024-06-26 RX ADMIN — PRENATAL VIT W/ FE FUMARATE-FA TAB 27-0.8 MG 1 TABLET: 27-0.8 TAB at 18:13

## 2024-06-26 RX ADMIN — ONDANSETRON 4 MG: 2 INJECTION INTRAMUSCULAR; INTRAVENOUS at 02:09

## 2024-06-26 RX ADMIN — Medication 2 MILLI-UNITS/MIN: at 04:00

## 2024-06-26 RX ADMIN — LABETALOL HYDROCHLORIDE 200 MG: 200 TABLET, FILM COATED ORAL at 12:12

## 2024-06-26 RX ADMIN — SODIUM CHLORIDE, POTASSIUM CHLORIDE, SODIUM LACTATE AND CALCIUM CHLORIDE 999 ML/HR: 600; 310; 30; 20 INJECTION, SOLUTION INTRAVENOUS at 07:56

## 2024-06-26 RX ADMIN — MORPHINE SULFATE 1 MG: 2 INJECTION, SOLUTION INTRAMUSCULAR; INTRAVENOUS at 10:02

## 2024-06-26 RX ADMIN — Medication: at 21:15

## 2024-06-26 RX ADMIN — SODIUM CHLORIDE, POTASSIUM CHLORIDE, SODIUM LACTATE AND CALCIUM CHLORIDE 125 ML/HR: 600; 310; 30; 20 INJECTION, SOLUTION INTRAVENOUS at 01:21

## 2024-06-26 RX ADMIN — Medication 10 ML: at 04:07

## 2024-06-26 RX ADMIN — DOCUSATE SODIUM 100 MG: 100 CAPSULE, LIQUID FILLED ORAL at 18:12

## 2024-06-26 RX ADMIN — LIDOCAINE HYDROCHLORIDE 20 ML: 20 INJECTION, SOLUTION INFILTRATION; PERINEURAL at 08:41

## 2024-06-26 RX ADMIN — LABETALOL HYDROCHLORIDE 200 MG: 200 TABLET, FILM COATED ORAL at 20:53

## 2024-06-26 NOTE — ANESTHESIA PROCEDURE NOTES
Labor Epidural      Patient reassessed immediately prior to procedure    Patient location during procedure: OB  Performed By  CRNA/CAA: Chinmay Encinas CRNA  Preanesthetic Checklist  Completed: patient identified, IV checked, site marked, risks and benefits discussed, surgical consent, monitors and equipment checked, pre-op evaluation and timeout performed  Additional Notes  X 1 ATTEMPT, KRUNAL WITH SALINE. Epidural inserted 4 cm and patient felt like she needed to push. Needle removed and patient instantly delivered.   Prep:  Pt Position:sitting  Sterile Tech:gloves, mask and sterile barrier  Prep:DuraPrep  Monitoring:blood pressure monitoring and continuous pulse oximetry  Epidural Block Procedure:  Approach:midline  Guidance:landmark technique and palpation technique  Location:L3-L4  Needle Type:Tuohy  Needle Gauge:17 G  Loss of Resistance Medium: saline  Loss of resistance: NA.  Paresthesia: none  Aspiration:negative  Test Dose:negative

## 2024-06-26 NOTE — PROGRESS NOTES
Tiptonjenna Powers  : 2000  MRN: [id number]  CSN: [id number]    Labor & Delivery EULALIA progress note    Subjective   Chief Complaint: She reports painful contractions    HPI: Increased contractions this evening. Known CHTN on meds, not controlled, however, hadn't taken labetalol since 9am. Records show tid dosing, patient states she takes it bid. BP not controlled    History:    Prenatal Information:  Prenatal Results       Initial Prenatal Labs       Test Value Reference Range Date Time    Hemoglobin ^ 10.4 g/dL  23     Hematocrit ^ 31 %  23     Platelets ^ 247 K/µL  23     Rubella IgG ^ 2.28   23     Hepatitis B SAg ^ Negative   23     Hepatitis C Ab ^ negative   23     RPR  Non Reactive  Non Reactive 24 1324      ^ Non-Reactive   23     T. Pallidum Ab         ABO ^ B   23     Rh ^ Positive   23     Antibody Screen ^ Normal  Normal 23     HIV ^ non-reactive   23     Urine Culture  25,000 CFU/mL Mixed Alysha Isolated   24 0514       <10,000 CFU/mL Normal Urogenital Alysha   24 0856      ^ See table below  No growth at 24 hours, No growth at 2 days, No growth at 3 days, No growth, Growth present, too young to evaluate, Culture in progress 23     Gonorrhea  Negative  Negative 24 1005       Negative  Negative 24 1318       Negative  Negative 24 1334      ^ positive   23     Chlamydia  Negative  Negative 24 1005       Negative  Negative 24 1318       Negative  Negative 24 1334      ^ positive   23     TSH        HgB A1c         Varicella IgG        Hemoglobinopathy Fractionation        Hemoglobinopathy (genetic testing)        Cystic fibrosis                   Fetal testing        Test Value Reference Range Date Time    NIPT        MSAFP        AFP-4                  2nd and 3rd Trimester       Test Value Reference Range Date Time    Hemoglobin (repeated)  9.8 g/dL  12.0 - 15.9 06/14/24 1224       10.0 g/dL 11.1 - 15.9 05/30/24 1240       9.9 g/dL 11.1 - 15.9 04/18/24 1324       10.3 g/dL 11.1 - 15.9 04/18/24 1324       10.9 g/dL 12.0 - 15.9 03/28/24 0747    Hematocrit (repeated)  31.3 % 34.0 - 46.6 06/14/24 1224       31.9 % 34.0 - 46.6 05/30/24 1240       31.3 % 34.0 - 46.6 04/18/24 1324       34.0 % 34.0 - 46.6 03/28/24 0747    Platelets   207 10*3/mm3 140 - 450 06/14/24 1224       254 x10E3/uL 150 - 450 05/30/24 1240       203 x10E3/uL 150 - 450 04/18/24 1324       227 10*3/mm3 140 - 450 03/28/24 0747      ^ 247 K/µL  12/18/23     1 hour GTT   109 mg/dL 70 - 139 04/18/24 1324    Antibody Screen (repeated)        3rd TM syphilis scrn (repeated)  RPR   Non Reactive  Non Reactive 04/18/24 1324    3rd TM syphilis scrn (repeated) FTA        GTT Fasting        GTT 1 Hr        GTT 2 Hr        GTT 3 Hr        Group B Strep  Negative  Negative 06/18/24 1005              Other testing        Test Value Reference Range Date Time    Parvo IgG         CMV IgG                   Drug Screening       Test Value Reference Range Date Time    Amphetamine Screen        Barbiturate Screen        Benzodiazepine Screen        Methadone Screen        Phencyclidine Screen        Opiates Screen        THC Screen        Cocaine Screen        Propoxyphene Screen        Buprenorphine Screen        Methamphetamine Screen        Oxycodone Screen        Tricyclic Antidepressants Screen                  Legend    ^: Historical                          External Prenatal Results       Pregnancy Outside Results - Transcribed From Office Records - See Scanned Records For Details       Test Value Date Time    ABO ^ B  12/18/23     Rh ^ Positive  12/18/23     Antibody Screen ^ Normal  12/18/23     Varicella IgG       Rubella ^ 2.28  12/18/23     Hgb  9.8 g/dL 06/14/24 1224       10.0 g/dL 05/30/24 1240       9.9 g/dL 04/18/24 1324       10.3 g/dL 04/18/24 1324       10.9 g/dL 03/28/24 0747      ^ 10.4 g/dL  23     Hct  31.3 % 24 1224       31.9 % 24 1240       31.3 % 24 1324       34.0 % 24 0747      ^ 31 % 23     HgB A1c        1h GTT  109 mg/dL 24 1324    3h GTT Fasting       3h GTT 1 hour       3h GTT 2 hour       3h GTT 3 hour        Gonorrhea (discrete)  Negative  24 1005       Negative  24 1318       Negative  24 1334      ^ positive  23     Chlamydia (discrete)  Negative  24 1005       Negative  24 1318       Negative  24 1334      ^ positive  23     RPR  Non Reactive  24 1324      ^ Non-Reactive  23     Syphilis Antibody       HBsAg ^ Negative  23     Herpes Simplex Virus PCR       Herpes Simplex VIrus Culture       HIV ^ non-reactive  23     Hep C RNA Quant PCR       Hep C Antibody ^ negative  23     AFP       NIPT       Cystic Fibroisis        Group B Strep  Negative  24 1005    GBS Susceptibility to Clindamycin       GBS Susceptibility to Erythromycin       Fetal Fibronectin       Genetic Testing, Maternal Blood                 Drug Screening       Test Value Date Time    Urine Drug Screen       Amphetamine Screen       Barbiturate Screen       Benzodiazepine Screen       Methadone Screen       Phencyclidine Screen       Opiates Screen       THC Screen       Cocaine Screen       Propoxyphene Screen       Buprenorphine Screen       Methamphetamine Screen       Oxycodone Screen       Tricyclic Antidepressants Screen                 Legend    ^: Historical                             Past OB History:     OB History    Para Term  AB Living   2 1 0 1 0 1   SAB IAB Ectopic Molar Multiple Live Births   0 0 0 0 0 0      # Outcome Date GA Lbr Marco A/2nd Weight Sex Type Anes PTL Lv   2 Current            1  22 34w0d  2863 g (6 lb 5 oz) M Vag-Spont         Complications: Pre-eclampsia       Past Medical History: Past Medical History:   Diagnosis Date    Migraine 24     I get migraines really bad all throughout the day and night      Past Surgical History Past Surgical History:   Procedure Laterality Date    TONSILLECTOMY        Family History: Family History   Problem Relation Age of Onset    Breast cancer Neg Hx     Ovarian cancer Neg Hx     Uterine cancer Neg Hx     Colon cancer Neg Hx     Melanoma Neg Hx       Social History:  reports that she has never smoked. She has never used smokeless tobacco.   reports that she does not currently use alcohol.   reports no history of drug use.           High Risk Medical Conditions/Complaints this visit: CHTN on meds, uncontrolled    Discussion of Management or Test Interpretation with physcian/provider/healthcare provider: Yes, Dr. Key    External Records Reviewed: Prenatal history in [facility] from [facility] [place] [facility] provider - ANNALEE Ocampo ultrasound reviewed, pregnancy complicated by: chronic hypertension uncontrolled on meds, morbid obesity     Review Previous Test Results: Prenatal labs in [facility] reviewed, history of: chlamydia/gonorrhea in this pregnancy and anemia    Independent Historian: none    Social Determinants to Health: No drug, transportation or housing or other issues noted       Objective   Medical Decision Making:  Amount/Complexity of Data Reviewed  -Labs ordered - PIH  -Imaging ordered - no  -IV fluids given - no  Risk:  Prescription drug management - labetalol  Drug therapy requiring intensive monitoring for toxicity - no  Decision to admit patient  Diagnosis/Treatment limited by social determinants    Min/max vitals past 24 hours:  Temp  Min: 98.5 °F (36.9 °C)  Max: 98.5 °F (36.9 °C)   BP  Min: 138/84  Max: 157/92   Pulse  Min: 67  Max: 81   Resp  Min: 17  Max: 17        Independent Interpretation NST/FHT's: reassuring and category 1.  external monitors used   Cervix: was checked (by RN): 4 cm / 80 % / -1   Contractions:    Review of current test: results regular    N/a       Final Diagnoses: Chronic hypertension on meds,  uncontrolled       Assessment   IUP at 37w1d  CHTN on meds, uncontrolled with favorable cervix     Plan   Admit for delivery    Regina Reaves MD  6/26/2024  00:08 CDT

## 2024-06-26 NOTE — H&P
[facility]  Carmelita Powers  : 2000  MRN: [id number]  CSN: [id number]    History and Physical    Subjective   Carmelita Powers is a 24 y.o. year old  with an Estimated Date of Delivery: 24 presented tonight with irregular contractions.  No loss of fluid or vaginal bleeding.  Patient has chronic hypertension on medication, and her labetalol has been increased recently from twice daily to 3 times daily.  Despite this, blood pressure has been poorly controlled.  BP here has been 140/101, 146/96, 141/105, 156/97, 154/97, 155/95, and 142/100.  Patient specifically denies headache, blurry vision, or right upper quadrant pain.  She has been having nausea when her heartburn is really bad, but that is not new.  Patient is going to be kept for induction of labor due to  CHTN poorly-controlled on medication .  Prenatal care has been with Dr. Ally Ocampo and Jason Rodriguez.     OB History    Para Term  AB Living   2 1 0 1 0 1   SAB IAB Ectopic Molar Multiple Live Births   0 0 0 0 0 0      # Outcome Date GA Lbr Marco A/2nd Weight Sex Type Anes PTL Lv   2 Current            1  22 34w0d  2863 g (6 lb 5 oz) M Vag-Spont         Complications: Pre-eclampsia     Past Medical History:   Diagnosis Date    Migraine 24    I get migraines really bad all throughout the day and night     Past Surgical History:   Procedure Laterality Date    TONSILLECTOMY       No current facility-administered medications for this encounter.    No Known Allergies  Social History    Tobacco Use      Smoking status: Never      Smokeless tobacco: Never    Review of Systems   Constitutional:  Positive for activity change. Negative for unexpected weight change.   Respiratory:  Negative for shortness of breath.    Cardiovascular:  Negative for chest pain.   Gastrointestinal:  Positive for abdominal pain (with contractions, but no pain in-between).   Genitourinary:  Negative for vaginal bleeding and vaginal  discharge.         Objective   /100   Pulse 76   Temp 98.5 °F (36.9 °C) (Oral)   Resp 17   LMP 10/10/2023 (Exact Date)   SpO2 100%   Breastfeeding No   General: well developed; well nourished  no acute distress   Heart: Not performed.   Lungs: breathing is unlabored   Abdomen:  Cervix:  Presentation:  EFW by Leopold's:  EFW by recent u/s: soft, non-tender; no masses  fundus firm and non-tender  was checked (by RN): 4 cm / 80 % / -2  Vertex         Prenatal Labs  Lab Results   Component Value Date    HGB 9.8 (L) 2024    HEPBSAG Negative 2023    ABO B 2023    RH Positive 2023    ABSCRN Normal 2023    DWD9YGO2 non-reactive 2023    HEPCVIRUSABY negative 2023    URINECX 25,000 CFU/mL Mixed Alysha Isolated 2024       Recent Labs  Lab Results   Component Value Date    HGB 9.8 (L) 2024    HCT 31.3 (L) 2024    WBC 8.27 2024     2024           Assessment   IUP with an Estimated Date of Delivery: 24  Induction of labor for  CHTN poorly-controlled with meds .  The patient's pelvis feels clinically adequate for IOL to be appropriate, although she understands that this clinical judgement is not always accurate.  Group B Strep status: negative  Labs were just drawn         Plan   Risks and benefits of induction discussed.  Patient understands that IOL increases the risk of  delivery over spontaneous labor, especially if the patient does not have a favorable cervix.  Will start pitocin for IOL      Yesica Key MD  2024

## 2024-06-26 NOTE — NURSING NOTE
Pt arrives to L&D with c/o constant low abdominal pain . Denies ROM or vaginal bleeding. States that her last cervical exam was 4 cm in the office. States she has an appt with her OB tomorrow. States she takes labetalol BID with her last dose being at 0900 this am. EFM applied to soft and non tender abdomen.

## 2024-06-26 NOTE — PLAN OF CARE
Goal Outcome Evaluation:              Outcome Evaluation:  37w1d. IoL for CHTN. Delivered vag spontaneous at 0836 no complications, 1st degree laceration- repaired. FF, midline, UU, light bleeding. BP elevated- MD notified. other VSS, transferring to .

## 2024-06-26 NOTE — L&D DELIVERY NOTE
OB Placenta Delivery Note    Carmelita  24 y.o.     Review the Delivery Report for details.     Gestational Age: 37w1d  /Para:   Labor Complications: Precipitous  Estimated Blood Loss: 50cc  Delivery Type: Vaginal, Spontaneous   ROM to Delivery Time: 0h 46m   Sex: Female   Weight: 2970 g (6 lb 8.8 oz)   1 Minute 5 Minute 10 Minute  Apgar Totals: 8  9       Delivery Details:  jhonathan Mae 24 y.o.  female precipitously delivered a viable  on 2024 at 0836. I was present on the L&D floor when I was called by RN to the room immediately following delivery. Patient had been sitting up for an epidural and had the sudden urge to push.  Upon my arrival, baby was delivered and being cared for by nursing staff. Cord was doubly clamped and cut with three-vessels noted. Cord blood was obtained in routine fashion and sent to lab.  No cord complications. Placenta delivered at 2024  8:38 AM . Placental disposition was lab  pathology . Fundal massage performed and fundus found to be firm. Perineum, vagina, cervix were inspected, and laceration was noted. At this time Dr. Ocampo arrived and completed the laceration repair.     Jacky Babin DO

## 2024-06-26 NOTE — PROGRESS NOTES
Ally Ocampo MD  [facility]  [address]  [place] [address]  Office [phone]  Fax [phone]      [facility]  Carmelita Powers  : 2000  MRN: [id number]  CSN: [id number]    Labor progress note    Subjective   She reports is having no problems     Objective   Min/max vitals past 24 hours:  Temp  Min: 98 °F (36.7 °C)  Max: 98.5 °F (36.9 °C)   BP  Min: 121/86  Max: 162/92   Pulse  Min: 60  Max: 93   Resp  Min: 16  Max: 17        FHT's: reactive and category 1.  external monitors used   Cervix: was checked (by me): 5 cm / 80 % / -2, AROM with clear fluid   Contractions: regular every 4 minutes      Assessment   IUP at 37w1d  CHTN poorly controlled  Admitted for labor induction due to poorly controlled CHTN at 4 cm.        Plan   AROM - clear fluid seen  Allow labor to continue pending maternal and fetal status  Plan discussed with family and questions answered.  Understanding verbalized.    Ally Ocampo MD  2024  07:55 CDT

## 2024-06-26 NOTE — PLAN OF CARE
Goal Outcome Evaluation:              Outcome Evaluation: Pt being induced with pitocin for gestational hypertension. Cervical exan was 4/80/-1 at 0355. Labetalol po TID. Pt feels occasional mild contractions.

## 2024-06-26 NOTE — L&D DELIVERY NOTE
"[facility]  Vaginal Delivery Note  Review the Delivery Report for details.       Delivery     Delivery: Vaginal, Spontaneous     YOB: 2022    Time of Birth:  Gestational Age 8:36 AM   37w1d     Anesthesia: None     Delivering clinician: Jacky Babin    Forceps?   No   Vacuum? No    Shoulder dystocia present: No        Delivery narrative:  Pt feeling urge to push with epidural. Laborist, Dr. Babin come to the room as baby delivering. See his note for details.  Upon my arrival, baby was delivered and Dr. Babin was delivering the placenta.  Placenta SI/3VC.  Midline first degree laceration noted. I gowned and gloved and repaired the laceration with 3-0 caprosyn until 1% lidocaine anesthesia.  Anesthesia during laceration repair: 1% lidocaine. Good hemostasis was confirmed. No lacerations were noted to the cervix, perineum, or vulva. The uterine fundus was firm at the end of the procedure.  Mom and baby tolerated the delivery well. All needle, sponge, and instrument counts were noted to be correct x2 at the end of the procedure.       Infant    Findings: female  infant     Infant observations: Weight: No birth weight on file.   Length:   in  Observations/Comments:        Apgars: 8  @ 1 minute /    9  @ 5 minutes   Infant Name: [facility]     Placenta, Cord, and Fluid    Placenta delivered  Spontaneous  at   6/26/2024  8:38 AM     Cord: 3 vessels  present.   Nuchal Cord?  no   Cord blood obtained: No    Cord gases obtained:  No    Cord gas results: Venous:  No results found for: \"PHCVEN\", \"BECVEN\"    Arterial:  No results found for: \"PHCART\", \"BECART\"     Repair    Episiotomy: None     No    Lacerations: Yes  Laceration Information  Laceration Repaired?   Perineal: 1st  Yes    Periurethral:       Labial:       Sulcus:       Vaginal:       Cervical:         Suture used for repair: 3-0 Caprosyn     Estimated Blood Loss:  100 cc             Quantitative Blood Loss:            "       Complications  none    Disposition  Mother to Mother Baby/Postpartum  in stable condition currently.  Baby to remains with mom  in stable condition currently.      Ally Ocampo MD  06/26/24  09:21 CDT

## 2024-06-26 NOTE — LACTATION NOTE
Mother desires to formula feed. Affirmed decision. Non-nursing mother's packet given and reviewed. Encourage applying compressive bra and to wear for 1-2 weeks. Discussed signs of milk, s/s/tx of engorgement and mastitis. Questions and concerns denied. Encouragement and support provided.     Suppression of Lactation for Non-Nursing Mothers handout    If you choose not to breastfeed, please let us know if you have any questions about whether yours was the right choice for you and your family.  While there are a very few conditions where breastfeeding is not recommended, most uses surrounding breastfeeding can be managed with proper support.  We are here to hep and support you no matter how you choose to feed your baby.    To suppress further lactation and prevent milk from coming in-- as much as possible:  **Wear a good fitting support bra without an under wire (sports bras are good)   **Wear bra continuously day and night for about 1-2 weeks  **Avoid any kind of breast stimulation such as rubbing, warmth or massage.  ** While showering, try to stand with your back to the water. The warm water will     encourage milk flow.  **Cold compression may be applied for 20 minutes once per hour as needed.  **Anti-Inflammatory medications such as ibuprofen (Motrin) may help.  Ue per your doctors and/or manufacture instructions.  ** If you develop a fever greater than 101 F, especially if you also have flu- like symptoms and any areas of redness or swelling in your breasts, please call your physician. You may need treatment for a condition called mastitis.      The Many Benefits if Breastfeeding   Breastfeeding saves time  *Breastfeeding allows you to calm or feed your baby immediately, which leads to a happier baby who cries less  *There is nothing to buy, prepare, or maintain.There is nothing to clean or sterilize.  Breastfeeding builds a mothers confidence  *She knows all her baby needs to thrive is her!  Breastfeeding saves  Money  *There is no formula to buy and healthier breast fed babies have less medical costs  Healthy Mom/Healthy baby  * babies get sick less often, and when they do they are usually sick less severely and for a shorter time  * babies have fewer ear infections  * babies have fewer allergies  *Mothers who breastfeed have a lower risk for cancer, osteoporosis, anemia, high blood pressure, obesity, and Type ll diabetes  *Mothers miss less work days with sick babies  Breast fed babies have a better dental health  * babies have better jaw development which requires lest orthodontic work  *Breast milk does not promote cavities  * babies can nurse at night without worry of tooth decay  Breastfeeding allows a baby to reach his full IQ potential  *The longer a baby is breast fed, the better their brain development  Breast fed babies and moms are more relaxed  *The hormones released during breastfeeding have a calming effect on mothers  *Breastfeeding requires mom to take a break; this may help mom get more rest after delivery  *Breastfeeding is quicker than preparing formula which allows mom and baby to get back to sleep faster  *Breastfeeding promotes bonding and allows mom to learn babies cues and care needs more quickly  Breastfeeding cleanup is easier  *The bowel movements and spit up of breast fed babies doesn't smell as bad  *Spit-up of breast fed babies doesn't stain clothing  Getting out of the hourse is easier  *No formula bottles to prepare and carry safely   *No time restraints due to worry about what baby will eat  *No worries about warming a bottle or finding safe water to prepare bottles  Breastfeeding mother get their bodies back sooner  *The uterus shrinks more quickly and completely, which allows a flatter tummy  *Breastfeeding burns 400-500 calories a day; making milk torches stored fat!  Breastfeeding is better for the environment  *There is no trash to dispose  of after breastfeeding  *There is no production facility to produce breast milk; moms body does it all without the pollution of a factory      Your Guide to formula feeding your baby by [employer], www.[employer]

## 2024-06-26 NOTE — ANESTHESIA PREPROCEDURE EVALUATION
Anesthesia Evaluation     Patient summary reviewed   no history of anesthetic complications:   NPO Solid Status: > 8 hours             Airway   Dental      Pulmonary - negative pulmonary ROS   Cardiovascular   Exercise tolerance: excellent (>7 METS)    (+) hypertension well controlled less than 2 medications      Neuro/Psych  (+) headaches  GI/Hepatic/Renal/Endo    (+) obesity, GERD    Musculoskeletal     Abdominal    Substance History      OB/GYN    (+) Pregnant    Comment: Plt 220      Other                    Anesthesia Plan    ASA 2     epidural       Anesthetic plan, risks, benefits, and alternatives have been provided, discussed and informed consent has been obtained with: patient.    CODE STATUS:    Level Of Support Discussed With: Patient  Code Status (Patient has no pulse and is not breathing): CPR (Attempt to Resuscitate)  Medical Interventions (Patient has pulse or is breathing): Full Support

## 2024-06-27 LAB
BASOPHILS # BLD AUTO: 0.03 10*3/MM3 (ref 0–0.2)
BASOPHILS NFR BLD AUTO: 0.3 % (ref 0–1.5)
CYTO UR: NORMAL
DEPRECATED RDW RBC AUTO: 36.5 FL (ref 37–54)
EOSINOPHIL # BLD AUTO: 0.18 10*3/MM3 (ref 0–0.4)
EOSINOPHIL NFR BLD AUTO: 1.9 % (ref 0.3–6.2)
ERYTHROCYTE [DISTWIDTH] IN BLOOD BY AUTOMATED COUNT: 13.5 % (ref 12.3–15.4)
HCT VFR BLD AUTO: 28.4 % (ref 34–46.6)
HGB BLD-MCNC: 8.9 G/DL (ref 12–15.9)
IMM GRANULOCYTES # BLD AUTO: 0.04 10*3/MM3 (ref 0–0.05)
IMM GRANULOCYTES NFR BLD AUTO: 0.4 % (ref 0–0.5)
LAB AP CASE REPORT: NORMAL
LYMPHOCYTES # BLD AUTO: 2.23 10*3/MM3 (ref 0.7–3.1)
LYMPHOCYTES NFR BLD AUTO: 23.7 % (ref 19.6–45.3)
Lab: NORMAL
MCH RBC QN AUTO: 23.7 PG (ref 26.6–33)
MCHC RBC AUTO-ENTMCNC: 31.3 G/DL (ref 31.5–35.7)
MCV RBC AUTO: 75.7 FL (ref 79–97)
MONOCYTES # BLD AUTO: 0.82 10*3/MM3 (ref 0.1–0.9)
MONOCYTES NFR BLD AUTO: 8.7 % (ref 5–12)
NEUTROPHILS NFR BLD AUTO: 6.1 10*3/MM3 (ref 1.7–7)
NEUTROPHILS NFR BLD AUTO: 65 % (ref 42.7–76)
NRBC BLD AUTO-RTO: 0 /100 WBC (ref 0–0.2)
PATH REPORT.FINAL DX SPEC: NORMAL
PATH REPORT.GROSS SPEC: NORMAL
PLATELET # BLD AUTO: 205 10*3/MM3 (ref 140–450)
PMV BLD AUTO: 12.7 FL (ref 6–12)
RBC # BLD AUTO: 3.75 10*6/MM3 (ref 3.77–5.28)
WBC NRBC COR # BLD AUTO: 9.4 10*3/MM3 (ref 3.4–10.8)

## 2024-06-27 PROCEDURE — 85025 COMPLETE CBC W/AUTO DIFF WBC: CPT | Performed by: OBSTETRICS & GYNECOLOGY

## 2024-06-27 PROCEDURE — 0503F POSTPARTUM CARE VISIT: CPT | Performed by: ADVANCED PRACTICE MIDWIFE

## 2024-06-27 RX ORDER — FERROUS SULFATE 325(65) MG
325 TABLET ORAL
Status: DISCONTINUED | OUTPATIENT
Start: 2024-06-28 | End: 2024-06-28 | Stop reason: HOSPADM

## 2024-06-27 RX ADMIN — DOCUSATE SODIUM 100 MG: 100 CAPSULE, LIQUID FILLED ORAL at 09:15

## 2024-06-27 RX ADMIN — LABETALOL HYDROCHLORIDE 200 MG: 200 TABLET, FILM COATED ORAL at 12:25

## 2024-06-27 RX ADMIN — PRENATAL VIT W/ FE FUMARATE-FA TAB 27-0.8 MG 1 TABLET: 27-0.8 TAB at 09:15

## 2024-06-27 RX ADMIN — LABETALOL HYDROCHLORIDE 200 MG: 200 TABLET, FILM COATED ORAL at 21:18

## 2024-06-27 RX ADMIN — LABETALOL HYDROCHLORIDE 200 MG: 200 TABLET, FILM COATED ORAL at 06:30

## 2024-06-27 RX ADMIN — DOCUSATE SODIUM 100 MG: 100 CAPSULE, LIQUID FILLED ORAL at 21:18

## 2024-06-27 NOTE — PROGRESS NOTES
"      KIERRA Caballero  [facility]  [address]  [place] [address]  Office [phone]  Fax [phone]    [facility]  Vaginal Delivery Progress Note    Subjective   Postpartum Day 1: Vaginal Delivery    The patient feels well.  Her pain is well controlled with Motrin.   She is ambulating well.  Patient describes her bleeding as thin lochia. She denies visual disturbances, headache, or right upper quadrant pain    Breastfeeding: declines.    Objective     Vital Signs Range for the last 24 hours  Temperature: Temp:  [97.8 °F (36.6 °C)-98.4 °F (36.9 °C)] 98.3 °F (36.8 °C)   Temp Source: Temp src: Oral   BP: BP: (122-157)/() 146/87   Pulse: Heart Rate:  [67-89] 89   Respirations: Resp:  [16-18] 18   SPO2: SpO2:  [98 %-100 %] 98 %   O2 Amount (l/min):     O2 Devices Device (Oxygen Therapy): room air   Weight:       Admit Height:  Height: 162.6 cm (64\")      Physical Exam:  General:  no acute distresss.  Abdomen: abdomen is soft without significant tenderness, masses, organomegaly or guarding. Fundus: appropriate, firm, non tender  Extremities: normal, atraumatic, no cyanosis, and trace edema.       Lab results reviewed:  Yes   Rubella:  No results found for: \"RUBELLAIGGIN\" Nurse Transcribed from prenatal record --  No components found for: \"EXTRUBELQUAL\"  Rh Status:    RH type   Date Value Ref Range Status   06/26/2024 Positive  Final     Comment:     PRIOR ABORH RECORDS NOT AVAILABLE FOR VERIFICATION     Immunizations:   Immunization History   Administered Date(s) Administered    COVID-19 F23 (PFIZER) 12YRS+ (COMIRNATY) 10/18/2023    DTaP 2002, 03/03/2003, 09/04/2003, 06/22/2006    Fluzone (or Fluarix & Flulaval for VFC) >6mos 10/18/2023    Hep A, 2 Dose 03/02/2018, 10/18/2018    Hep B / HiB 2002, 2002, 05/15/2003    Hep B, Adolescent or Pediatric 2002, 2002, 05/15/2003    HiB 2002, 2002, 05/15/2003    Hpv9 03/02/2018, 10/18/2018    IPV 2002, " 2002, 05/15/2003, 06/22/2006    MMR 09/04/2003, 06/22/2006    Meningococcal MCV4P (Menactra) 10/18/2018    Meningococcal, Unspecified 08/04/2014, 10/18/2018    Pneumococcal Conjugate 13-Valent (PCV13) 2002, 03/03/2003, 09/04/2003    Tdap 08/04/2014, 05/01/2024    Varicella 05/15/2003, 08/04/2014     Lab Results (last 24 hours)       Procedure Component Value Units Date/Time    Tissue Pathology Exam [[id number]] Collected: 06/26/24 0901    Specimen: Tissue from Placenta Updated: 06/27/24 1151     Note to Patients --     This report may contain a detailed description of human tissue sent by a health care provider to the laboratory for pathologic evaluation. The content of this report is essential for diagnosis and may provide important critical findings. This information may be unfamiliar to patients to review without a medical professional present. It is advised that the patient review this report in the presence of a health care provider who can answer questions and explain the results.         Case Report --     Surgical Pathology Report                         Case: [id number]                                  Authorizing Provider:  Yesica Key MD         Collected:           06/26/2024 09:01 AM          Ordering Location:     [facility]     Received:            06/26/2024 03:25 PM                                 LABOR DELIVERY                                                               Pathologist:           Ari Greenfield MD                                                      Specimen:    Placenta                                                                                    Final Diagnosis --     Placenta, lua, spontaneous vaginal delivery:  Three-vessel umbilical cord.  Unremarkable fetal membranes.  Subserosal lamellar fibrin deposits.  Late third trimester chorionic villi.  Placenta weight of 527 g.       Gross Description --     1. Placenta.  Specimen #1 is  "received in formalin and labeled with the patient's name, medical record number, accession number and designated \"placenta\". The specimen consists of a round/oval lua placental disc with attached membranes and umbilical cord. The placental disc measures 16.1 x 15.4 x 3.8 cm. The membranes and umbilical cord are removed and the disc weighs 527 g. The maternal surface is red-brown and intact. Sectioning reveals red-brown, soft and spongy parenchyma with no fibrous deposition or intraparenchymal hemorrhage identified. The fetal surface is blue-gray with no significant discoloration. Subchorionic fibrin comprises less than 5% of the surface. The umbilical cord inserts eccentrically, 3.1 cm from the margin.  The cord measures 18.5 cm in length by 1.3 cm in diameter. The external surface is blue-gray with no significant discoloration. Sectioning reveals three patent vessels with no stricture or thrombus identified. The membranes insert at the margin and are tan-pink and translucent.    Representative section of peripheral disc is submitted in (block 1A). Representative section of central disc is submitted in (block 1B). Representative sections of cord and membranes are submitted in (block 1C).         Microscopic Description --     The three-vessel umbilical cord is unremarkable.  The fetal membranes are unremarkable.  The fetal surface shows subserosal lamellar fibrin deposits.  The sectioned maternal surface shows late third trimester chronic villi.      CBC & Differential [[id number]]  (Abnormal) Collected: 06/27/24 0532    Specimen: Blood Updated: 06/27/24 0552    Narrative:      The following orders were created for panel order CBC & Differential.  Procedure                               Abnormality         Status                     ---------                               -----------         ------                     CBC Auto Differential[[id number]]        Abnormal            Final result             "     Please view results for these tests on the individual orders.    CBC Auto Differential [[id number]]  (Abnormal) Collected: 06/27/24 0532    Specimen: Blood Updated: 06/27/24 0552     WBC 9.40 10*3/mm3      RBC 3.75 10*6/mm3      Hemoglobin 8.9 g/dL      Hematocrit 28.4 %      MCV 75.7 fL      MCH 23.7 pg      MCHC 31.3 g/dL      RDW 13.5 %      RDW-SD 36.5 fl      MPV 12.7 fL      Platelets 205 10*3/mm3      Neutrophil % 65.0 %      Lymphocyte % 23.7 %      Monocyte % 8.7 %      Eosinophil % 1.9 %      Basophil % 0.3 %      Immature Grans % 0.4 %      Neutrophils, Absolute 6.10 10*3/mm3      Lymphocytes, Absolute 2.23 10*3/mm3      Monocytes, Absolute 0.82 10*3/mm3      Eosinophils, Absolute 0.18 10*3/mm3      Basophils, Absolute 0.03 10*3/mm3      Immature Grans, Absolute 0.04 10*3/mm3      nRBC 0.0 /100 WBC             External Prenatal Results       Pregnancy Outside Results - Transcribed From Office Records - See Scanned Records For Details       Test Value Date Time    ABO  B  06/26/24 0018    Rh  Positive  06/26/24 0018    Antibody Screen  Negative  06/26/24 0018      ^ Normal  12/18/23     Varicella IgG       Rubella ^ 2.28  12/18/23     Hgb  8.9 g/dL 06/27/24 0532       9.6 g/dL 06/26/24 0018       9.8 g/dL 06/14/24 1224       10.0 g/dL 05/30/24 1240       9.9 g/dL 04/18/24 1324       10.3 g/dL 04/18/24 1324       10.9 g/dL 03/28/24 0747      ^ 10.4 g/dL 12/18/23     Hct  28.4 % 06/27/24 0532       30.5 % 06/26/24 0018       31.3 % 06/14/24 1224       31.9 % 05/30/24 1240       31.3 % 04/18/24 1324       34.0 % 03/28/24 0747      ^ 31 % 12/18/23     HgB A1c        1h GTT  109 mg/dL 04/18/24 1324    3h GTT Fasting       3h GTT 1 hour       3h GTT 2 hour       3h GTT 3 hour        Gonorrhea (discrete)  Negative  06/18/24 1005       Negative  06/14/24 1318       Negative  05/16/24 1334      ^ positive  11/30/23     Chlamydia (discrete)  Negative  06/18/24 1005       Negative  06/14/24 1318       Negative   05/16/24 1334      ^ positive  11/30/23     RPR  Non Reactive  04/18/24 1324      ^ Non-Reactive  12/18/23     Syphilis Antibody       HBsAg ^ Negative  12/18/23     Herpes Simplex Virus PCR       Herpes Simplex VIrus Culture       HIV ^ non-reactive  12/18/23     Hep C RNA Quant PCR       Hep C Antibody ^ negative  12/18/23     AFP       NIPT       Cystic Fibroisis        Group B Strep  Negative  06/18/24 1005    GBS Susceptibility to Clindamycin       GBS Susceptibility to Erythromycin       Fetal Fibronectin       Genetic Testing, Maternal Blood                 Drug Screening       Test Value Date Time    Urine Drug Screen       Amphetamine Screen       Barbiturate Screen       Benzodiazepine Screen       Methadone Screen       Phencyclidine Screen       Opiates Screen       THC Screen       Cocaine Screen       Propoxyphene Screen       Buprenorphine Screen       Methamphetamine Screen       Oxycodone Screen       Tricyclic Antidepressants Screen                 Legend    ^: Historical                            Assessment & Plan       Pregnancy      Carmelita Powers is Day 1  post-partum  Vaginal, Spontaneous   .      Plan:  Continue with current postpartum plan of care. Continue to monitor blood pressures. Discussed nonpharmacologic measures for discomfort, including a warm aqua k-pad. Discussed also iron supplementation.       This note has been signed electronically.    Swati Gallo, DNP, APRN, CNM, RNC-OB  6/27/2024  15:53 CDT

## 2024-06-27 NOTE — PLAN OF CARE
Goal Outcome Evaluation:  Plan of Care Reviewed With: patient, sibling        Progress: improving  Outcome Evaluation: VSS (bp controlled with Labetalol), FF/ML UU scant lochia no clots. Using comfort products for lac. Voiding WNL, ambulating in room. No c/o hadaches, no vision changes, mild edema, no clonus, reflexes WNL. PRN pain meds available but has not requested this shift.  Formula feeding and bonding with infant.

## 2024-06-27 NOTE — PLAN OF CARE
Problem: Adjustment to Role Transition (Postpartum Vaginal Delivery)  Goal: Successful Maternal Role Transition  Outcome: Ongoing, Progressing   Goal Outcome Evaluation:  Plan of Care Reviewed With: patient        Progress: improving  Outcome Evaluation: BP elevated at noon gave Labetalol early per NP.  Rest of BPs wnl.  Fundus fml UU scant lochia.  Not taking any pain medications.

## 2024-06-27 NOTE — ANESTHESIA POSTPROCEDURE EVALUATION
"Patient: Carmelita Powers    Procedure Summary       Date: 06/26/24 Room / Location:     Anesthesia Start:  Anesthesia Stop:     Procedure: LABOR ANALGESIA Diagnosis:     Scheduled Providers:  Provider:     Anesthesia Type: epidural ASA Status: 2            Anesthesia Type: epidural    Vitals  No vitals data found for the desired time range.          Post Anesthesia Care and Evaluation    Patient location during evaluation: bedside  Patient participation: complete - patient participated  Level of consciousness: awake  Pain management: adequate    Airway patency: patent  Anesthetic complications: No anesthetic complications  PONV Status: none  Cardiovascular status: acceptable  Respiratory status: acceptable  Hydration status: acceptable  Post Neuraxial Block status: Motor and sensory function returned to baseline and No signs or symptoms of PDPH  Comments: Blood pressure (!) 157/106, pulse 77, temperature 98.3 °F (36.8 °C), temperature source Oral, resp. rate 18, height 162.6 cm (64\"), weight 104 kg (230 lb), last menstrual period 10/10/2023, SpO2 98%, not currently breastfeeding.      "

## 2024-06-28 ENCOUNTER — PATIENT OUTREACH (OUTPATIENT)
Dept: LABOR AND DELIVERY | Facility: HOSPITAL | Age: 24
End: 2024-06-28
Payer: COMMERCIAL

## 2024-06-28 VITALS
OXYGEN SATURATION: 98 % | HEIGHT: 64 IN | WEIGHT: 230 LBS | BODY MASS INDEX: 39.27 KG/M2 | HEART RATE: 82 BPM | TEMPERATURE: 98.6 F | SYSTOLIC BLOOD PRESSURE: 140 MMHG | RESPIRATION RATE: 18 BRPM | DIASTOLIC BLOOD PRESSURE: 92 MMHG

## 2024-06-28 PROBLEM — Z34.90 PREGNANCY: Status: RESOLVED | Noted: 2024-02-21 | Resolved: 2024-06-28

## 2024-06-28 PROCEDURE — 0503F POSTPARTUM CARE VISIT: CPT | Performed by: ADVANCED PRACTICE MIDWIFE

## 2024-06-28 RX ORDER — HYDROCHLOROTHIAZIDE 25 MG/1
25 TABLET ORAL DAILY
Status: DISCONTINUED | OUTPATIENT
Start: 2024-06-28 | End: 2024-06-28 | Stop reason: HOSPADM

## 2024-06-28 RX ORDER — HYDROCHLOROTHIAZIDE 25 MG/1
25 TABLET ORAL DAILY
Qty: 30 TABLET | Refills: 1 | Status: SHIPPED | OUTPATIENT
Start: 2024-06-28

## 2024-06-28 RX ADMIN — PRENATAL VIT W/ FE FUMARATE-FA TAB 27-0.8 MG 1 TABLET: 27-0.8 TAB at 08:34

## 2024-06-28 RX ADMIN — HYDROCHLOROTHIAZIDE 25 MG: 25 TABLET ORAL at 08:34

## 2024-06-28 RX ADMIN — FERROUS SULFATE TAB 325 MG (65 MG ELEMENTAL FE) 325 MG: 325 (65 FE) TAB at 08:34

## 2024-06-28 RX ADMIN — LABETALOL HYDROCHLORIDE 200 MG: 200 TABLET, FILM COATED ORAL at 05:12

## 2024-06-28 RX ADMIN — DOCUSATE SODIUM 100 MG: 100 CAPSULE, LIQUID FILLED ORAL at 08:34

## 2024-06-28 NOTE — DISCHARGE SUMMARY
[facility]    Swati Gallo, APRN  [address]  [place] [address]  [phone]      Discharge Summary     Arielle Powers  : 2000  MRN: [id number]  CSN: [id number]    Date of Admission: 2024   Date of Discharge:  2024   Delivering Physician: Jacky Babin        Admission Diagnosis: Pregnancy [Z34.90]   Discharge Diagnosis: Pregnancy at 37w1d - delivered       Procedures: 2024  - Vaginal, Spontaneous       Hospital Course  Patient is a 24 y.o.  who at 37w1d had a vaginal birth.  Her postpartum course was without complications.  On PPD #2 she was ready for discharge.  She had normal lochia and pain well controlled with oral medications.    Infant  female  fetus weighing 2970 g (6 lb 8.8 oz)   Apgars -  8 @ 1 minute /  9 @ 5 minutes.    Discharge labs  Lab Results   Component Value Date    WBC 9.40 2024    HGB 8.9 (L) 2024    HCT 28.4 (L) 2024     2024       Discharge Medications     Discharge Medications        New Medications        Instructions Start Date   hydroCHLOROthiazide 25 MG tablet   25 mg, Oral, Daily             Changes to Medications        Instructions Start Date   ACCRUFeR 30 MG capsule  Generic drug: Ferric Maltol  What changed: Another medication with the same name was removed. Continue taking this medication, and follow the directions you see here.   30 mg, Oral, 2 Times Daily             Continue These Medications        Instructions Start Date   aspirin 81 MG EC tablet   81 mg, Oral, Daily      labetalol 200 MG tablet  Commonly known as: NORMODYNE   200 mg, Oral, 3 times daily      metoclopramide 10 MG tablet  Commonly known as: Reglan   10 mg, Oral, 4 Times Daily      omeprazole 20 MG capsule  Commonly known as: priLOSEC   20 mg, Oral, Daily      ondansetron ODT 4 MG disintegrating tablet  Commonly known as: ZOFRAN-ODT   4 mg, Translingual, Every 8 Hours PRN      PNV Prenatal Plus  Multivit+DHA 27-1 & 312 MG misc   1 tablet, Oral, Daily               External Prenatal Results       Pregnancy Outside Results - Transcribed From Office Records - See Scanned Records For Details       Test Value Date Time    ABO  B  06/26/24 0018    Rh  Positive  06/26/24 0018    Antibody Screen  Negative  06/26/24 0018      ^ Normal  12/18/23     Varicella IgG       Rubella ^ 2.28  12/18/23     Hgb  8.9 g/dL 06/27/24 0532       9.6 g/dL 06/26/24 0018       9.8 g/dL 06/14/24 1224       10.0 g/dL 05/30/24 1240       9.9 g/dL 04/18/24 1324       10.3 g/dL 04/18/24 1324       10.9 g/dL 03/28/24 0747      ^ 10.4 g/dL 12/18/23     Hct  28.4 % 06/27/24 0532       30.5 % 06/26/24 0018       31.3 % 06/14/24 1224       31.9 % 05/30/24 1240       31.3 % 04/18/24 1324       34.0 % 03/28/24 0747      ^ 31 % 12/18/23     HgB A1c        1h GTT  109 mg/dL 04/18/24 1324    3h GTT Fasting       3h GTT 1 hour       3h GTT 2 hour       3h GTT 3 hour        Gonorrhea (discrete)  Negative  06/18/24 1005       Negative  06/14/24 1318       Negative  05/16/24 1334      ^ positive  11/30/23     Chlamydia (discrete)  Negative  06/18/24 1005       Negative  06/14/24 1318       Negative  05/16/24 1334      ^ positive  11/30/23     RPR  Non Reactive  04/18/24 1324      ^ Non-Reactive  12/18/23     Syphilis Antibody       HBsAg ^ Negative  12/18/23     Herpes Simplex Virus PCR       Herpes Simplex VIrus Culture       HIV ^ non-reactive  12/18/23     Hep C RNA Quant PCR       Hep C Antibody ^ negative  12/18/23     AFP       NIPT       Cystic Fibroisis        Group B Strep  Negative  06/18/24 1005    GBS Susceptibility to Clindamycin       GBS Susceptibility to Erythromycin       Fetal Fibronectin       Genetic Testing, Maternal Blood                 Drug Screening       Test Value Date Time    Urine Drug Screen       Amphetamine Screen       Barbiturate Screen       Benzodiazepine Screen       Methadone Screen       Phencyclidine Screen        Opiates Screen       THC Screen       Cocaine Screen       Propoxyphene Screen       Buprenorphine Screen       Methamphetamine Screen       Oxycodone Screen       Tricyclic Antidepressants Screen                 Legend    ^: Historical                            Discharge Disposition Home or Self Care   Condition on Discharge: good   Follow-up: 1 week with Dr. Ocampo or CLIFF Gallo for a blood pressure check       Plan for discharge reviewed with Dr. Ellis.    This note has been signed electronically.    Swati Gallo, YANCI, APRN, CLIFF, RNC-OB  6/28/2024

## 2024-06-28 NOTE — OUTREACH NOTE
[facility]  IP Postpartum    Questions/Answers      Flowsheet Row Responses   Best Method for Contacting Cell   Support Person Present Yes   Does the patient have a car seat at the hospital Yes   Delivery Note Reviewed Reviewed   Were birth expectations met? Yes   Is there a need for additional support/resources? No   Lactation Note Reviewed Reviewed   Is additional support needed? No   Any questions or concerns? No   Is the patient going to use Meds to Beds? Yes   Any concerns related discharge meds/ability to  prescriptions? No   OB Discharge Navigator Reviewed  Reviewed   Confirm Postpartum OB appointment Yes   Confirm initial well-child Pediatrician appointment date/time: Yes   Does patient have transportation to appointments? Yes   Does patient have supplies needed at home for  care? Clothing, Crib, Diapers, Formula          At Carmelita's bedside.  Infant in open crib. Carmelita states she has all necessary items at home for herself and baby. Postpartum check-in telephone appointment scheduled and reminder given to Carmelita.  [facility] reminder given also.     Lorene Márquez RN  Maternity Nurse Navigator    2024, 09:09 CDT

## 2024-06-28 NOTE — PAYOR COMM NOTE
"Dior Flores (24 y.o. Female)      ? Is pc required        listed 2nd ins and maternity  vag delivery       [phone]       admit 6/25   plan d/c 6/28            [facility]     Michele phone      fax                Date of Birth   2000    Social Security Number       Address   [address] [place] [address] [address] [facility] [address]    Home Phone   [phone]    MRN   [id number]       Yarsani   Other    Marital Status   Single                            Admission Date   6/25/24    Admission Type   Elective    Admitting Provider   Yesica Key MD    Attending Provider   Ally Ocampo MD    Department, Room/Bed   [facility] MOTHER BABY 2A, M223/1       Discharge Date       Discharge Disposition   Home or Self Care    Discharge Destination                                 Attending Provider: Ally Ocampo MD    Allergies: No Known Allergies    Isolation: None   Infection: COVID (History) (06/26/24)   Code Status: CPR    Ht: 162.6 cm (64\")   Wt: 104 kg (230 lb)    Admission Cmt: None   Principal Problem: Pregnancy [Z34.90]                   Active Insurance as of 6/25/2024       Primary Coverage       Payor Plan Insurance Group Employer/Plan Group    ANTHEM BLUE CROSS ANTHEM BLUE CROSS BLUE SHIELD PPO [id number]       Payor Plan Address Payor Plan Phone Number Payor Plan Fax Number Effective Dates    [address] [phone]  1/1/2024 - None Entered    [facility] [id number]         Subscriber Name Subscriber Birth Date Member ID       DIOR FLORES MARIZOL 2000 [id number]               Secondary Coverage       Payor Plan Insurance Group Employer/Plan Group    [place]        Payor Plan Address Payor Plan Phone Number Payor Plan Fax Number Effective Dates    [address]   11/1/2023 - None Entered    [place] [address]         Subscriber Name Subscriber Birth Date Member ID       DIOR FLORES " 2000 [id number]                     Emergency Contacts        (Rel.) Home Phone Work Phone Mobile Phone    BECCA FLORES (Mother) [phone] -- --                 History & Physical        Yesica Key MD at 24 0048          [facility]  Carmelita Flores  : 2000  MRN: [id number]  CSN: [id number]    History and Physical    Subjective  Carmelita Flores is a 24 y.o. year old  with an Estimated Date of Delivery: 24 presented tonight with irregular contractions.  No loss of fluid or vaginal bleeding.  Patient has chronic hypertension on medication, and her labetalol has been increased recently from twice daily to 3 times daily.  Despite this, blood pressure has been poorly controlled.  BP here has been 140/101, 146/96, 141/105, 156/97, 154/97, 155/95, and 142/100.  Patient specifically denies headache, blurry vision, or right upper quadrant pain.  She has been having nausea when her heartburn is really bad, but that is not new.  Patient is going to be kept for induction of labor due to  CHTN poorly-controlled on medication .  Prenatal care has been with Dr. Ally Ocampo and Jason Rodriguez.     OB History    Para Term  AB Living   2 1 0 1 0 1   SAB IAB Ectopic Molar Multiple Live Births   0 0 0 0 0 0      # Outcome Date GA Lbr Marco A/2nd Weight Sex Type Anes PTL Lv   2 Current            1  22 34w0d  2863 g (6 lb 5 oz) M Vag-Spont         Complications: Pre-eclampsia     Past Medical History:   Diagnosis Date    Migraine 24    I get migraines really bad all throughout the day and night     Past Surgical History:   Procedure Laterality Date    TONSILLECTOMY       No current facility-administered medications for this encounter.    No Known Allergies  Social History    Tobacco Use      Smoking status: Never      Smokeless tobacco: Never    Review of Systems   Constitutional:  Positive for activity change. Negative for unexpected weight  change.   Respiratory:  Negative for shortness of breath.    Cardiovascular:  Negative for chest pain.   Gastrointestinal:  Positive for abdominal pain (with contractions, but no pain in-between).   Genitourinary:  Negative for vaginal bleeding and vaginal discharge.         Objective  /100   Pulse 76   Temp 98.5 °F (36.9 °C) (Oral)   Resp 17   LMP 10/10/2023 (Exact Date)   SpO2 100%   Breastfeeding No   General: well developed; well nourished  no acute distress   Heart: Not performed.   Lungs: breathing is unlabored   Abdomen:  Cervix:  Presentation:  EFW by Leopold's:  EFW by recent u/s: soft, non-tender; no masses  fundus firm and non-tender  was checked (by RN): 4 cm / 80 % / -2  Vertex         Prenatal Labs  Lab Results   Component Value Date    HGB 9.8 (L) 2024    HEPBSAG Negative 2023    ABO B 2023    RH Positive 2023    ABSCRN Normal 2023    OLL3TVL1 non-reactive 2023    HEPCVIRUSABY negative 2023    URINECX 25,000 CFU/mL Mixed Alysha Isolated 2024       Recent Labs  Lab Results   Component Value Date    HGB 9.8 (L) 2024    HCT 31.3 (L) 2024    WBC 8.27 2024     2024           Assessment  IUP with an Estimated Date of Delivery: 24  Induction of labor for  CHTN poorly-controlled with meds .  The patient's pelvis feels clinically adequate for IOL to be appropriate, although she understands that this clinical judgement is not always accurate.  Group B Strep status: negative  Labs were just drawn         Plan  Risks and benefits of induction discussed.  Patient understands that IOL increases the risk of  delivery over spontaneous labor, especially if the patient does not have a favorable cervix.  Will start pitocin for IOL      Yesica Key MD  2024            Electronically signed by Yesica Key MD at 24 0054          Physician Progress Notes (last 72 hours)        Swati Gallo APRN at  "06/28/24 0715                KIERRA Caballero  [facility]  [address] [facility] [address]  [place] [address]  Office [phone]  Fax [phone]    [facility]  Vaginal Delivery Progress Note    Subjective   Postpartum Day 2: Vaginal Delivery    The patient feels well.  Her pain is well controlled with Motrin.   She is ambulating well.  Patient describes her bleeding as thin lochia.    Breastfeeding: declines.    Objective     Vital Signs Range for the last 24 hours  Temperature: Temp:  [97.8 °F (36.6 °C)-98.5 °F (36.9 °C)] 98.4 °F (36.9 °C)   Temp Source: Temp src: Temporal   BP: BP: (123-157)/() 144/85   Pulse: Heart Rate:  [77-89] 86   Respirations: Resp:  [16-18] 18   SPO2: SpO2:  [98 %-100 %] 100 %   O2 Amount (l/min):     O2 Devices Device (Oxygen Therapy): room air   Weight:       Admit Height:  Height: 162.6 cm (64\")      Physical Exam:  General:  no acute distresss.  Abdomen: Abdomen soft, without significant tenderness  Extremities: normal, atraumatic, no cyanosis, and trace edema.       Lab results reviewed:  Yes   Rubella:  No results found for: \"RUBELLAIGGIN\" Nurse Transcribed from prenatal record --  No components found for: \"EXTRUBELQUAL\"  Rh Status:    RH type   Date Value Ref Range Status   06/26/2024 Positive  Final     Comment:     PRIOR ABORH RECORDS NOT AVAILABLE FOR VERIFICATION     Immunizations:   Immunization History   Administered Date(s) Administered    COVID-19 F23 (PFIZER) 12YRS+ (COMIRNATY) 10/18/2023    DTaP 2002, 03/03/2003, 09/04/2003, 06/22/2006    Fluzone (or Fluarix & Flulaval for VFC) >6mos 10/18/2023    Hep A, 2 Dose 03/02/2018, 10/18/2018    Hep B / HiB 2002, 2002, 05/15/2003    Hep B, Adolescent or Pediatric 2002, 2002, 05/15/2003    HiB 2002, 2002, 05/15/2003    Hpv9 03/02/2018, 10/18/2018    IPV 2002, 2002, 05/15/2003, 06/22/2006    MMR 09/04/2003, 06/22/2006    Meningococcal MCV4P (Menactra) 10/18/2018    " Meningococcal, Unspecified 08/04/2014, 10/18/2018    Pneumococcal Conjugate 13-Valent (PCV13) 2002, 03/03/2003, 09/04/2003    Tdap 08/04/2014, 05/01/2024    Varicella 05/15/2003, 08/04/2014     Lab Results (last 24 hours)       Procedure Component Value Units Date/Time    Tissue Pathology Exam [[id number]] Collected: 06/26/24 0901    Specimen: Tissue from Placenta Updated: 06/27/24 1151     Note to Patients --     This report may contain a detailed description of human tissue sent by a health care provider to the laboratory for pathologic evaluation. The content of this report is essential for diagnosis and may provide important critical findings. This information may be unfamiliar to patients to review without a medical professional present. It is advised that the patient review this report in the presence of a health care provider who can answer questions and explain the results.         Case Report --     Surgical Pathology Report                         Case: [id number]                                  Authorizing Provider:  Yesica Key MD         Collected:           06/26/2024 09:01 AM          Ordering Location:     [facility]     Received:            06/26/2024 03:25 PM                                 LABOR DELIVERY                                                               Pathologist:           Ari Greenfield MD                                                      Specimen:    Placenta                                                                                    Final Diagnosis --     Placenta, lua, spontaneous vaginal delivery:  Three-vessel umbilical cord.  Unremarkable fetal membranes.  Subserosal lamellar fibrin deposits.  Late third trimester chorionic villi.  Placenta weight of 527 g.       Gross Description --     1. Placenta.  Specimen #1 is received in formalin and labeled with the patient's name, medical record number, accession number and designated  "\"placenta\". The specimen consists of a round/oval lua placental disc with attached membranes and umbilical cord. The placental disc measures 16.1 x 15.4 x 3.8 cm. The membranes and umbilical cord are removed and the disc weighs 527 g. The maternal surface is red-brown and intact. Sectioning reveals red-brown, soft and spongy parenchyma with no fibrous deposition or intraparenchymal hemorrhage identified. The fetal surface is blue-gray with no significant discoloration. Subchorionic fibrin comprises less than 5% of the surface. The umbilical cord inserts eccentrically, 3.1 cm from the margin.  The cord measures 18.5 cm in length by 1.3 cm in diameter. The external surface is blue-gray with no significant discoloration. Sectioning reveals three patent vessels with no stricture or thrombus identified. The membranes insert at the margin and are tan-pink and translucent.    Representative section of peripheral disc is submitted in (block 1A). Representative section of central disc is submitted in (block 1B). Representative sections of cord and membranes are submitted in (block 1C).         Microscopic Description --     The three-vessel umbilical cord is unremarkable.  The fetal membranes are unremarkable.  The fetal surface shows subserosal lamellar fibrin deposits.  The sectioned maternal surface shows late third trimester chronic villi.              External Prenatal Results       Pregnancy Outside Results - Transcribed From Office Records - See Scanned Records For Details       Test Value Date Time    ABO  B  06/26/24 0018    Rh  Positive  06/26/24 0018    Antibody Screen  Negative  06/26/24 0018      ^ Normal  12/18/23     Varicella IgG       Rubella ^ 2.28  12/18/23     Hgb  8.9 g/dL 06/27/24 0532       9.6 g/dL 06/26/24 0018       9.8 g/dL 06/14/24 1224       10.0 g/dL 05/30/24 1240       9.9 g/dL 04/18/24 1324       10.3 g/dL 04/18/24 1324       10.9 g/dL 03/28/24 0747      ^ 10.4 g/dL 12/18/23     Hct  " 28.4 % 06/27/24 0532       30.5 % 06/26/24 0018       31.3 % 06/14/24 1224       31.9 % 05/30/24 1240       31.3 % 04/18/24 1324       34.0 % 03/28/24 0747      ^ 31 % 12/18/23     HgB A1c        1h GTT  109 mg/dL 04/18/24 1324    3h GTT Fasting       3h GTT 1 hour       3h GTT 2 hour       3h GTT 3 hour        Gonorrhea (discrete)  Negative  06/18/24 1005       Negative  06/14/24 1318       Negative  05/16/24 1334      ^ positive  11/30/23     Chlamydia (discrete)  Negative  06/18/24 1005       Negative  06/14/24 1318       Negative  05/16/24 1334      ^ positive  11/30/23     RPR  Non Reactive  04/18/24 1324      ^ Non-Reactive  12/18/23     Syphilis Antibody       HBsAg ^ Negative  12/18/23     Herpes Simplex Virus PCR       Herpes Simplex VIrus Culture       HIV ^ non-reactive  12/18/23     Hep C RNA Quant PCR       Hep C Antibody ^ negative  12/18/23     AFP       NIPT       Cystic Fibroisis        Group B Strep  Negative  06/18/24 1005    GBS Susceptibility to Clindamycin       GBS Susceptibility to Erythromycin       Fetal Fibronectin       Genetic Testing, Maternal Blood                 Drug Screening       Test Value Date Time    Urine Drug Screen       Amphetamine Screen       Barbiturate Screen       Benzodiazepine Screen       Methadone Screen       Phencyclidine Screen       Opiates Screen       THC Screen       Cocaine Screen       Propoxyphene Screen       Buprenorphine Screen       Methamphetamine Screen       Oxycodone Screen       Tricyclic Antidepressants Screen                 Legend    ^: Historical                            Assessment & Plan       Pregnancy      Carmelita Powers is Day 2  post-partum  Vaginal, Spontaneous   .      Plan:  Continue postpartum plan of care. Discharge instructions provided. Questions answered and understanding verbalized. Return to clinic within 1 week for blood pressure check.       This note has been signed electronically.    Swati Gallo, DNP, APRN, CNM,  "RNC-OB  6/28/2024  08:05 CDT        Electronically signed by Swati Gallo APRN at 06/28/24 0807       Swati Gallo APRN at 06/27/24 0725                KIERRA Caballero  [facility]  [address] [facility] [address]  [place] [address]  Office [phone]  Fax [phone]    [facility]  Vaginal Delivery Progress Note    Subjective   Postpartum Day 1: Vaginal Delivery    The patient feels well.  Her pain is well controlled with Motrin.   She is ambulating well.  Patient describes her bleeding as thin lochia. She denies visual disturbances, headache, or right upper quadrant pain    Breastfeeding: declines.    Objective     Vital Signs Range for the last 24 hours  Temperature: Temp:  [97.8 °F (36.6 °C)-98.4 °F (36.9 °C)] 98.3 °F (36.8 °C)   Temp Source: Temp src: Oral   BP: BP: (122-157)/() 146/87   Pulse: Heart Rate:  [67-89] 89   Respirations: Resp:  [16-18] 18   SPO2: SpO2:  [98 %-100 %] 98 %   O2 Amount (l/min):     O2 Devices Device (Oxygen Therapy): room air   Weight:       Admit Height:  Height: 162.6 cm (64\")      Physical Exam:  General:  no acute distresss.  Abdomen: abdomen is soft without significant tenderness, masses, organomegaly or guarding. Fundus: appropriate, firm, non tender  Extremities: normal, atraumatic, no cyanosis, and trace edema.       Lab results reviewed:  Yes   Rubella:  No results found for: \"RUBELLAIGGIN\" Nurse Transcribed from prenatal record --  No components found for: \"EXTRUBELQUAL\"  Rh Status:    RH type   Date Value Ref Range Status   06/26/2024 Positive  Final     Comment:     PRIOR ABORH RECORDS NOT AVAILABLE FOR VERIFICATION     Immunizations:   Immunization History   Administered Date(s) Administered    COVID-19 F23 (PFIZER) 12YRS+ (COMIRNATY) 10/18/2023    DTaP 2002, 03/03/2003, 09/04/2003, 06/22/2006    Fluzone (or Fluarix & Flulaval for VFC) >6mos 10/18/2023    Hep A, 2 Dose 03/02/2018, 10/18/2018    Hep B / HiB 2002, 2002, 05/15/2003    Hep B, " Adolescent or Pediatric 2002, 2002, 05/15/2003    HiB 2002, 2002, 05/15/2003    Hpv9 03/02/2018, 10/18/2018    IPV 2002, 2002, 05/15/2003, 06/22/2006    MMR 09/04/2003, 06/22/2006    Meningococcal MCV4P (Menactra) 10/18/2018    Meningococcal, Unspecified 08/04/2014, 10/18/2018    Pneumococcal Conjugate 13-Valent (PCV13) 2002, 03/03/2003, 09/04/2003    Tdap 08/04/2014, 05/01/2024    Varicella 05/15/2003, 08/04/2014     Lab Results (last 24 hours)       Procedure Component Value Units Date/Time    Tissue Pathology Exam [[id number]] Collected: 06/26/24 0901    Specimen: Tissue from Placenta Updated: 06/27/24 1151     Note to Patients --     This report may contain a detailed description of human tissue sent by a health care provider to the laboratory for pathologic evaluation. The content of this report is essential for diagnosis and may provide important critical findings. This information may be unfamiliar to patients to review without a medical professional present. It is advised that the patient review this report in the presence of a health care provider who can answer questions and explain the results.         Case Report --     Surgical Pathology Report                         Case: [id number]                                  Authorizing Provider:  Yesica Key MD         Collected:           06/26/2024 09:01 AM          Ordering Location[facility]     [facility]     Received:            06/26/2024 03:25 PM                                 LABOR DELIVERY                                                               Pathologist:           Ari Greenfield MD                                                      Specimen:    Placenta                                                                                    Final Diagnosis --     Placenta, lua, spontaneous vaginal delivery:  Three-vessel umbilical cord.  Unremarkable fetal membranes.  Subserosal  "lamellar fibrin deposits.  Late third trimester chorionic villi.  Placenta weight of 527 g.       Gross Description --     1. Placenta.  Specimen #1 is received in formalin and labeled with the patient's name, medical record number, accession number and designated \"placenta\". The specimen consists of a round/oval lua placental disc with attached membranes and umbilical cord. The placental disc measures 16.1 x 15.4 x 3.8 cm. The membranes and umbilical cord are removed and the disc weighs 527 g. The maternal surface is red-brown and intact. Sectioning reveals red-brown, soft and spongy parenchyma with no fibrous deposition or intraparenchymal hemorrhage identified. The fetal surface is blue-gray with no significant discoloration. Subchorionic fibrin comprises less than 5% of the surface. The umbilical cord inserts eccentrically, 3.1 cm from the margin.  The cord measures 18.5 cm in length by 1.3 cm in diameter. The external surface is blue-gray with no significant discoloration. Sectioning reveals three patent vessels with no stricture or thrombus identified. The membranes insert at the margin and are tan-pink and translucent.    Representative section of peripheral disc is submitted in (block 1A). Representative section of central disc is submitted in (block 1B). Representative sections of cord and membranes are submitted in (block 1C).         Microscopic Description --     The three-vessel umbilical cord is unremarkable.  The fetal membranes are unremarkable.  The fetal surface shows subserosal lamellar fibrin deposits.  The sectioned maternal surface shows late third trimester chronic villi.      CBC & Differential [[id number]]  (Abnormal) Collected: 06/27/24 0532    Specimen: Blood Updated: 06/27/24 0583    Narrative:      The following orders were created for panel order CBC & Differential.  Procedure                               Abnormality         Status                     ---------                     "           -----------         ------                     CBC Auto Differential[[id number]]        Abnormal            Final result                 Please view results for these tests on the individual orders.    CBC Auto Differential [[id number]]  (Abnormal) Collected: 06/27/24 0532    Specimen: Blood Updated: 06/27/24 0552     WBC 9.40 10*3/mm3      RBC 3.75 10*6/mm3      Hemoglobin 8.9 g/dL      Hematocrit 28.4 %      MCV 75.7 fL      MCH 23.7 pg      MCHC 31.3 g/dL      RDW 13.5 %      RDW-SD 36.5 fl      MPV 12.7 fL      Platelets 205 10*3/mm3      Neutrophil % 65.0 %      Lymphocyte % 23.7 %      Monocyte % 8.7 %      Eosinophil % 1.9 %      Basophil % 0.3 %      Immature Grans % 0.4 %      Neutrophils, Absolute 6.10 10*3/mm3      Lymphocytes, Absolute 2.23 10*3/mm3      Monocytes, Absolute 0.82 10*3/mm3      Eosinophils, Absolute 0.18 10*3/mm3      Basophils, Absolute 0.03 10*3/mm3      Immature Grans, Absolute 0.04 10*3/mm3      nRBC 0.0 /100 WBC             External Prenatal Results       Pregnancy Outside Results - Transcribed From Office Records - See Scanned Records For Details       Test Value Date Time    ABO  B  06/26/24 0018    Rh  Positive  06/26/24 0018    Antibody Screen  Negative  06/26/24 0018      ^ Normal  12/18/23     Varicella IgG       Rubella ^ 2.28  12/18/23     Hgb  8.9 g/dL 06/27/24 0532       9.6 g/dL 06/26/24 0018       9.8 g/dL 06/14/24 1224       10.0 g/dL 05/30/24 1240       9.9 g/dL 04/18/24 1324       10.3 g/dL 04/18/24 1324       10.9 g/dL 03/28/24 0747      ^ 10.4 g/dL 12/18/23     Hct  28.4 % 06/27/24 0532       30.5 % 06/26/24 0018       31.3 % 06/14/24 1224       31.9 % 05/30/24 1240       31.3 % 04/18/24 1324       34.0 % 03/28/24 0747      ^ 31 % 12/18/23     HgB A1c        1h GTT  109 mg/dL 04/18/24 1324    3h GTT Fasting       3h GTT 1 hour       3h GTT 2 hour       3h GTT 3 hour        Gonorrhea (discrete)  Negative  06/18/24 1005       Negative  06/14/24 1313        Negative  05/16/24 1334      ^ positive  11/30/23     Chlamydia (discrete)  Negative  06/18/24 1005       Negative  06/14/24 1318       Negative  05/16/24 1334      ^ positive  11/30/23     RPR  Non Reactive  04/18/24 1324      ^ Non-Reactive  12/18/23     Syphilis Antibody       HBsAg ^ Negative  12/18/23     Herpes Simplex Virus PCR       Herpes Simplex VIrus Culture       HIV ^ non-reactive  12/18/23     Hep C RNA Quant PCR       Hep C Antibody ^ negative  12/18/23     AFP       NIPT       Cystic Fibroisis        Group B Strep  Negative  06/18/24 1005    GBS Susceptibility to Clindamycin       GBS Susceptibility to Erythromycin       Fetal Fibronectin       Genetic Testing, Maternal Blood                 Drug Screening       Test Value Date Time    Urine Drug Screen       Amphetamine Screen       Barbiturate Screen       Benzodiazepine Screen       Methadone Screen       Phencyclidine Screen       Opiates Screen       THC Screen       Cocaine Screen       Propoxyphene Screen       Buprenorphine Screen       Methamphetamine Screen       Oxycodone Screen       Tricyclic Antidepressants Screen                 Legend    ^: Historical                            Assessment & Plan       Pregnancy      Carmelita Powers is Day 1  post-partum  Vaginal, Spontaneous   .      Plan:  Continue with current postpartum plan of care. Continue to monitor blood pressures. Discussed nonpharmacologic measures for discomfort, including a warm aqua k-pad. Discussed also iron supplementation.       This note has been signed electronically.    Swati Gallo DNP, KIERRA, CNM, RNC-OB  6/27/2024  15:53 CDT        Electronically signed by Swati Gallo APRN at 06/27/24 7540       Ally Ocampo MD at 06/26/24 2812              Ally Ocampo MD  [facility]  [address] [place] [address]  Office [phone]  Fax [phone]      [facility]  Carmelita Danica  Priya  : 2000  MRN: [id number]  CSN: [id number]    Labor progress note    Subjective   She reports is having no problems    Objective   Min/max vitals past 24 hours:  Temp  Min: 98 °F (36.7 °C)  Max: 98.5 °F (36.9 °C)   BP  Min: 121/86  Max: 162/92   Pulse  Min: 60  Max: 93   Resp  Min: 16  Max: 17        FHT's: reactive and category 1.  external monitors used   Cervix: was checked (by me): 5 cm / 80 % / -2, AROM with clear fluid   Contractions: regular every 4 minutes     Assessment   IUP at 37w1d  CHTN poorly controlled  Admitted for labor induction due to poorly controlled CHTN at 4 cm.       Plan   AROM - clear fluid seen  Allow labor to continue pending maternal and fetal status  Plan discussed with family and questions answered.  Understanding verbalized.    Ally Ocampo MD  2024  07:55 CDT            Electronically signed by Ally Ocampo MD at [phone]       Regina Reaves MD at [phone]          [facility]  : 2000  MRN: [id number]  CSN: [id number]    Labor & Delivery EULALIA progress note    Subjective   Chief Complaint: She reports painful contractions    HPI: Increased contractions this evening. Known CHTN on meds, not controlled, however, hadn't taken labetalol since 9am. Records show tid dosing, patient states she takes it bid. BP not controlled    History:    Prenatal Information:  Prenatal Results       Initial Prenatal Labs       Test Value Reference Range Date Time    Hemoglobin ^ 10.4 g/dL  23     Hematocrit ^ 31 %  23     Platelets ^ 247 K/µL  23     Rubella IgG ^ 2.28   23     Hepatitis B SAg ^ Negative   23     Hepatitis C Ab ^ negative   23     RPR  Non Reactive  Non Reactive 24 1324      ^ Non-Reactive   23     T. Pallidum Ab         ABO ^ B   23     Rh ^ Positive   23     Antibody Screen ^ Normal  Normal 23     HIV ^ non-reactive   23     Urine  Culture  25,000 CFU/mL Mixed Alysha Isolated   06/12/24 0514       <10,000 CFU/mL Normal Urogenital Alysha   03/28/24 0856      ^ See table below  No growth at 24 hours, No growth at 2 days, No growth at 3 days, No growth, Growth present, too young to evaluate, Culture in progress 12/18/23     Gonorrhea  Negative  Negative 06/18/24 1005       Negative  Negative 06/14/24 1318       Negative  Negative 05/16/24 1334      ^ positive   11/30/23     Chlamydia  Negative  Negative 06/18/24 1005       Negative  Negative 06/14/24 1318       Negative  Negative 05/16/24 1334      ^ positive   11/30/23     TSH        HgB A1c         Varicella IgG        Hemoglobinopathy Fractionation        Hemoglobinopathy (genetic testing)        Cystic fibrosis                   Fetal testing        Test Value Reference Range Date Time    NIPT        MSAFP        AFP-4                  2nd and 3rd Trimester       Test Value Reference Range Date Time    Hemoglobin (repeated)  9.8 g/dL 12.0 - 15.9 06/14/24 1224       10.0 g/dL 11.1 - 15.9 05/30/24 1240       9.9 g/dL 11.1 - 15.9 04/18/24 1324       10.3 g/dL 11.1 - 15.9 04/18/24 1324       10.9 g/dL 12.0 - 15.9 03/28/24 0747    Hematocrit (repeated)  31.3 % 34.0 - 46.6 06/14/24 1224       31.9 % 34.0 - 46.6 05/30/24 1240       31.3 % 34.0 - 46.6 04/18/24 1324       34.0 % 34.0 - 46.6 03/28/24 0747    Platelets   207 10*3/mm3 140 - 450 06/14/24 1224       254 x10E3/uL 150 - 450 05/30/24 1240       203 x10E3/uL 150 - 450 04/18/24 1324       227 10*3/mm3 140 - 450 03/28/24 0747      ^ 247 K/µL  12/18/23     1 hour GTT   109 mg/dL 70 - 139 04/18/24 1324    Antibody Screen (repeated)        3rd TM syphilis scrn (repeated)  RPR   Non Reactive  Non Reactive 04/18/24 1324    3rd TM syphilis scrn (repeated) FTA        GTT Fasting        GTT 1 Hr        GTT 2 Hr        GTT 3 Hr        Group B Strep  Negative  Negative 06/18/24 1005              Other testing        Test Value Reference Range Date Time     Parvo IgG         CMV IgG                   Drug Screening       Test Value Reference Range Date Time    Amphetamine Screen        Barbiturate Screen        Benzodiazepine Screen        Methadone Screen        Phencyclidine Screen        Opiates Screen        THC Screen        Cocaine Screen        Propoxyphene Screen        Buprenorphine Screen        Methamphetamine Screen        Oxycodone Screen        Tricyclic Antidepressants Screen                  Legend    ^: Historical                          External Prenatal Results       Pregnancy Outside Results - Transcribed From Office Records - See Scanned Records For Details       Test Value Date Time    ABO ^ B  12/18/23     Rh ^ Positive  12/18/23     Antibody Screen ^ Normal  12/18/23     Varicella IgG       Rubella ^ 2.28  12/18/23     Hgb  9.8 g/dL 06/14/24 1224       10.0 g/dL 05/30/24 1240       9.9 g/dL 04/18/24 1324       10.3 g/dL 04/18/24 1324       10.9 g/dL 03/28/24 0747      ^ 10.4 g/dL 12/18/23     Hct  31.3 % 06/14/24 1224       31.9 % 05/30/24 1240       31.3 % 04/18/24 1324       34.0 % 03/28/24 0747      ^ 31 % 12/18/23     HgB A1c        1h GTT  109 mg/dL 04/18/24 1324    3h GTT Fasting       3h GTT 1 hour       3h GTT 2 hour       3h GTT 3 hour        Gonorrhea (discrete)  Negative  06/18/24 1005       Negative  06/14/24 1318       Negative  05/16/24 1334      ^ positive  11/30/23     Chlamydia (discrete)  Negative  06/18/24 1005       Negative  06/14/24 1318       Negative  05/16/24 1334      ^ positive  11/30/23     RPR  Non Reactive  04/18/24 1324      ^ Non-Reactive  12/18/23     Syphilis Antibody       HBsAg ^ Negative  12/18/23     Herpes Simplex Virus PCR       Herpes Simplex VIrus Culture       HIV ^ non-reactive  12/18/23     Hep C RNA Quant PCR       Hep C Antibody ^ negative  12/18/23     AFP       NIPT       Cystic Fibroisis        Group B Strep  Negative  06/18/24 1005    GBS Susceptibility to Clindamycin       GBS Susceptibility  to Erythromycin       Fetal Fibronectin       Genetic Testing, Maternal Blood                 Drug Screening       Test Value Date Time    Urine Drug Screen       Amphetamine Screen       Barbiturate Screen       Benzodiazepine Screen       Methadone Screen       Phencyclidine Screen       Opiates Screen       THC Screen       Cocaine Screen       Propoxyphene Screen       Buprenorphine Screen       Methamphetamine Screen       Oxycodone Screen       Tricyclic Antidepressants Screen                 Legend    ^: Historical                             Past OB History:     OB History    Para Term  AB Living   2 1 0 1 0 1   SAB IAB Ectopic Molar Multiple Live Births   0 0 0 0 0 0      # Outcome Date GA Lbr Marco A/2nd Weight Sex Type Anes PTL Lv   2 Current            1  22 34w0d  2863 g (6 lb 5 oz) M Vag-Spont         Complications: Pre-eclampsia       Past Medical History: Past Medical History:   Diagnosis Date    Migraine 24    I get migraines really bad all throughout the day and night      Past Surgical History Past Surgical History:   Procedure Laterality Date    TONSILLECTOMY        Family History: Family History   Problem Relation Age of Onset    Breast cancer Neg Hx     Ovarian cancer Neg Hx     Uterine cancer Neg Hx     Colon cancer Neg Hx     Melanoma Neg Hx       Social History:  reports that she has never smoked. She has never used smokeless tobacco.   reports that she does not currently use alcohol.   reports no history of drug use.           High Risk Medical Conditions/Complaints this visit: CHTN on meds, uncontrolled    Discussion of Management or Test Interpretation with physcian/provider/healthcare provider: Yes, Dr. Key    External Records Reviewed: [facility] [facility] [place] [facility] [place] ANNALEE Ocampo ultrasound reviewed, pregnancy complicated by: chronic hypertension uncontrolled on meds, morbid obesity     Review Previous Test Results: Prenatal labs in Epic  reviewed, history of: chlamydia/gonorrhea in this pregnancy and anemia    Independent Historian: none    Social Determinants to Health: No drug, transportation or housing or other issues noted      Objective   Medical Decision Making:  Amount/Complexity of Data Reviewed  -Labs ordered - PIH  -Imaging ordered - no  -IV fluids given - no  Risk:  Prescription drug management - labetalol  Drug therapy requiring intensive monitoring for toxicity - no  Decision to admit patient  Diagnosis/Treatment limited by social determinants    Min/max vitals past 24 hours:  Temp  Min: 98.5 °F (36.9 °C)  Max: 98.5 °F (36.9 °C)   BP  Min: 138/84  Max: 157/92   Pulse  Min: 67  Max: 81   Resp  Min: 17  Max: 17        Independent Interpretation NST/FHT's: reassuring and category 1.  external monitors used   Cervix: was checked (by RN): 4 cm / 80 % / -1   Contractions:    Review of current test: results regular    N/a       Final Diagnoses: Chronic hypertension on meds, uncontrolled      Assessment   IUP at 37w1d  CHTN on meds, uncontrolled with favorable cervix    Plan   Admit for delivery    Regina Reaves MD  6/26/2024  00:08 CDT      Electronically signed by Regina Reaves MD at 06/26/24 0055

## 2024-06-28 NOTE — PLAN OF CARE
Goal Outcome Evaluation:  Plan of Care Reviewed With: patient        Progress: improving     VSS with BP slightly elevated but within parameters, labetalol continued, voiding, ambulating, resting between care.

## 2024-06-28 NOTE — PAYOR COMM NOTE
"Dior Flores (24 y.o. Female)  [id number]     6/14   CLINICAL    [facility] phone    fax          Date of Birth   2000    Social Security Number       Address   [address] [facility] [address]    Home Phone   [phone]    MRN   [id number]       Caodaism   Other    Marital Status   Single                            Admission Date   6/14/24    Admission Type   Elective    Admitting Provider   Ally Ocampo MD    Attending Provider       Department, Room/Bed   [facility] LABOR DELIVERY, L02/1       Discharge Date   6/15/2024    Discharge Disposition   Home or Self Care    Discharge Destination                                 Attending Provider: (none)   Allergies: No Known Allergies    Isolation: None   Infection: COVID (History) (06/26/24)   Code Status: CPR    Ht: 162.6 cm (64\")   Wt: 104 kg (230 lb)    Admission Cmt: None   Principal Problem: Preexisting hypertension complicating pregnancy, antepartum, third trimester [O10.913]                   Active Insurance as of 6/14/2024       Primary Coverage       Payor Plan Insurance Group Employer/Plan Group    ANTHEM BLUE CROSS ANTHEM BLUE CROSS BLUE SHIELD PPO [id number]       Payor Plan Address Payor Plan Phone Number Payor Plan Fax Number Effective Dates    [address] [phone]  1/1/2024 - None Entered    [facility] [address]         Subscriber Name Subscriber Birth Date Member ID       DIOR FLORES 2000 [id number]               Secondary Coverage       Payor Plan Insurance Group Employer/Plan Group    [place]        Payor Plan Address Payor Plan Phone Number Payor Plan Fax Number Effective Dates    [address]   11/1/2023 - None Entered    [facility] [id number]         Subscriber Name Subscriber Birth Date Member ID       DIOR FLORES MARIZOL 2000 [id number]                     Emergency Contacts       " " (Rel.) Home Phone Work Phone Mobile Phone    BECCA FLORES (Mother) [phone] -- --                Cleo, Swati L, APRN   Nurse Practitioner  Specialty: Nurse Practitioner     Progress Notes     Signed     Encounter Date: 6/14/2024   Related encounter: Routine Prenatal from 6/14/2024 in [facility] OBGYN with Swati Gallo APRN     Signed         Reason for visit: Routine OB visit at 35w3d      CC:  She feels more tired today and relates she has been having some episodes of contractions. She has been having some nausea without vomiting. Reports visual disturbances over the last couple of days which have ranged from dark spots when she blinks or looks down and then up. She has also noted flutters \"when just looking out.\" She has had headaches over the last few days but not at present. Endorses good fetal movement. Denies VB, LOF, or right upper quadrant pain.      ROS: All systems reviewed and are negative with exception of the following: nausea, visual changes, fatigue, amenorrhea, contractions     Weight 102 kg (224 lb); /84; ; FH 35 cm  Total weight gain: -2.722 kg (-6 lb) with Total weight gain expected 5 kg (11 lb)-9 kg (19 lb)     Urine today and reviewed: negative glucose, 2+ protein     31-week Anatomy scan: EFW 17%, 1581 g; AC 26%; KORY 10.1 cm, DVP 4.2 cm; normal interval growth; vertex presentation; anterior placenta      Exam:  General Appearance:  No visualized signs of distress. Normal mood and behavior  Cardiorespiratory: HR str and reg. Lungs clear. Resp even and unlabored.  Abdomen: is soft and nontender. No CVA tenderness. Uterus is consistent with estimated gestational age.  Ext: No edema. Calves non-tender.      Impression  Diagnoses and all orders for this visit:     1. 35 weeks gestation of pregnancy (Primary)  Discussed and encouraged practicing measures of relaxation during the birthing experience. Also discussed measures to support or promote " labor at term.   -     POC Urinalysis Dipstick  -     Chlamydia trachomatis, Neisseria gonorrhoeae, Trichomonas vaginalis, PCR - Urine, Urine, Random Void  -     Ferric Maltol (ACCRUFeR) 30 MG capsule; Take 1 capsule by mouth 2 (Two) Times a Day.  Dispense: 10 capsule; Refill: 0  -     Ferric Maltol (ACCRUFeR) 30 MG capsule; Take 1 capsule by mouth 2 (Two) Times a Day.  Dispense: 60 capsule; Refill: 3     2. Multigravida in third trimester  Discussed third trimester of pregnancy, discomforts and measures of support, fetal movement counts,  labor warnings, preeclampsia warnings, and signs and symptoms to report. Discussed plan for next visit to include cultures for GBS, chlamydia, and gonorrhea. Patient to come to the hospital or call for vaginal bleeding, leaking of fluid, regular or intense contractions, or other concerns. Signs and Symptoms of Labor and Alternative Pain Management During Labor and Delivery videos included in the AVS.     3. Chronic hypertension in obstetric context in third trimester  Plan to sent to LDR for serial blood pressure monitoring and hypertension related labs for elevated blood pressures in the office today, nausea with visual changes, proteinuria, recent headaches, and being chronic hypertension. Will adjust plan of care based upon findings.      4. Maternal anemia in pregnancy, antepartum  -     Ferric Maltol (ACCRUFeR) 30 MG capsule; Take 1 capsule by mouth 2 (Two) Times a Day.  Dispense: 10 capsule; Refill: 0  -     Ferric Maltol (ACCRUFeR) 30 MG capsule; Take 1 capsule by mouth 2 (Two) Times a Day.  Dispense: 60 capsule; Refill: 3     5. Trichomonal vaginitis during pregnancy in third trimester  Discussed with patient sending her urine as follow-up after taking prescription.   -     Chlamydia trachomatis, Neisseria gonorrhoeae, Trichomonas vaginalis, PCR - Urine, Urine, Random Void           Return to the office early next week for a routine prenatal visit with Dr. Ocampo  with interval growth ultrasound with BPP for chronic hypertension and as needed with concerns.           This note has been signed electronically.     Swati Gallo DNP, APRN, CNM, RNC-OB                        Jason Rodriguez MD   Physician  Obstetrics     H&P     Signed     Date of Service: 24  Creation Time: 24     Signed       Expand All Collapse All[]Expand All by Default     [facility]  HISTORY AND PHYSICAL, OBGYN     Patient Name: Carmelita Powers  : 2000  MRN: [id number]  Primary Care Physician:  Provider, No Known  Date of admission: 2024        Subjective  Subjective      Chief Complaint:        Chief Complaint   Patient presents with    Hypertension-Pregnant       Sent from office         HPI: Carmelita Powers is a 24 y.o. year old  with an 2024, by Last Menstrual Period currently at 35w3d presenting with  elevated blood pressures . She reports doing ok at the moment. Has had intermittent episodes of blurry vision. None currently. Denies preeclampsia symptoms at this time.      Prenatal care has been with Ally Ocampo MD  It has been noteworthy for:  Preexisting hypertension diagnosed during pregnancy, not on antihypertensives  Maternal anemia     ROS:  All systems were reviewed and negative except for:   -endorses appropriate fetal movement  -denies regular contractions, leakage of fluid or vaginal bleeding  -denies preeclampsia symptoms including headache, changes in vision or RUQ pain     Personal History                        OB History    Para Term  AB Living   2 1 0 1 0 1   SAB IAB Ectopic Molar Multiple Live Births    0 0 0 0 0 0       # Outcome Date GA Lbr Marco A/2nd Weight Sex Type Anes PTL Lv   2 Current                     1  22 34w0d   2863 g (6 lb 5 oz) M Vag-Spont            Complications: Pre-eclampsia         Medical History        Past Medical History:   Diagnosis Date    Migraine 24     I  get migraines really bad all throughout the day and night            Surgical History         Past Surgical History:   Procedure Laterality Date    TONSILLECTOMY                Family History: family history is not on file. Otherwise pertinent FHx was reviewed and not pertinent to current issue.     Social History:  reports that she has never smoked. She has never used smokeless tobacco. She reports that she does not currently use alcohol. She reports that she does not use drugs.     Home Medications:  PNV Prenatal Plus Multivit+DHA, aspirin, metoclopramide, omeprazole, and ondansetron ODT     Allergies:  Allergies   No Known Allergies              Objective  Objective      Vitals:   Temp:  [97.8 °F (36.6 °C)] 97.8 °F (36.6 °C)  Heart Rate:  [76-88] 86  Resp:  [16] 16  BP: (138-156)/() 154/102     Physical Exam                General: Well Developed, Well Nourished, not in acute distress   HEENT: normocephalic, atraumatic, conjunctiva non-icteric    Respiratory: non-labored, symmetrical chest rise   Gastrointestinal: gravid, soft, no TTP, no rebound tenderness or guarding to palpation, no palpable ctx   Neurologic: alert and oriented, CN II-XII grossly intact without focal deficit, DTRs 2+ lower extremities, no clonus   Psychiatric: normal mood, pleasant, cooperative  Musculoskeletal: negative calf tenderness, trace lower extremity edema  Skin: warm & dry, no cyanosis, no jaundice     Pelvic Exam:  deferred     Electronic Fetal Monitoring  Baseline AKS203, moderate variability, (+) Accelerations, and (-) Decelerations  Tocometer: none   Interpretation: reassuring and reactive     Imaging  5/16  Intrauterine pregnancy at 31w2d  Placental location: Anterior  Normal Interval growth  EFW:17%ile, AC:26%ile  Estimated fetal weight:  1581 g  Fetal position: Vertex  KORY: 10.1 cm  DVP: 4.2 cm  Fetal heart rate: 125     Prenatal Labs        Lab Results   Component Value Date     HGB 9.8 (L) 06/14/2024     RUBELLAABIGG  2.28 2023     HEPBSAG Negative 2023     ABSCRN Normal 2023     MJP7MVM4 non-reactive 2023     HEPCVIRUSABY negative 2023      2024     URINECX 25,000 CFU/mL Mixed Alysha Isolated 2024     CHLAMNAA Negative 2024     NGONORRHON Negative 2024                  Result Review  Result Review:  I have personally reviewed the results from the time of this admission to 2024 14:58 CDT and agree with these findings:  [x]  Laboratory list / accordion  [x]  Microbiology  [x]  Radiology  []  EKG/Telemetry   []  Cardiology/Vascular   []  Pathology  []  Old records  []  Other:  Most notable findings include:   Preelampsia Labs:          Results from last 7 days   Lab Units 24  1224   WBC 10*3/mm3 8.27   HEMOGLOBIN g/dL 9.8*   HEMATOCRIT % 31.3*   PLATELETS 10*3/mm3 207   POTASSIUM mmol/L 3.3*   ALT (SGPT) U/L 5   AST (SGOT) U/L 11   CREATININE mg/dL 0.68   BILIRUBIN mg/dL 0.6   LDH U/L 151   URIC ACID mg/dL 6.2*                     Assessment & Plan  Assessment / Plan      Carmelita Powers is a 24 y.o. year old  with an 2024, by Last Menstrual Period currently at 35w3d presenting with elevated blood pressures. .     Pregnancy, 35w3d  Chronic hypertension  Maternal anemia     PLAN  -observation 24 hours  -start labetalol PO for hypertension. Previously not on medications  -24-hour urine protein  -monitor for signs/symptoms of severe features  -regular diet  -NST 1 hour each shift  -SCDs while in bed     Jason Rodriguez MD  2024  14:58 CDT                          [facility] Evaluation: Pt sent from office with elevated BP; Labetalol 200 PO q 12; NST q 12 hrs; 24 hour urine in progress until 1345 on 6/15/24    6/15  . 35w4d. 24 hr urine in progress. NST qshift. VSS. No c/o pain.        Jason Rodriguez MD   Physician  Obstetrics     Progress Notes     Signed     Date of Service: 06/15/24 0806  Creation Time: 06/15/24 0806     Signed        Expand All Collapse All[]Expand All by Default         Jason Rodriguez MD  [facility]  [address]  [place] [address]  Office [phone]  Fax [phone]        [facility]  Carmelita Powers  :   2000  MRN:   [id number]  CSN:    [id number]     Antepartum Progress note        Subjective  She reports is having no problems. Denies preeclampsia symptoms. Endorses appropriate fetal movement. Denies contractions or leakage of fluid.            Objective  Min/max vitals past 24 hours:  Temp  Min: 97.4 °F (36.3 °C)  Max: 98.6 °F (37 °C)   BP  Min: 100/83  Max: 156/108   Pulse  Min: 63  Max: 119   Resp  Min: 16  Max: 18         General: Well Developed, Well Nourished, not in acute distress   HEENT: normocephalic, atraumatic, conjunctiva non-icteric    Respiratory: non-labored, symmetrical chest rise   Cardiovascular: regular rate, no JVD  Gastrointestinal: gravid, soft, no TTP, no rebound tenderness or guarding to palpation, no palpable ctx   Neurologic: alert and oriented, CN II-XII grossly intact without focal deficit, DTRs 2+ lower extremities, no clonus   Psychiatric: normal mood, pleasant, cooperative  Musculoskeletal: negative calf tenderness, no lower extremity edema  Skin: warm & dry, no cyanosis, no jaundice     Pelvic Exam:  deferred     Fetal Monitoring  FHT's: Reactive and reassuring   Contractions: Irregular contractions overnight            Preelampsia Labs:           Results from last 7 days   Lab Units 24  1350 24  1224   WBC 10*3/mm3  --  8.27   HEMOGLOBIN g/dL  --  9.8*   HEMATOCRIT %  --  31.3*   PLATELETS 10*3/mm3  --  207   POTASSIUM mmol/L  --  3.3*   ALT (SGPT) U/L  --  5   AST (SGOT) U/L  --  11   CREATININE mg/dL  --  0.68   BILIRUBIN mg/dL  --  0.6   LDH U/L  --  151   URIC ACID mg/dL  --  6.2*   PROT/CREAT RATIO UR mg/G Crea 119.5  --                Assessment  IUP at 35w4d  Chronic hypertension  Maternal anemia           Plan  1.   Now s/p observation and  24-hour urine protein. Urine protein <300. Blood pressures stable on labetalol. Stable for outpatient management. Preeclampsia precautions and labor precautions reviewed. Follow-up next week as scheduled. Questions answered. She expressed understanding.      Jason Rodriguez MD  6/15/2024  08:06 CDT                                 Jason Rodriguez MD   Physician  Obstetrics     Discharge Summary     Addendum     Date of Service: 06/15/24 0809  Creation Time: 06/15/24 0809     Expand All Collapse All[]Expand All by Default    [facility]     Jason Rodriguez MD  [address]  [place] [address]  [phone]        Discharge Summary        Carmelita Powers  :   2000  MRN:   [id number]  CSN:    [id number]     Date of Admission: 2024   Date of Discharge:  6/15/2024   Delivering Physician: This patient has no babies on file.         Admission Diagnosis: Preexisting hypertension complicating pregnancy, antepartum, third trimester [O10.913]      Discharge Diagnosis: Pregnancy at 35w4d  Chronic hypertension in pregnancy, now on antihypertensives                  Presenting Problem/History of Present Illness        Active Hospital Problems     Diagnosis   POA    **Preexisting hypertension complicating pregnancy, antepartum, third trimester [O10.913]   Yes       Resolved Hospital Problems   No resolved problems to display.                    Hospital Course  Patient is a 24 y.o.  who was admitted on 2024 for blood pressure monitoring. Blood pressures had been elevated in clinic. She was started on labetalol and a 24-hour urine protein was collected. Blood pressures improved significantly. Labs not congruent with preeclampsia. Fetus reactive. She was stable for outpatient management. .     Procedures Performed  none     Consults:   Consults         No orders found for last 30 day(s).                Pertinent Test Results:   CBC Results   CBC            2024    13:24  2024    12:40 2024    12:24   CBC   WBC 9.5  8.9  8.27    RBC 3.98  4.15  4.12    Hemoglobin 10.3     9.9  10.0  9.8    Hematocrit 31.3  31.9  31.3    MCV 79  77  76.0    MCH 24.9  24.1  23.8    MCHC 31.6  31.3  31.3    RDW 13.3  13.1  13.3    Platelets 203  254  207             Condition on Discharge:  Stable     Vital Signs  Temp:  [97.4 °F (36.3 °C)-98.6 °F (37 °C)] 98.6 °F (37 °C)  Heart Rate:  [] 90  Resp:  [16-18] 16  BP: (100-156)/() 107/72     Physical Exam:              See day of discharge progress note for exam        Result Review  CBC Results   CBC            2024    13:24 2024    12:40 2024    12:24   CBC   WBC 9.5  8.9  8.27    RBC 3.98  4.15  4.12    Hemoglobin 10.3     9.9  10.0  9.8    Hematocrit 31.3  31.9  31.3    MCV 79  77  76.0    MCH 24.9  24.1  23.8    MCHC 31.6  31.3  31.3    RDW 13.3  13.1  13.3    Platelets 203  254  207                     Postpartum Depression: High Risk (2024)     [place]  Depression Scale      Last EPDS Total Score: 13      Last EPDS Self Harm Result: Not on file               Discharge Disposition  Home or Self Care     Discharge Medications      Discharge Medications          New Medications         Instructions Start Date   ACCRUFeR 30 MG capsule  Generic drug: Ferric Maltol    30 mg, Oral, 2 Times Daily        ACCRUFeR 30 MG capsule  Generic drug: Ferric Maltol    30 mg, Oral, 2 Times Daily        labetalol 200 MG tablet  Commonly known as: NORMODYNE    200 mg, Oral, Every 12 Hours Scheduled                  Continue These Medications         Instructions Start Date   aspirin 81 MG EC tablet    81 mg, Oral, Daily        metoclopramide 10 MG tablet  Commonly known as: Reglan    10 mg, Oral, 4 Times Daily        omeprazole 20 MG capsule  Commonly known as: priLOSEC    20 mg, Oral, Daily        ondansetron ODT 4 MG disintegrating tablet  Commonly known as: ZOFRAN-ODT    4 mg, Translingual, Every 8 Hours PRN         PNV Prenatal Plus Multivit+DHA 27-1 & 312 MG misc    1 tablet, Oral, Daily                     Discharge Diet: home diet     Activity at Discharge: regular activity      Follow-up Appointments         Future Appointments   Date Time Provider Department Center   6/18/2024  9:30 AM [facility] [address] 2 [facility]   6/18/2024 10:30 AM Ally Ocampo MD [facility]   6/26/2024  1:45 PM Ally Ocampo MD [facility]   7/3/2024  1:00 PM Swati Gallo APRN [facility]         Follow-up for OB visit in 1 week.      Test Results Pending at Discharge: none        Jason Rodriguez MD  06/15/24  08:09 CDT     Time: Discharge <30 min                    Revision History       6/15         6/14   b/p 154/102    156/108         Pt sent from office with elevated BP; Labetalol 200 PO q 12; NST q 12 hrs; 24 hour urine in progress until 1345 on 6/15/24             Nutrition Notes (last 24 hours)  Notes from 06/27/24 1457 through 06/28/24 1457   No notes exist for this encounter.

## 2024-06-28 NOTE — PROGRESS NOTES
"      KIERRA Caballero  [facility]  [address]  [place] [address]  Office [phone]  Fax [phone]    [facility]  Vaginal Delivery Progress Note    Subjective   Postpartum Day 2: Vaginal Delivery    The patient feels well.  Her pain is well controlled with Motrin.   She is ambulating well.  Patient describes her bleeding as thin lochia.    Breastfeeding: declines.    Objective     Vital Signs Range for the last 24 hours  Temperature: Temp:  [97.8 °F (36.6 °C)-98.5 °F (36.9 °C)] 98.4 °F (36.9 °C)   Temp Source: Temp src: Temporal   BP: BP: (123-157)/() 144/85   Pulse: Heart Rate:  [77-89] 86   Respirations: Resp:  [16-18] 18   SPO2: SpO2:  [98 %-100 %] 100 %   O2 Amount (l/min):     O2 Devices Device (Oxygen Therapy): room air   Weight:       Admit Height:  Height: 162.6 cm (64\")      Physical Exam:  General:  no acute distresss.  Abdomen: Abdomen soft, without significant tenderness  Extremities: normal, atraumatic, no cyanosis, and trace edema.       Lab results reviewed:  Yes   Rubella:  No results found for: \"RUBELLAIGGIN\" Nurse Transcribed from prenatal record --  No components found for: \"EXTRUBELQUAL\"  Rh Status:    RH type   Date Value Ref Range Status   06/26/2024 Positive  Final     Comment:     PRIOR ABORH RECORDS NOT AVAILABLE FOR VERIFICATION     Immunizations:   Immunization History   Administered Date(s) Administered    COVID-19 F23 (PFIZER) 12YRS+ (COMIRNATY) 10/18/2023    DTaP 2002, 03/03/2003, 09/04/2003, 06/22/2006    Fluzone (or Fluarix & Flulaval for VFC) >6mos 10/18/2023    Hep A, 2 Dose 03/02/2018, 10/18/2018    Hep B / HiB 2002, 2002, 05/15/2003    Hep B, Adolescent or Pediatric 2002, 2002, 05/15/2003    HiB 2002, 2002, 05/15/2003    Hpv9 03/02/2018, 10/18/2018    IPV 2002, 2002, 05/15/2003, 06/22/2006    MMR 09/04/2003, 06/22/2006    Meningococcal MCV4P (Menactra) 10/18/2018    Meningococcal, Unspecified " "08/04/2014, 10/18/2018    Pneumococcal Conjugate 13-Valent (PCV13) 2002, 03/03/2003, 09/04/2003    Tdap 08/04/2014, 05/01/2024    Varicella 05/15/2003, 08/04/2014     Lab Results (last 24 hours)       Procedure Component Value Units Date/Time    Tissue Pathology Exam [[id number]] Collected: 06/26/24 0901    Specimen: Tissue from Placenta Updated: 06/27/24 1151     Note to Patients --     This report may contain a detailed description of human tissue sent by a health care provider to the laboratory for pathologic evaluation. The content of this report is essential for diagnosis and may provide important critical findings. This information may be unfamiliar to patients to review without a medical professional present. It is advised that the patient review this report in the presence of a health care provider who can answer questions and explain the results.         Case Report --     Surgical Pathology Report                         Case: [id number]                                  Authorizing Provider:  Yesica Key MD         Collected:           06/26/2024 09:01 AM          Ordering Location:     [facility]     Received:            06/26/2024 03:25 PM                                 LABOR DELIVERY                                                               Pathologist:           Ari Greenfield MD                                                      Specimen:    Placenta                                                                                    Final Diagnosis --     Placenta, lua, spontaneous vaginal delivery:  Three-vessel umbilical cord.  Unremarkable fetal membranes.  Subserosal lamellar fibrin deposits.  Late third trimester chorionic villi.  Placenta weight of 527 g.       Gross Description --     1. Placenta.  Specimen #1 is received in formalin and labeled with the patient's name, medical record number, accession number and designated \"placenta\". The specimen " consists of a round/oval lua placental disc with attached membranes and umbilical cord. The placental disc measures 16.1 x 15.4 x 3.8 cm. The membranes and umbilical cord are removed and the disc weighs 527 g. The maternal surface is red-brown and intact. Sectioning reveals red-brown, soft and spongy parenchyma with no fibrous deposition or intraparenchymal hemorrhage identified. The fetal surface is blue-gray with no significant discoloration. Subchorionic fibrin comprises less than 5% of the surface. The umbilical cord inserts eccentrically, 3.1 cm from the margin.  The cord measures 18.5 cm in length by 1.3 cm in diameter. The external surface is blue-gray with no significant discoloration. Sectioning reveals three patent vessels with no stricture or thrombus identified. The membranes insert at the margin and are tan-pink and translucent.    Representative section of peripheral disc is submitted in (block 1A). Representative section of central disc is submitted in (block 1B). Representative sections of cord and membranes are submitted in (block 1C).         Microscopic Description --     The three-vessel umbilical cord is unremarkable.  The fetal membranes are unremarkable.  The fetal surface shows subserosal lamellar fibrin deposits.  The sectioned maternal surface shows late third trimester chronic villi.              External Prenatal Results       Pregnancy Outside Results - Transcribed From Office Records - See Scanned Records For Details       Test Value Date Time    ABO  B  06/26/24 0018    Rh  Positive  06/26/24 0018    Antibody Screen  Negative  06/26/24 0018      ^ Normal  12/18/23     Varicella IgG       Rubella ^ 2.28  12/18/23     Hgb  8.9 g/dL 06/27/24 0532       9.6 g/dL 06/26/24 0018       9.8 g/dL 06/14/24 1224       10.0 g/dL 05/30/24 1240       9.9 g/dL 04/18/24 1324       10.3 g/dL 04/18/24 1324       10.9 g/dL 03/28/24 0747      ^ 10.4 g/dL 12/18/23     Hct  28.4 % 06/27/24 0532        30.5 % 06/26/24 0018       31.3 % 06/14/24 1224       31.9 % 05/30/24 1240       31.3 % 04/18/24 1324       34.0 % 03/28/24 0747      ^ 31 % 12/18/23     HgB A1c        1h GTT  109 mg/dL 04/18/24 1324    3h GTT Fasting       3h GTT 1 hour       3h GTT 2 hour       3h GTT 3 hour        Gonorrhea (discrete)  Negative  06/18/24 1005       Negative  06/14/24 1318       Negative  05/16/24 1334      ^ positive  11/30/23     Chlamydia (discrete)  Negative  06/18/24 1005       Negative  06/14/24 1318       Negative  05/16/24 1334      ^ positive  11/30/23     RPR  Non Reactive  04/18/24 1324      ^ Non-Reactive  12/18/23     Syphilis Antibody       HBsAg ^ Negative  12/18/23     Herpes Simplex Virus PCR       Herpes Simplex VIrus Culture       HIV ^ non-reactive  12/18/23     Hep C RNA Quant PCR       Hep C Antibody ^ negative  12/18/23     AFP       NIPT       Cystic Fibroisis        Group B Strep  Negative  06/18/24 1005    GBS Susceptibility to Clindamycin       GBS Susceptibility to Erythromycin       Fetal Fibronectin       Genetic Testing, Maternal Blood                 Drug Screening       Test Value Date Time    Urine Drug Screen       Amphetamine Screen       Barbiturate Screen       Benzodiazepine Screen       Methadone Screen       Phencyclidine Screen       Opiates Screen       THC Screen       Cocaine Screen       Propoxyphene Screen       Buprenorphine Screen       Methamphetamine Screen       Oxycodone Screen       Tricyclic Antidepressants Screen                 Legend    ^: Historical                            Assessment & Plan       Pregnancy      Carmelita Powers is Day 2  post-partum  Vaginal, Spontaneous   .      Plan:  Continue postpartum plan of care. Discharge instructions provided. Questions answered and understanding verbalized. Return to clinic within 1 week for blood pressure check.       This note has been signed electronically.    Swati Gallo, DNP, APRN, CNM, RNC-OB  6/28/2024  08:05  CDT

## 2024-07-01 ENCOUNTER — TELEPHONE (OUTPATIENT)
Dept: OBSTETRICS AND GYNECOLOGY | Age: 24
End: 2024-07-01

## 2024-07-01 NOTE — TELEPHONE ENCOUNTER
Caller: Carmelita Powers    Relationship: Self    Best call back number: [phone] / LVM    What form or medical record are you requesting: FMLA    Who is requesting this form or medical record from you: EMPLOYER    How would you like to receive the form or medical records (pick-up, mail, fax): FAX  If fax, what is the fax number: ON PAPERWORK    Timeframe paperwork needed: TODAY 07/01/24     Additional notes: PT DELIVERED ON 06/26/24 - PT STATES THAT SHE GAVE KIERRA BARNHART HER FMLA PAPERWORK ON 06/28/24 AT THE HOSPITAL TO HAVE FILLED OUT AND SENT TO HER EMPLOYER - PT IS WANTING TO WHAT THE STATUS IS ON THAT PAPERWORK - IT NEEDS TO BE TURNED INTO TODAY - HER EMPLOYER CALLED HER AND SAID THAT THEY NEED IT TODAY - FAX # IS ON THE PAPER    PLEASE CALL THE PT TO CONFIRM    THANK YOU!

## 2024-07-02 NOTE — TELEPHONE ENCOUNTER
I will give patient a call today to let her know the forms were just given to us a couple days ago & there is a process on filling these out. Also the patient needs to sign a [place] consent & fee before we can release them. Thanks.

## 2024-07-02 NOTE — TELEPHONE ENCOUNTER
Pt called requesting an update on her FMLA paperwork. Pt advised that she will need to come in office to sign the FMLA consent. Pt voiced understanding

## 2024-07-09 ENCOUNTER — PATIENT OUTREACH (OUTPATIENT)
Dept: LABOR AND DELIVERY | Facility: HOSPITAL | Age: 24
End: 2024-07-09
Payer: COMMERCIAL

## 2024-07-09 NOTE — OUTREACH NOTE
Motherhood Connection  Postpartum Check-In    Questions/Answers      Flowsheet Row Responses   Visit Setting Telephone   Best Method for Contacting Cell   OB Discharge Note Reviewed  Reviewed   OB Discharge Navigator Reviewed  Reviewed   OB Discharge Medications Reviewed  Reviewed    discharged home with mother? Yes   Current Pain Levels 0-10 0   At Rest Pain Levels 0-10 0   Pain level with activity 0-10 0   Acceptable Pain Level 0-10 3   Verbalized Emotional State Acceptance   Family/Support Network Family   Level of Involvement in Care Attentive, Interactive, Supportive   Do you feel comfortable in your relationship with your baby? Yes   Have members of your household adjusted to your baby? Yes   Is the baby's father supportive and/or involved with the baby? Yes   How does your partner feel about the baby? Happy, Involved   Do you feel safe at home, school and work? Yes   Are you in a relationship with someone who threatens you or hurts you? No   Do you have the resources to keep yourself and your baby healthy and safe? Yes   Lochia (per patient report) Rubra   Amount Scant   Number of pads per day 3   Lochia Odor None   Is patient breastfeeding? No   Postpartum Depression Screening Education Education Provided   Doctor Appointments: Education Provided   Postpartum Care Education Education Provided   S & S to report Education Provided   Followup Appointments Made No  [encouraged to call and schedule a 4-6 week postpartum appt]   Well Child Visit Appointments Made Yes   Well Child Checkup Provider Name Molina   Well Child Check Up Date: 07/10/24   Umbilical Cord No reported signs or symptoms   Feeding Readiness Cues: Cooing, Crying, Eager, Energy for feeding, Finger Sucking   Infant Feeding Method Formula   Formula PO (mL) 2oz   Formula/Expressed Milk frequency of feedings: every 3-4 hours   Number of wet diapers x 24 hours 10   Last BM x 24 hours 2   Emesis (Unmeasured Occurence) scant   What safe sleep  surface is available? Bassinet   Are there stuffed animals, toys, pillows, quilts, blankets, wedges, positioners, bumpers or other loose bedding in the infant's sleeping environment? No   Where does the baby usually sleep? Bassinet   Does the baby ever share a sleep surface with a sibling, adult or pet? No   Does the baby ever share a sleep surface in a bed, couch, recliner or other? No   What position do you place your baby to sleep for naps? Back   What position do you place your baby to sleep at night Back            Review of Systems    Most Recent [place]  Depression Scale Score (EPDS)    Performed by a clinician: 0 (2024  2:49 PM)      5 Ps Screen  completed    Carmelita is aware that the RN nurse call center will call in 1-2 weeks.     Lorene Márquez RN  Maternity Nurse Navigator    2024, 14:55 CDT

## 2024-07-10 ENCOUNTER — TELEPHONE (OUTPATIENT)
Dept: OBSTETRICS AND GYNECOLOGY | Age: 24
End: 2024-07-10
Payer: COMMERCIAL

## 2024-07-12 ENCOUNTER — TELEPHONE (OUTPATIENT)
Dept: OBSTETRICS AND GYNECOLOGY | Age: 24
End: 2024-07-12
Payer: COMMERCIAL

## 2024-07-15 ENCOUNTER — POSTPARTUM VISIT (OUTPATIENT)
Age: 24
End: 2024-07-15
Payer: COMMERCIAL

## 2024-07-15 VITALS
HEIGHT: 64 IN | WEIGHT: 207 LBS | BODY MASS INDEX: 35.34 KG/M2 | DIASTOLIC BLOOD PRESSURE: 92 MMHG | SYSTOLIC BLOOD PRESSURE: 130 MMHG

## 2024-07-15 PROCEDURE — 0503F POSTPARTUM CARE VISIT: CPT

## 2024-07-15 NOTE — PROGRESS NOTES
Subjective   No chief complaint on file.    Carmelita Powers is a 24 y.o. year old  presenting to be seen for her postpartum visit.  She had a vaginal delivery.   Prenatal course was  complicated by chronic hypertension (on meds).    Since delivery she has not been sexually active.  She does not have concerns about post-partum blues/depression.   She is bottle feeding.  States she is no longer having any vaginal bleeding from delivery.     The following portions of the patient's history were reviewed and updated as appropriate:problem list, current medications, and allergies    Social History     Socioeconomic History    Marital status: Single   Tobacco Use    Smoking status: Never     Passive exposure: Never    Smokeless tobacco: Never   Vaping Use    Vaping status: Never Used   Substance and Sexual Activity    Alcohol use: Not Currently    Drug use: Never    Sexual activity: Not Currently     Partners: Male     Birth control/protection: None     Review of Systems   Respiratory: Negative.     Cardiovascular: Negative.    Gastrointestinal: Negative.    Genitourinary: Negative.    Neurological: Negative.    Psychiatric/Behavioral: Negative.           Objective   LMP 10/10/2023 (Exact Date)     General:  well developed; well nourished  no acute distress   Abdomen: Not performed.   Pelvis: Not performed.       Diagnoses and all orders for this visit:    1. Postpartum hypertension (Primary)  Remains on labetalol 200 mg and HCTZ 25 mg po daily. Discussed warning s/s of high BP and/or preeclampsia and when to seek medical attention.    2. Postpartum state  --Continue resting when possible. Napping when the baby does can help make up for some of the sleep you're missing at night.  --If you begin having trouble with anxiety, feeling overwhelmed, depressed, trouble caring for yourself or your baby-- please make an appointment to be seen. If struggling with thoughts of hurting yourself or others, please go to the  nearest ER for help.   --Allow others to help with household duties as much as possible. Cooking, cleaning, and caring for older children can be done by others if they are available and willing. Healing and caring for your infant are your primary responsibilities in the early postpartum period.        Follow up with Dr Ocampo at 6 weeks postpartum or sooner if having concerns.    This note was electronically signed.  CLIFF Wagoner  July 14, 2024

## 2024-07-16 ENCOUNTER — PATIENT OUTREACH (OUTPATIENT)
Dept: CALL CENTER | Facility: HOSPITAL | Age: 24
End: 2024-07-16
Payer: COMMERCIAL

## 2024-07-16 NOTE — OUTREACH NOTE
Motherhood Connection Survey      Flowsheet Row Responses   [facility] patient discharged from? [place]   Week 1 attempt successful? Yes   Call start time 1623   Call end time 1630   Baby sex Girl    discharged home with mother? Yes   Baby sex Girl   Delivery type Vaginal   Emotional state Acceptance   Family support Yes   Do you have all necessary resources to care for you and your baby?  Yes   Have members of your household adjusted to your baby? Yes   Did you have any problems with pre-eclampsia during this pregnancy? Yes   Do you have any of the following: No Issues   Did you have blood glucose issues during this pregnancy No   Lochia amount None   Additional comments vaginal   Feeding Method Bottle   Frequency 4 ounces every three hours   Breast Condition No   Nipple Condition No   Signs baby is ready to eat Rooting, Crying   Feeding tolerance Adequate pause for breath, Wet/dirty diapers, Weight gain   Number of wet diapers x 24 hours Every 3 hours with wet diapers   Last BM x 24 hours 4   Umbilical Cord No reported signs or symptoms   Umbilical cord comments Cord has fallen off   Circumcision comments n/a, female   Where does the baby usually sleep? Bassinet   Are there stuffed animals, toys, pillows, quilts, blankets, wedges, positioners, bumpers or other loose bedding in the infant's sleeping environment? No   Does the baby ever share a sleep surface in a bed, couch, recliner or other? No   What position do you lay your baby down to sleep? Back   Are you and/or other caregivers smoking inside or outside the baby's home? No   Mom appointment comments: Mom had her followup yesterday.   Baby appointment comments: Baby has been to all followups   Call completed? Yes   How satisfied were you with the Motherhood Connection Program? 5              Gadiel ROMANO - Registered Nurse

## 2024-08-06 ENCOUNTER — TELEPHONE (OUTPATIENT)
Age: 24
End: 2024-08-06

## 2024-08-06 NOTE — TELEPHONE ENCOUNTER
Provider: DR. BUSTAMANTE    Caller: DIOR FLORES    Relationship to Patient: SELF    Pharmacy:     Phone Number: [phone]    Reason for Call: NOTE FOR WORK    When was the patient last seen: 07.15.24    PATIENT NEEDS A NOTE FOR WORK STATING WHEN HER NEXT APPOINTMENT IS. PATIENT IS SCHEDULED ON 08.19.24 @ 01:30 PM WITH DR. BUSTAMANTE.     PATIENT WOULD LIKE THE NOTE FAXED [phone]    PATIENT CAN BE REACHED [phone]    THANK YOU

## 2024-08-18 ENCOUNTER — HOSPITAL ENCOUNTER (EMERGENCY)
Age: 24
Discharge: HOME | End: 2024-08-18
Attending: STUDENT IN AN ORGANIZED HEALTH CARE EDUCATION/TRAINING PROGRAM
Payer: COMMERCIAL

## 2024-08-18 VITALS
OXYGEN SATURATION: 100 % | RESPIRATION RATE: 18 BRPM | TEMPERATURE: 98.3 F | HEIGHT: 64 IN | HEART RATE: 65 BPM | SYSTOLIC BLOOD PRESSURE: 156 MMHG | DIASTOLIC BLOOD PRESSURE: 87 MMHG | WEIGHT: 206 LBS | BODY MASS INDEX: 35.17 KG/M2

## 2024-08-18 DIAGNOSIS — A08.4 VIRAL GASTROENTERITIS: ICD-10-CM

## 2024-08-18 DIAGNOSIS — R11.2 NAUSEA AND VOMITING, UNSPECIFIED VOMITING TYPE: Primary | ICD-10-CM

## 2024-08-18 LAB
ALBUMIN SERPL-MCNC: 4.1 G/DL (ref 3.5–5.2)
ALP SERPL-CCNC: 70 U/L (ref 35–104)
ALT SERPL-CCNC: 7 U/L (ref 5–33)
ANION GAP SERPL CALCULATED.3IONS-SCNC: 12 MMOL/L (ref 7–19)
AST SERPL-CCNC: 13 U/L (ref 5–32)
BACTERIA URNS QL MICRO: ABNORMAL /HPF
BASOPHILS # BLD: 0 K/UL (ref 0–0.2)
BASOPHILS NFR BLD: 0.1 % (ref 0–1)
BILIRUB SERPL-MCNC: 0.6 MG/DL (ref 0.2–1.2)
BILIRUB UR QL STRIP: NEGATIVE
BUN SERPL-MCNC: 12 MG/DL (ref 6–20)
CALCIUM SERPL-MCNC: 9.6 MG/DL (ref 8.6–10)
CHLORIDE SERPL-SCNC: 103 MMOL/L (ref 98–111)
CLARITY UR: ABNORMAL
CO2 SERPL-SCNC: 23 MMOL/L (ref 22–29)
COLOR UR: YELLOW
CREAT SERPL-MCNC: 0.7 MG/DL (ref 0.5–0.9)
CRYSTALS URNS MICRO: ABNORMAL /HPF
EOSINOPHIL # BLD: 0.2 K/UL (ref 0–0.6)
EOSINOPHIL NFR BLD: 1.8 % (ref 0–5)
EPI CELLS #/AREA URNS AUTO: 3 /HPF (ref 0–5)
ERYTHROCYTE [DISTWIDTH] IN BLOOD BY AUTOMATED COUNT: 16.6 % (ref 11.5–14.5)
GLUCOSE SERPL-MCNC: 96 MG/DL (ref 74–109)
GLUCOSE UR STRIP.AUTO-MCNC: NEGATIVE MG/DL
HCG SERPL QL: NEGATIVE
HCT VFR BLD AUTO: 38.5 % (ref 37–47)
HGB BLD-MCNC: 11.5 G/DL (ref 12–16)
HGB UR STRIP.AUTO-MCNC: NEGATIVE MG/L
HYALINE CASTS #/AREA URNS AUTO: 4 /HPF (ref 0–8)
IMM GRANULOCYTES # BLD: 0 K/UL
KETONES UR STRIP.AUTO-MCNC: 15 MG/DL
LEUKOCYTE ESTERASE UR QL STRIP.AUTO: ABNORMAL
LIPASE SERPL-CCNC: 31 U/L (ref 13–60)
LYMPHOCYTES # BLD: 1.3 K/UL (ref 1.1–4.5)
LYMPHOCYTES NFR BLD: 15 % (ref 20–40)
MCH RBC QN AUTO: 23.7 PG (ref 27–31)
MCHC RBC AUTO-ENTMCNC: 29.9 G/DL (ref 33–37)
MCV RBC AUTO: 79.2 FL (ref 81–99)
MONOCYTES # BLD: 0.5 K/UL (ref 0–0.9)
MONOCYTES NFR BLD: 5.4 % (ref 0–10)
NEUTROPHILS # BLD: 6.8 K/UL (ref 1.5–7.5)
NEUTS SEG NFR BLD: 77.5 % (ref 50–65)
NITRITE UR QL STRIP.AUTO: NEGATIVE
PH UR STRIP.AUTO: 5.5 [PH] (ref 5–8)
PLATELET # BLD AUTO: 228 K/UL (ref 130–400)
PMV BLD AUTO: 10.7 FL (ref 9.4–12.3)
POTASSIUM SERPL-SCNC: 4.4 MMOL/L (ref 3.5–5)
PROT SERPL-MCNC: 7 G/DL (ref 6.6–8.7)
PROT UR STRIP.AUTO-MCNC: ABNORMAL MG/DL
RBC # BLD AUTO: 4.86 M/UL (ref 4.2–5.4)
RBC #/AREA URNS AUTO: 1 /HPF (ref 0–4)
SARS-COV-2 RDRP RESP QL NAA+PROBE: NOT DETECTED
SODIUM SERPL-SCNC: 138 MMOL/L (ref 136–145)
SP GR UR STRIP.AUTO: 1.02 (ref 1–1.03)
UROBILINOGEN UR STRIP.AUTO-MCNC: 0.2 E.U./DL
WBC # BLD AUTO: 8.8 K/UL (ref 4.8–10.8)
WBC #/AREA URNS AUTO: 33 /HPF (ref 0–5)

## 2024-08-18 PROCEDURE — 84703 CHORIONIC GONADOTROPIN ASSAY: CPT

## 2024-08-18 PROCEDURE — 87086 URINE CULTURE/COLONY COUNT: CPT

## 2024-08-18 PROCEDURE — 81001 URINALYSIS AUTO W/SCOPE: CPT

## 2024-08-18 PROCEDURE — 96361 HYDRATE IV INFUSION ADD-ON: CPT

## 2024-08-18 PROCEDURE — 99284 EMERGENCY DEPT VISIT MOD MDM: CPT

## 2024-08-18 PROCEDURE — 80053 COMPREHEN METABOLIC PANEL: CPT

## 2024-08-18 PROCEDURE — 83690 ASSAY OF LIPASE: CPT

## 2024-08-18 PROCEDURE — 2580000003 HC RX 258: Performed by: STUDENT IN AN ORGANIZED HEALTH CARE EDUCATION/TRAINING PROGRAM

## 2024-08-18 PROCEDURE — 96375 TX/PRO/DX INJ NEW DRUG ADDON: CPT

## 2024-08-18 PROCEDURE — 99283 EMERGENCY DEPT VISIT LOW MDM: CPT

## 2024-08-18 PROCEDURE — 36415 COLL VENOUS BLD VENIPUNCTURE: CPT

## 2024-08-18 PROCEDURE — 96374 THER/PROPH/DIAG INJ IV PUSH: CPT

## 2024-08-18 PROCEDURE — 87635 SARS-COV-2 COVID-19 AMP PRB: CPT

## 2024-08-18 PROCEDURE — 6360000002 HC RX W HCPCS: Performed by: STUDENT IN AN ORGANIZED HEALTH CARE EDUCATION/TRAINING PROGRAM

## 2024-08-18 PROCEDURE — 85025 COMPLETE CBC W/AUTO DIFF WBC: CPT

## 2024-08-18 RX ORDER — ONDANSETRON 2 MG/ML
4 INJECTION INTRAMUSCULAR; INTRAVENOUS ONCE
Status: COMPLETED | OUTPATIENT
Start: 2024-08-18 | End: 2024-08-18

## 2024-08-18 RX ORDER — KETOROLAC TROMETHAMINE 30 MG/ML
30 INJECTION, SOLUTION INTRAMUSCULAR; INTRAVENOUS ONCE
Status: COMPLETED | OUTPATIENT
Start: 2024-08-18 | End: 2024-08-18

## 2024-08-18 RX ORDER — ONDANSETRON 4 MG/1
4 TABLET, ORALLY DISINTEGRATING ORAL 3 TIMES DAILY PRN
Qty: 21 TABLET | Refills: 0 | Status: SHIPPED | OUTPATIENT
Start: 2024-08-18

## 2024-08-18 RX ORDER — 0.9 % SODIUM CHLORIDE 0.9 %
1000 INTRAVENOUS SOLUTION INTRAVENOUS ONCE
Status: COMPLETED | OUTPATIENT
Start: 2024-08-18 | End: 2024-08-18

## 2024-08-18 RX ORDER — METOCLOPRAMIDE HYDROCHLORIDE 5 MG/ML
5 INJECTION INTRAMUSCULAR; INTRAVENOUS ONCE
Status: COMPLETED | OUTPATIENT
Start: 2024-08-18 | End: 2024-08-18

## 2024-08-18 RX ADMIN — ONDANSETRON 4 MG: 2 INJECTION, SOLUTION INTRAMUSCULAR; INTRAVENOUS at 21:40

## 2024-08-18 RX ADMIN — METOCLOPRAMIDE 5 MG: 5 INJECTION, SOLUTION INTRAMUSCULAR; INTRAVENOUS at 23:09

## 2024-08-18 RX ADMIN — SODIUM CHLORIDE 1000 ML: 9 INJECTION, SOLUTION INTRAVENOUS at 21:43

## 2024-08-18 RX ADMIN — KETOROLAC TROMETHAMINE 30 MG: 30 INJECTION, SOLUTION INTRAMUSCULAR at 23:10

## 2024-08-18 ASSESSMENT — PAIN - FUNCTIONAL ASSESSMENT: PAIN_FUNCTIONAL_ASSESSMENT: 0-10

## 2024-08-18 ASSESSMENT — PAIN SCALES - GENERAL: PAINLEVEL_OUTOF10: 5

## 2024-08-18 ASSESSMENT — PAIN DESCRIPTION - LOCATION: LOCATION: ABDOMEN

## 2024-08-18 ASSESSMENT — PAIN DESCRIPTION - ORIENTATION: ORIENTATION: MID

## 2024-08-19 ENCOUNTER — POSTPARTUM VISIT (OUTPATIENT)
Age: 24
End: 2024-08-19
Payer: COMMERCIAL

## 2024-08-19 VITALS
WEIGHT: 212.5 LBS | DIASTOLIC BLOOD PRESSURE: 82 MMHG | SYSTOLIC BLOOD PRESSURE: 118 MMHG | BODY MASS INDEX: 36.28 KG/M2 | HEIGHT: 64 IN

## 2024-08-19 DIAGNOSIS — Z30.8 ENCOUNTER FOR OTHER CONTRACEPTIVE MANAGEMENT: ICD-10-CM

## 2024-08-19 DIAGNOSIS — Z12.4 ENCOUNTER FOR SCREENING FOR CERVICAL CANCER: ICD-10-CM

## 2024-08-19 PROCEDURE — G0123 SCREEN CERV/VAG THIN LAYER: HCPCS | Performed by: OBSTETRICS & GYNECOLOGY

## 2024-08-19 PROCEDURE — 0503F POSTPARTUM CARE VISIT: CPT | Performed by: OBSTETRICS & GYNECOLOGY

## 2024-08-19 NOTE — ED PROVIDER NOTES
[facility] EMERGENCY DEPT  eMERGENCY dEPARTMENT eNCOUnter      Pt Name: Luiza Degroot  MRN: [id number]  Birthdate 2000  Date of evaluation: 8/18/2024  Provider: Dahlia Cheng MD    CHIEF COMPLAINT       Chief Complaint   Patient presents with    Emesis     Pt stated she has been having N/V/D all day          HISTORY OF PRESENT ILLNESS   (Location/Symptom, Timing/Onset,Context/Setting, Quality, Duration, Modifying Factors, Severity)  Note limiting factors.     HPI    Luiza Degroot is a 24 y.o. female who presents to the emergency department with CC of nausea, vomiting, diarrhea for the last 24 hours.  Reports watery, nonbloody diarrhea.  Reports associated crampy generalized abdominal pain.  No fevers, chills, cough, congestion, shortness of breath.      NursingNotes were reviewed.    REVIEW OF SYSTEMS    (2-9 systems for level 4, 10 or more for level 5)     Review of Systems   Constitutional:  Negative for chills, fatigue and fever.   HENT:  Negative for congestion and sore throat.    Eyes:  Negative for pain and redness.   Respiratory:  Negative for cough, chest tightness and shortness of breath.    Cardiovascular:  Negative for chest pain and leg swelling.   Gastrointestinal:  Positive for abdominal pain, diarrhea, nausea and vomiting.   Genitourinary:  Negative for dysuria and urgency.   Musculoskeletal:  Negative for neck pain and neck stiffness.   Skin:  Negative for rash and wound.   Neurological:  Negative for seizures, syncope and headaches.   Psychiatric/Behavioral:  Negative for agitation and confusion.             PAST MEDICALHISTORY   History reviewed. No pertinent past medical history.      SURGICAL HISTORY     History reviewed. No pertinent surgical history.      CURRENT MEDICATIONS     Discharge Medication List as of 8/18/2024 11:33 PM        CONTINUE these medications which have NOT CHANGED    Details   LABETALOL HCL PO Take by mouthHistorical Med             ALLERGIES     Patient has no

## 2024-08-19 NOTE — PROGRESS NOTES
"Chief Complaint  Postpartum Care (Pt here for 6 wk PP visit, vaginal delivery 2024, no complications, needs pap today, hasn't had one. Denies any problems, request implanon for birth control.)    Subjective        Carmelita Powers presents to [facility] for postpartum visit. Pt doing well. First menses 8/15.  Bottle feeding. Would like a nexplanon.  Denies pp depression. Baby doing well. Needs pap smear. Reports normal bowel and bladder function.   History of Present Illness    Objective   Vital Signs:  /82   Ht 162.6 cm (64.02\")   Wt 96.4 kg (212 lb 8 oz)   BMI 36.46 kg/m²   Estimated body mass index is 36.46 kg/m² as calculated from the following:    Height as of this encounter: 162.6 cm (64.02\").    Weight as of this encounter: 96.4 kg (212 lb 8 oz).          Physical Exam   Normal external genitalia, cervix 2x2 cm without lesions, pap smear done, vaginal mucosa without lesions, bimanual exam NT/no masses  Result Review :                Assessment and Plan   Diagnoses and all orders for this visit:    1. Postpartum care following vaginal delivery (Primary)  25 y/o AAF s/p , doing well. Resume all normal activities. Denies pp depression. Pap smear today. RTC yearly   2. Encounter for other contraceptive management  Discussed contraceptive options. OCPs until nexplanon placed. RTC for insertion.   Other orders  -     norethindrone-ethinyl estradiol-ferrous fumarate (LOESTIN 24 FE) 1-20 MG-MCG(24) per tablet; Take 1 tablet by mouth Daily.  Dispense: 28 tablet; Refill: 12             Follow Up   No follow-ups on file.  Patient was given instructions and counseling regarding her condition or for health maintenance advice. Please see specific information pulled into the AVS if appropriate.             "

## 2024-08-20 LAB — BACTERIA UR CULT: NORMAL

## 2024-08-21 LAB
GEN CATEG CVX/VAG CYTO-IMP: NORMAL
LAB AP CASE REPORT: NORMAL
LAB AP GYN ADDITIONAL INFORMATION: NORMAL
LAB AP GYN OTHER FINDINGS: NORMAL
Lab: NORMAL
PATH INTERP SPEC-IMP: NORMAL
STAT OF ADQ CVX/VAG CYTO-IMP: NORMAL

## 2025-01-16 ENCOUNTER — TELEPHONE (OUTPATIENT)
Age: 25
End: 2025-01-16
Payer: COMMERCIAL

## 2025-01-16 ENCOUNTER — OFFICE VISIT (OUTPATIENT)
Age: 25
End: 2025-01-16
Payer: COMMERCIAL

## 2025-01-16 VITALS
DIASTOLIC BLOOD PRESSURE: 88 MMHG | HEIGHT: 64 IN | BODY MASS INDEX: 40.75 KG/M2 | WEIGHT: 238.7 LBS | SYSTOLIC BLOOD PRESSURE: 138 MMHG

## 2025-01-16 DIAGNOSIS — Z30.2 ENCOUNTER FOR STERILIZATION: Primary | ICD-10-CM

## 2025-01-16 RX ORDER — SODIUM CHLORIDE 0.9 % (FLUSH) 0.9 %
10 SYRINGE (ML) INJECTION EVERY 12 HOURS SCHEDULED
OUTPATIENT
Start: 2025-01-16

## 2025-01-16 RX ORDER — SODIUM CHLORIDE 9 MG/ML
100 INJECTION, SOLUTION INTRAVENOUS CONTINUOUS
OUTPATIENT
Start: 2025-01-16 | End: 2025-01-16

## 2025-01-16 RX ORDER — SODIUM CHLORIDE 0.9 % (FLUSH) 0.9 %
1-10 SYRINGE (ML) INJECTION AS NEEDED
OUTPATIENT
Start: 2025-01-16

## 2025-01-16 RX ORDER — NORGESTIMATE AND ETHINYL ESTRADIOL 7DAYSX3 LO
1 KIT ORAL DAILY
Qty: 28 TABLET | Refills: 12 | Status: SHIPPED | OUTPATIENT
Start: 2025-01-16 | End: 2026-01-16

## 2025-01-16 RX ORDER — SODIUM CHLORIDE 9 MG/ML
40 INJECTION, SOLUTION INTRAVENOUS AS NEEDED
OUTPATIENT
Start: 2025-01-16

## 2025-01-16 NOTE — PROGRESS NOTES
"Chief Complaint  Consult (Pt here for consult to discuss tubal ligation, last pap 8-, normal.)    Subjective        Carmelita Danica Powers presents to [facility] to discuss LSC bilateral salpingectomy. Pt had ordered a nexplanon however she now desires permanent sterilization with LSC bilateral salpingectomy. Pt would like OCPs as she needs to sign tubal papers today. Discussed procedure.   History of Present Illness    Objective   Vital Signs:  /88   Ht 162.6 cm (64.02\")   Wt 108 kg (238 lb 11.2 oz)   BMI 40.95 kg/m²   Estimated body mass index is 40.95 kg/m² as calculated from the following:    Height as of this encounter: 162.6 cm (64.02\").    Weight as of this encounter: 108 kg (238 lb 11.2 oz).          Physical Exam   deferred  Result Review :                Assessment and Plan   Diagnoses and all orders for this visit:    1. Encounter for sterilization (Primary)  -     CBC & Differential; Future  -     Basic Metabolic Panel; Future  -     sodium chloride 0.9 % flush 10 mL  -     sodium chloride 0.9 % flush 1-10 mL  -     sodium chloride 0.9 % infusion 40 mL  -     sodium chloride 0.9 % infusion  -     ceFAZolin (ANCEF) 2,000 mg in sodium chloride 0.9 % 100 mL IVPB  -     Case Request; Standing  -     Case Request  25 y/o AAF here for consultation for LSC BS.  Pt desires permanent sterilization. Pt understands once her tubes are removed that she will be unable to have children. Pt would like OCPs to start until her surgery. RTC for preop visit as papers need to be signed for 30 days.   Other orders  -     Follow Anesthesia Guidelines / Protocol; Future  -     Follow Anesthesia Guidelines / Protocol; Standing  -     Verify / Perform Chlorhexidine Skin Prep; Standing  -     Provide NPO Instructions to Patient; Future  -     Chlorhexidine Skin Prep; Future  -     Insert Peripheral IV; Standing  -     Saline Lock & Maintain IV Access; Standing  -     Place Sequential Compression " Device; Standing  -     Maintain Sequential Compression Device; Standing  -     Norgestimate-Eth Estradiol (Ortho Tri-Cyclen Lo) 0.18/0.215/0.25 MG-25 MCG tablet; Take 1 tablet by mouth Daily.  Dispense: 28 tablet; Refill: 12             Follow Up   No follow-ups on file.  Patient was given instructions and counseling regarding her condition or for health maintenance advice. Please see specific information pulled into the AVS if appropriate.

## 2025-01-16 NOTE — TELEPHONE ENCOUNTER
Called pt, informed laparoscopic salpingectomy scheduled 2- arrive @ 0915, NPO after midnight, appt with Dr Ocampo 2- @ 0929, preop labs 2- @ 1115. Pt voiced understanding.

## 2025-01-27 ENCOUNTER — TELEPHONE (OUTPATIENT)
Age: 25
End: 2025-01-27
Payer: COMMERCIAL

## 2025-01-28 ENCOUNTER — TELEPHONE (OUTPATIENT)
Age: 25
End: 2025-01-28
Payer: COMMERCIAL

## 2025-01-28 NOTE — TELEPHONE ENCOUNTER
Called pt, informed arrival time of surgery scheduled 2- changed to 0830. Pt voiced understanding.

## 2025-02-04 ENCOUNTER — TELEPHONE (OUTPATIENT)
Age: 25
End: 2025-02-04
Payer: COMMERCIAL

## 2025-02-04 NOTE — TELEPHONE ENCOUNTER
Called pt to inform sx scheduled for 2- arrival time has changed to 1030. Pt voiced understanding.

## 2025-02-18 ENCOUNTER — TELEPHONE (OUTPATIENT)
Age: 25
End: 2025-02-18
Payer: COMMERCIAL

## 2025-02-18 NOTE — TELEPHONE ENCOUNTER
Arrival time for surgery scheduled 2- changed to 0800. Called pt, informed sx arrival time changed to 0800. Pt voiced understanding.

## 2025-02-24 ENCOUNTER — PRE-ADMISSION TESTING (OUTPATIENT)
Dept: PREADMISSION TESTING | Facility: HOSPITAL | Age: 25
End: 2025-02-24
Payer: COMMERCIAL

## 2025-02-24 ENCOUNTER — OFFICE VISIT (OUTPATIENT)
Age: 25
End: 2025-02-24
Payer: COMMERCIAL

## 2025-02-24 VITALS
HEIGHT: 64 IN | BODY MASS INDEX: 42.02 KG/M2 | SYSTOLIC BLOOD PRESSURE: 138 MMHG | WEIGHT: 246.1 LBS | DIASTOLIC BLOOD PRESSURE: 86 MMHG

## 2025-02-24 VITALS
SYSTOLIC BLOOD PRESSURE: 127 MMHG | BODY MASS INDEX: 46.05 KG/M2 | OXYGEN SATURATION: 98 % | HEIGHT: 62 IN | RESPIRATION RATE: 18 BRPM | DIASTOLIC BLOOD PRESSURE: 88 MMHG | WEIGHT: 250.22 LBS | HEART RATE: 97 BPM

## 2025-02-24 DIAGNOSIS — Z30.2 ENCOUNTER FOR STERILIZATION: Primary | ICD-10-CM

## 2025-02-24 PROCEDURE — 85025 COMPLETE CBC W/AUTO DIFF WBC: CPT | Performed by: OBSTETRICS & GYNECOLOGY

## 2025-02-24 PROCEDURE — 99024 POSTOP FOLLOW-UP VISIT: CPT | Performed by: OBSTETRICS & GYNECOLOGY

## 2025-02-24 PROCEDURE — 80048 BASIC METABOLIC PNL TOTAL CA: CPT | Performed by: OBSTETRICS & GYNECOLOGY

## 2025-02-24 NOTE — H&P (VIEW-ONLY)
"[facility]  Carmelita Powers  : 2000  MRN: [id number]  CSN: [id number]    History and Physical    Subjective   Carmelita Powers is a 24 y.o. year old  who presents for consultation about surgery due to undesired fertility. Pt desires permanent sterilization with LSC bilateral salpingectomy. Has signed the appropriate consents. Pt and I have discussed risks including infection, bleeding, damage to surrounding organs. Pt desires to proceed.    Past Medical History:   Diagnosis Date    Migraine 24    I get migraines really bad all throughout the day and night     Past Surgical History:   Procedure Laterality Date    TONSILLECTOMY       Social History    Tobacco Use      Smoking status: Never        Passive exposure: Never      Smokeless tobacco: Never      Current Outpatient Medications:     Norgestimate-Eth Estradiol (Ortho Tri-Cyclen Lo) 0.18/0.215/0.25 MG-25 MCG tablet, Take 1 tablet by mouth Daily., Disp: 28 tablet, Rfl: 12    No Known Allergies    Family History   Problem Relation Age of Onset    Breast cancer Neg Hx     Ovarian cancer Neg Hx     Uterine cancer Neg Hx     Colon cancer Neg Hx     Melanoma Neg Hx      Review of Systems   Constitutional:  Negative for activity change, appetite change and fatigue.   Respiratory:  Negative for cough, chest tightness, shortness of breath and wheezing.    Cardiovascular:  Negative for chest pain and palpitations.   Gastrointestinal:  Negative for constipation, diarrhea, nausea and vomiting.   Genitourinary:  Negative for dyspareunia, dysuria, menstrual problem, pelvic pain, vaginal bleeding, vaginal discharge and vaginal pain.         Objective   /86   Ht 162.6 cm (64.02\")   Wt 112 kg (246 lb 1.6 oz)   LMP 02/10/2025 (Exact Date)   BMI 42.22 kg/m²     Physical Exam   Physical Exam  Constitutional:       Appearance: Normal appearance.   Cardiovascular:      Rate and Rhythm: Normal rate and regular rhythm.      Pulses: Normal pulses.      " Heart sounds: Normal heart sounds.   Pulmonary:      Effort: Pulmonary effort is normal.      Breath sounds: Normal breath sounds.   Abdominal:      General: Abdomen is flat. Bowel sounds are normal.      Palpations: Abdomen is soft.   Musculoskeletal:      Cervical back: Normal range of motion and neck supple.   Neurological:      Mental Status: She is alert.   Psychiatric:         Mood and Affect: Mood normal.         Behavior: Behavior normal.         Labs  Lab Results   Component Value Date     08/18/2024    HGB 11.5 (L) 08/18/2024    HCT 38.5 08/18/2024    WBC 8.8 08/18/2024     08/18/2024    K 4.4 08/18/2024     08/18/2024    CO2 23 08/18/2024    BUN 12 08/18/2024    CREATININE 0.7 08/18/2024    GLUCOSE 96 08/18/2024    ALBUMIN 4.1 08/18/2024    CALCIUM 9.6 08/18/2024    AST 13 08/18/2024    ALT 7 08/18/2024    BILITOT 0.6 08/18/2024        Assessment & Plan    Diagnoses and all orders for this visit:    1. Encounter for sterilization (Primary)  Undesired fertility with desire for permanent sterilization. To the OR for LSC bilateral salpingectomy.       Ally Ocampo MD  2/24/2025  15:00 CST

## 2025-02-24 NOTE — PROGRESS NOTES
"[facility]  Carmelita Powers  : 2000  MRN: [id number]  CSN: [id number]    History and Physical    Subjective   Carmelita Powers is a 24 y.o. year old  who presents for consultation about surgery due to undesired fertility. Pt desires permanent sterilization with LSC bilateral salpingectomy. Has signed the appropriate consents. Pt and I have discussed risks including infection, bleeding, damage to surrounding organs. Pt desires to proceed.    Past Medical History:   Diagnosis Date    Migraine 24    I get migraines really bad all throughout the day and night     Past Surgical History:   Procedure Laterality Date    TONSILLECTOMY       Social History    Tobacco Use      Smoking status: Never        Passive exposure: Never      Smokeless tobacco: Never      Current Outpatient Medications:     Norgestimate-Eth Estradiol (Ortho Tri-Cyclen Lo) 0.18/0.215/0.25 MG-25 MCG tablet, Take 1 tablet by mouth Daily., Disp: 28 tablet, Rfl: 12    No Known Allergies    Family History   Problem Relation Age of Onset    Breast cancer Neg Hx     Ovarian cancer Neg Hx     Uterine cancer Neg Hx     Colon cancer Neg Hx     Melanoma Neg Hx      Review of Systems   Constitutional:  Negative for activity change, appetite change and fatigue.   Respiratory:  Negative for cough, chest tightness, shortness of breath and wheezing.    Cardiovascular:  Negative for chest pain and palpitations.   Gastrointestinal:  Negative for constipation, diarrhea, nausea and vomiting.   Genitourinary:  Negative for dyspareunia, dysuria, menstrual problem, pelvic pain, vaginal bleeding, vaginal discharge and vaginal pain.         Objective   /86   Ht 162.6 cm (64.02\")   Wt 112 kg (246 lb 1.6 oz)   LMP 02/10/2025 (Exact Date)   BMI 42.22 kg/m²     Physical Exam   Physical Exam  Constitutional:       Appearance: Normal appearance.   Cardiovascular:      Rate and Rhythm: Normal rate and regular rhythm.      Pulses: Normal pulses.      " Heart sounds: Normal heart sounds.   Pulmonary:      Effort: Pulmonary effort is normal.      Breath sounds: Normal breath sounds.   Abdominal:      General: Abdomen is flat. Bowel sounds are normal.      Palpations: Abdomen is soft.   Musculoskeletal:      Cervical back: Normal range of motion and neck supple.   Neurological:      Mental Status: She is alert.   Psychiatric:         Mood and Affect: Mood normal.         Behavior: Behavior normal.         Labs  Lab Results   Component Value Date     08/18/2024    HGB 11.5 (L) 08/18/2024    HCT 38.5 08/18/2024    WBC 8.8 08/18/2024     08/18/2024    K 4.4 08/18/2024     08/18/2024    CO2 23 08/18/2024    BUN 12 08/18/2024    CREATININE 0.7 08/18/2024    GLUCOSE 96 08/18/2024    ALBUMIN 4.1 08/18/2024    CALCIUM 9.6 08/18/2024    AST 13 08/18/2024    ALT 7 08/18/2024    BILITOT 0.6 08/18/2024        Assessment & Plan    Diagnoses and all orders for this visit:    1. Encounter for sterilization (Primary)  Undesired fertility with desire for permanent sterilization. To the OR for LSC bilateral salpingectomy.       Ally Ocampo MD  2/24/2025  15:00 CST

## 2025-02-24 NOTE — DISCHARGE INSTRUCTIONS
Preparing for Surgery  Follow these instructions before the procedure:  Several days or weeks before your procedure        Ask your health care provider about:  Changing or stopping your regular medicines. This is especially important if you are taking diabetes medicines or blood thinners.  Taking medicines such as aspirin and ibuprofen. These medicines can thin your blood. Do not take these medicines unless your health care provider tells you to take them.  Taking over-the-counter medicines, vitamins, herbs, and supplements.    Contact your surgeon if you:  Develop a fever of more than 100.4°F (38°C) or other feelings of illness during the 48 hours before your surgery.  Have symptoms that get worse.  Have questions or concerns about your surgery.  If you are going home the same day of your surgery you will need to arrange for a responsible adult, age 18 years old or older, to drive you home from the hospital and stay with you for 24 hours. Verification of the  will be made prior to any procedure requiring sedation. You may not go home in a taxi or any form of public transportation by yourself.     Day before your procedure      24 hours before your procedure DO NOT drink alcoholic beverages or smoke.  24 hours before your procedure STOP taking Erectile Dysfunction medication (i.e.,Cialis, Viagra)   You may be asked to shower with a germ-killing soap.  Day of your procedure         8 hours before your scheduled arrival time, STOP all food, any dairy products, and full liquids. This includes hard candy, chewing gum or mints. This is extremely important to prevent serious complications.     Up to 2 hours before your scheduled arrival time, you may have clear liquids no cream, powder, or pulp of any kind. Safe options are water, black coffee, plain tea, soda, Gatorade/Powerade, clear broth, apple juice.    2 hours before your scheduled arrival time, STOP drinking clear liquids.    You may need to take another  shower with a germ-killing soap before you leave home in the morning. Do not use perfumes, colognes, or body lotions.  Wear comfortable loose-fitting clothing.  Remove all jewelry including body piercing and rings, dark colored nail polish, and make up prior to arrival at the hospital. Leave all valuables at home.   Bring your hearing aids if you rely on them.  Do not wear contact lenses. If you wear eyeglasses remember to bring a case to store them in while you are in surgery.  Do not use denture adhesives since you will be asked to remove them during your surgery.    You do not need to bring your home medications into the hospital.   Bring your sleep apnea device with you on the day of your surgery (if this applies to you).  If you have an Inspire implant for sleep apnea, please bring the remote with you on the day of surgery.  If you wear portable oxygen, bring it with you.   If you are staying overnight, you may bring a bag of items you may need such as slippers, robe and a change of clothes for your discharge. You may want to leave these items in the car until you are ready for them since your family will take your belongings when you leave the pre-operative area.  Arrive at the hospital as scheduled by the office. You will be asked to arrive 2 hours prior to your surgery time in order to prepare for your procedure.  When you arrive at the hospital  Go to the registration desk located at the main entrance of the hospital.  After registration is completed, you will be given a beeper and a sticker sheet. Take the stickers to Outpatient Surgery and place in the tray at the end of the desk to notify the staff that you have arrived and registered.   Return to the lobby to wait. You are not always called back according to the time of arrival but rather the time your doctor will be ready.  When your beeper lights up and vibrates proceed through the double doors, under the stairs, and a member of the Outpatient Surgery  staff will escort you to your preoperative room.   How to Use Chlorhexidine Before Surgery  Chlorhexidine gluconate (CHG) is a germ-killing (antiseptic) solution that is used to clean the skin. It can get rid of the bacteria that normally live on the skin and can keep them away for about 24 hours. To clean your skin with CHG, you may be given:  A CHG solution to use in the shower or as part of a sponge bath.  A prepackaged cloth that contains CHG.  Cleaning your skin with CHG may help lower the risk for infection:  While you are staying in the intensive care unit of the hospital.  If you have a vascular access, such as a central line, to provide short-term or long-term access to your veins.  If you have a catheter to drain urine from your bladder.  If you are on a ventilator. A ventilator is a machine that helps you breathe by moving air in and out of your lungs.  After surgery.  What are the risks?  Risks of using CHG include:  A skin reaction.  Hearing loss, if CHG gets in your ears and you have a perforated eardrum.  Eye injury, if CHG gets in your eyes and is not rinsed out.  The CHG product catching fire.  Make sure that you avoid smoking and flames after applying CHG to your skin.  Do not use CHG:  If you have a chlorhexidine allergy or have previously reacted to chlorhexidine.  On babies younger than 2 months of age.  How to use CHG solution  Use CHG only as told by your health care provider, and follow the instructions on the label.  Use the full amount of CHG as directed. Usually, this is one bottle.  During a shower    Follow these steps when using CHG solution during a shower (unless your health care provider gives you different instructions):  Start the shower.  Use your normal soap and shampoo to wash your face and hair.  Turn off the shower or move out of the shower stream.  Pour the CHG onto a clean washcloth. Do not use any type of brush or rough-edged sponge.  Starting at your neck, lather your body  down to your toes. Make sure you follow these instructions:  If you will be having surgery, pay special attention to the part of your body where you will be having surgery. Scrub this area for at least 1 minute.  Do not use CHG on your head or face. If the solution gets into your ears or eyes, rinse them well with water.  Avoid your genital area.  Avoid any areas of skin that have broken skin, cuts, or scrapes.  Scrub your back and under your arms. Make sure to wash skin folds.  Let the lather sit on your skin for 1-2 minutes or as long as told by your health care provider.  Thoroughly rinse your entire body in the shower. Make sure that all body creases and crevices are rinsed well.  Dry off with a clean towel. Do not put any substances on your body afterward--such as powder, lotion, or perfume--unless you are told to do so by your health care provider. Only use lotions that are recommended by the .  Put on clean clothes or pajamas.  If it is the night before your surgery, sleep in clean sheets.     During a sponge bath  Follow these steps when using CHG solution during a sponge bath (unless your health care provider gives you different instructions):  Use your normal soap and shampoo to wash your face and hair.  Pour the CHG onto a clean washcloth.  Starting at your neck, lather your body down to your toes. Make sure you follow these instructions:  If you will be having surgery, pay special attention to the part of your body where you will be having surgery. Scrub this area for at least 1 minute.  Do not use CHG on your head or face. If the solution gets into your ears or eyes, rinse them well with water.  Avoid your genital area.  Avoid any areas of skin that have broken skin, cuts, or scrapes.  Scrub your back and under your arms. Make sure to wash skin folds.  Let the lather sit on your skin for 1-2 minutes or as long as told by your health care provider.  Using a different clean, wet washcloth,  thoroughly rinse your entire body. Make sure that all body creases and crevices are rinsed well.  Dry off with a clean towel. Do not put any substances on your body afterward--such as powder, lotion, or perfume--unless you are told to do so by your health care provider. Only use lotions that are recommended by the .  Put on clean clothes or pajamas.  If it is the night before your surgery, sleep in clean sheets.  How to use CHG prepackaged cloths  Only use CHG cloths as told by your health care provider, and follow the instructions on the label.  Use the CHG cloth on clean, dry skin.  Do not use the CHG cloth on your head or face unless your health care provider tells you to.  When washing with the CHG cloth:  Avoid your genital area.  Avoid any areas of skin that have broken skin, cuts, or scrapes.  Before surgery    Follow these steps when using a CHG cloth to clean before surgery (unless your health care provider gives you different instructions):  Using the CHG cloth, vigorously scrub the part of your body where you will be having surgery. Scrub using a back-and-forth motion for 3 minutes. The area on your body should be completely wet with CHG when you are done scrubbing.  Do not rinse. Discard the cloth and let the area air-dry. Do not put any substances on the area afterward, such as powder, lotion, or perfume.  Put on clean clothes or pajamas.  If it is the night before your surgery, sleep in clean sheets.     For general bathing  Follow these steps when using CHG cloths for general bathing (unless your health care provider gives you different instructions).  Use a separate CHG cloth for each area of your body. Make sure you wash between any folds of skin and between your fingers and toes. Wash your body in the following order, switching to a new cloth after each step:  The front of your neck, shoulders, and chest.  Both of your arms, under your arms, and your hands.  Your stomach and groin area,  avoiding the genitals.  Your right leg and foot.  Your left leg and foot.  The back of your neck, your back, and your buttocks.  Do not rinse. Discard the cloth and let the area air-dry. Do not put any substances on your body afterward--such as powder, lotion, or perfume--unless you are told to do so by your health care provider. Only use lotions that are recommended by the .  Put on clean clothes or pajamas.  Contact a health care provider if:  Your skin gets irritated after scrubbing.  You have questions about using your solution or cloth.  You swallow any chlorhexidine. Call your local poison control center (1-345.554.2008 in the [place]  Get help right away if:  Your eyes itch badly, or they become very red or swollen.  Your skin itches badly and is red or swollen.  Your hearing changes.  You have trouble seeing.  You have swelling or tingling in your mouth or throat.  You have trouble breathing.  These symptoms may represent a serious problem that is an emergency. Do not wait to see if the symptoms will go away. Get medical help right away. Call your local emergency services (902 in the [place] Do not drive yourself to the hospital.  Summary  Chlorhexidine gluconate (CHG) is a germ-killing (antiseptic) solution that is used to clean the skin. Cleaning your skin with CHG may help to lower your risk for infection.  You may be given CHG to use for bathing. It may be in a bottle or in a prepackaged cloth to use on your skin. Carefully follow your health care provider's instructions and the instructions on the product label.  Do not use CHG if you have a chlorhexidine allergy.  Contact your health care provider if your skin gets irritated after scrubbing.  This information is not intended to replace advice given to you by your health care provider. Make sure you discuss any questions you have with your health care provider.  Document Revised: 04/17/2023 Document Reviewed: 02/28/2022  Elsevier Patient  Education © 2023 [employer]

## 2025-02-27 ENCOUNTER — ANESTHESIA (OUTPATIENT)
Dept: PERIOP | Facility: HOSPITAL | Age: 25
End: 2025-02-27
Payer: COMMERCIAL

## 2025-02-27 ENCOUNTER — HOSPITAL ENCOUNTER (OUTPATIENT)
Facility: HOSPITAL | Age: 25
Setting detail: HOSPITAL OUTPATIENT SURGERY
Discharge: HOME | End: 2025-02-27
Attending: OBSTETRICS & GYNECOLOGY | Admitting: OBSTETRICS & GYNECOLOGY
Payer: COMMERCIAL

## 2025-02-27 ENCOUNTER — ANESTHESIA EVENT (OUTPATIENT)
Dept: PERIOP | Facility: HOSPITAL | Age: 25
End: 2025-02-27
Payer: COMMERCIAL

## 2025-02-27 VITALS
TEMPERATURE: 98.6 F | OXYGEN SATURATION: 100 % | HEART RATE: 81 BPM | SYSTOLIC BLOOD PRESSURE: 128 MMHG | RESPIRATION RATE: 16 BRPM | DIASTOLIC BLOOD PRESSURE: 88 MMHG

## 2025-02-27 DIAGNOSIS — Z30.2 ENCOUNTER FOR STERILIZATION: ICD-10-CM

## 2025-02-27 LAB — B-HCG UR QL: NEGATIVE

## 2025-02-27 PROCEDURE — 25010000002 CEFAZOLIN PER 500 MG: Performed by: OBSTETRICS & GYNECOLOGY

## 2025-02-27 PROCEDURE — 25010000002 SUGAMMADEX 200 MG/2ML SOLUTION

## 2025-02-27 PROCEDURE — 25010000002 MIDAZOLAM PER 1MG: Performed by: ANESTHESIOLOGY

## 2025-02-27 PROCEDURE — 25010000002 PROPOFOL 10 MG/ML EMULSION

## 2025-02-27 PROCEDURE — 25810000003 LACTATED RINGERS PER 1000 ML: Performed by: OBSTETRICS & GYNECOLOGY

## 2025-02-27 PROCEDURE — 25010000002 FENTANYL CITRATE (PF) 100 MCG/2ML SOLUTION

## 2025-02-27 PROCEDURE — 25010000002 KETOROLAC TROMETHAMINE PER 15 MG

## 2025-02-27 PROCEDURE — 81025 URINE PREGNANCY TEST: CPT | Performed by: OBSTETRICS & GYNECOLOGY

## 2025-02-27 PROCEDURE — 25010000002 DEXAMETHASONE PER 1 MG

## 2025-02-27 PROCEDURE — 58661 LAPAROSCOPY REMOVE ADNEXA: CPT | Performed by: OBSTETRICS & GYNECOLOGY

## 2025-02-27 PROCEDURE — 88302 TISSUE EXAM BY PATHOLOGIST: CPT | Performed by: OBSTETRICS & GYNECOLOGY

## 2025-02-27 PROCEDURE — 25010000002 ONDANSETRON PER 1 MG

## 2025-02-27 PROCEDURE — 25010000002 DEXAMETHASONE PER 1 MG: Performed by: ANESTHESIOLOGY

## 2025-02-27 RX ORDER — MAGNESIUM HYDROXIDE 1200 MG/15ML
LIQUID ORAL AS NEEDED
Status: DISCONTINUED | OUTPATIENT
Start: 2025-02-27 | End: 2025-02-27 | Stop reason: HOSPADM

## 2025-02-27 RX ORDER — SODIUM CHLORIDE 0.9 % (FLUSH) 0.9 %
3 SYRINGE (ML) INJECTION EVERY 12 HOURS SCHEDULED
Status: DISCONTINUED | OUTPATIENT
Start: 2025-02-27 | End: 2025-02-27 | Stop reason: HOSPADM

## 2025-02-27 RX ORDER — HYDROCODONE BITARTRATE AND ACETAMINOPHEN 10; 325 MG/1; MG/1
1 TABLET ORAL EVERY 4 HOURS PRN
Status: DISCONTINUED | OUTPATIENT
Start: 2025-02-27 | End: 2025-02-27 | Stop reason: HOSPADM

## 2025-02-27 RX ORDER — DEXAMETHASONE SODIUM PHOSPHATE 4 MG/ML
4 INJECTION, SOLUTION INTRA-ARTICULAR; INTRALESIONAL; INTRAMUSCULAR; INTRAVENOUS; SOFT TISSUE ONCE AS NEEDED
Status: COMPLETED | OUTPATIENT
Start: 2025-02-27 | End: 2025-02-27

## 2025-02-27 RX ORDER — OXYCODONE AND ACETAMINOPHEN 5; 325 MG/1; MG/1
1 TABLET ORAL EVERY 6 HOURS PRN
Qty: 8 TABLET | Refills: 0 | Status: SHIPPED | OUTPATIENT
Start: 2025-02-27

## 2025-02-27 RX ORDER — SODIUM CHLORIDE 9 MG/ML
40 INJECTION, SOLUTION INTRAVENOUS AS NEEDED
Status: DISCONTINUED | OUTPATIENT
Start: 2025-02-27 | End: 2025-02-27 | Stop reason: HOSPADM

## 2025-02-27 RX ORDER — SODIUM CHLORIDE 0.9 % (FLUSH) 0.9 %
10 SYRINGE (ML) INJECTION EVERY 12 HOURS SCHEDULED
Status: DISCONTINUED | OUTPATIENT
Start: 2025-02-27 | End: 2025-02-27 | Stop reason: HOSPADM

## 2025-02-27 RX ORDER — OXYCODONE AND ACETAMINOPHEN 5; 325 MG/1; MG/1
1 TABLET ORAL ONCE AS NEEDED
Status: DISCONTINUED | OUTPATIENT
Start: 2025-02-27 | End: 2025-02-27 | Stop reason: HOSPADM

## 2025-02-27 RX ORDER — ONDANSETRON 2 MG/ML
INJECTION INTRAMUSCULAR; INTRAVENOUS AS NEEDED
Status: DISCONTINUED | OUTPATIENT
Start: 2025-02-27 | End: 2025-02-27 | Stop reason: SURG

## 2025-02-27 RX ORDER — FENTANYL CITRATE 50 UG/ML
50 INJECTION, SOLUTION INTRAMUSCULAR; INTRAVENOUS
Status: DISCONTINUED | OUTPATIENT
Start: 2025-02-27 | End: 2025-02-27 | Stop reason: HOSPADM

## 2025-02-27 RX ORDER — ONDANSETRON 2 MG/ML
4 INJECTION INTRAMUSCULAR; INTRAVENOUS ONCE AS NEEDED
Status: DISCONTINUED | OUTPATIENT
Start: 2025-02-27 | End: 2025-02-27 | Stop reason: HOSPADM

## 2025-02-27 RX ORDER — SODIUM CHLORIDE 0.9 % (FLUSH) 0.9 %
3-10 SYRINGE (ML) INJECTION AS NEEDED
Status: DISCONTINUED | OUTPATIENT
Start: 2025-02-27 | End: 2025-02-27 | Stop reason: HOSPADM

## 2025-02-27 RX ORDER — FLUMAZENIL 0.1 MG/ML
0.2 INJECTION INTRAVENOUS AS NEEDED
Status: DISCONTINUED | OUTPATIENT
Start: 2025-02-27 | End: 2025-02-27 | Stop reason: HOSPADM

## 2025-02-27 RX ORDER — MIDAZOLAM HYDROCHLORIDE 2 MG/2ML
2 INJECTION, SOLUTION INTRAMUSCULAR; INTRAVENOUS
Status: DISCONTINUED | OUTPATIENT
Start: 2025-02-27 | End: 2025-02-27 | Stop reason: HOSPADM

## 2025-02-27 RX ORDER — DEXAMETHASONE SODIUM PHOSPHATE 4 MG/ML
INJECTION, SOLUTION INTRA-ARTICULAR; INTRALESIONAL; INTRAMUSCULAR; INTRAVENOUS; SOFT TISSUE AS NEEDED
Status: DISCONTINUED | OUTPATIENT
Start: 2025-02-27 | End: 2025-02-27 | Stop reason: SURG

## 2025-02-27 RX ORDER — SCOPOLAMINE 1 MG/3D
1 PATCH, EXTENDED RELEASE TRANSDERMAL ONCE
Status: DISCONTINUED | OUTPATIENT
Start: 2025-02-27 | End: 2025-02-27 | Stop reason: HOSPADM

## 2025-02-27 RX ORDER — LABETALOL HYDROCHLORIDE 5 MG/ML
5 INJECTION, SOLUTION INTRAVENOUS
Status: DISCONTINUED | OUTPATIENT
Start: 2025-02-27 | End: 2025-02-27 | Stop reason: HOSPADM

## 2025-02-27 RX ORDER — SODIUM CHLORIDE 0.9 % (FLUSH) 0.9 %
3 SYRINGE (ML) INJECTION AS NEEDED
Status: DISCONTINUED | OUTPATIENT
Start: 2025-02-27 | End: 2025-02-27 | Stop reason: HOSPADM

## 2025-02-27 RX ORDER — SODIUM CHLORIDE 0.9 % (FLUSH) 0.9 %
1-10 SYRINGE (ML) INJECTION AS NEEDED
Status: DISCONTINUED | OUTPATIENT
Start: 2025-02-27 | End: 2025-02-27 | Stop reason: HOSPADM

## 2025-02-27 RX ORDER — SODIUM CHLORIDE, SODIUM LACTATE, POTASSIUM CHLORIDE, CALCIUM CHLORIDE 600; 310; 30; 20 MG/100ML; MG/100ML; MG/100ML; MG/100ML
100 INJECTION, SOLUTION INTRAVENOUS CONTINUOUS
Status: DISCONTINUED | OUTPATIENT
Start: 2025-02-27 | End: 2025-02-27 | Stop reason: HOSPADM

## 2025-02-27 RX ORDER — FENTANYL CITRATE 50 UG/ML
INJECTION, SOLUTION INTRAMUSCULAR; INTRAVENOUS AS NEEDED
Status: DISCONTINUED | OUTPATIENT
Start: 2025-02-27 | End: 2025-02-27 | Stop reason: SURG

## 2025-02-27 RX ORDER — SODIUM CHLORIDE, SODIUM LACTATE, POTASSIUM CHLORIDE, CALCIUM CHLORIDE 600; 310; 30; 20 MG/100ML; MG/100ML; MG/100ML; MG/100ML
1000 INJECTION, SOLUTION INTRAVENOUS CONTINUOUS
Status: DISCONTINUED | OUTPATIENT
Start: 2025-02-27 | End: 2025-02-27 | Stop reason: HOSPADM

## 2025-02-27 RX ORDER — HYDROCODONE BITARTRATE AND ACETAMINOPHEN 5; 325 MG/1; MG/1
1 TABLET ORAL EVERY 4 HOURS PRN
Status: DISCONTINUED | OUTPATIENT
Start: 2025-02-27 | End: 2025-02-27 | Stop reason: HOSPADM

## 2025-02-27 RX ORDER — IBUPROFEN 600 MG/1
600 TABLET, FILM COATED ORAL EVERY 6 HOURS PRN
Status: DISCONTINUED | OUTPATIENT
Start: 2025-02-27 | End: 2025-02-27 | Stop reason: HOSPADM

## 2025-02-27 RX ORDER — KETOROLAC TROMETHAMINE 30 MG/ML
INJECTION, SOLUTION INTRAMUSCULAR; INTRAVENOUS AS NEEDED
Status: DISCONTINUED | OUTPATIENT
Start: 2025-02-27 | End: 2025-02-27 | Stop reason: SURG

## 2025-02-27 RX ORDER — ROCURONIUM BROMIDE 10 MG/ML
INJECTION, SOLUTION INTRAVENOUS AS NEEDED
Status: DISCONTINUED | OUTPATIENT
Start: 2025-02-27 | End: 2025-02-27 | Stop reason: SURG

## 2025-02-27 RX ORDER — ACETAMINOPHEN 500 MG
1000 TABLET ORAL ONCE
Status: COMPLETED | OUTPATIENT
Start: 2025-02-27 | End: 2025-02-27

## 2025-02-27 RX ORDER — SODIUM CHLORIDE 9 MG/ML
100 INJECTION, SOLUTION INTRAVENOUS CONTINUOUS
Status: DISPENSED | OUTPATIENT
Start: 2025-02-27 | End: 2025-02-27

## 2025-02-27 RX ORDER — LIDOCAINE HYDROCHLORIDE 10 MG/ML
0.5 INJECTION, SOLUTION EPIDURAL; INFILTRATION; INTRACAUDAL; PERINEURAL ONCE AS NEEDED
Status: DISCONTINUED | OUTPATIENT
Start: 2025-02-27 | End: 2025-02-27 | Stop reason: HOSPADM

## 2025-02-27 RX ORDER — NALOXONE HCL 0.4 MG/ML
0.4 VIAL (ML) INJECTION AS NEEDED
Status: DISCONTINUED | OUTPATIENT
Start: 2025-02-27 | End: 2025-02-27 | Stop reason: HOSPADM

## 2025-02-27 RX ORDER — PROPOFOL 10 MG/ML
VIAL (ML) INTRAVENOUS AS NEEDED
Status: DISCONTINUED | OUTPATIENT
Start: 2025-02-27 | End: 2025-02-27 | Stop reason: SURG

## 2025-02-27 RX ORDER — BUPIVACAINE HYDROCHLORIDE AND EPINEPHRINE 2.5; 5 MG/ML; UG/ML
INJECTION, SOLUTION INFILTRATION; PERINEURAL AS NEEDED
Status: DISCONTINUED | OUTPATIENT
Start: 2025-02-27 | End: 2025-02-27 | Stop reason: HOSPADM

## 2025-02-27 RX ADMIN — DEXAMETHASONE SODIUM PHOSPHATE 4 MG: 4 INJECTION, SOLUTION INTRAMUSCULAR; INTRAVENOUS at 11:44

## 2025-02-27 RX ADMIN — PROPOFOL 200 MG: 10 INJECTION, EMULSION INTRAVENOUS at 11:31

## 2025-02-27 RX ADMIN — FENTANYL CITRATE 100 MCG: 50 INJECTION, SOLUTION INTRAMUSCULAR; INTRAVENOUS at 11:27

## 2025-02-27 RX ADMIN — CEFAZOLIN 2000 MG: 2 INJECTION, POWDER, FOR SOLUTION INTRAMUSCULAR; INTRAVENOUS at 11:36

## 2025-02-27 RX ADMIN — SODIUM CHLORIDE, POTASSIUM CHLORIDE, SODIUM LACTATE AND CALCIUM CHLORIDE 1000 ML: 600; 310; 30; 20 INJECTION, SOLUTION INTRAVENOUS at 08:56

## 2025-02-27 RX ADMIN — FENTANYL CITRATE 50 MCG: 50 INJECTION, SOLUTION INTRAMUSCULAR; INTRAVENOUS at 11:43

## 2025-02-27 RX ADMIN — ROCURONIUM 50 MG: 50 INJECTION, SOLUTION INTRAVENOUS at 11:31

## 2025-02-27 RX ADMIN — SUGAMMADEX 200 MG: 100 INJECTION, SOLUTION INTRAVENOUS at 12:05

## 2025-02-27 RX ADMIN — SCOPOLAMINE 1 PATCH: 1.5 PATCH, EXTENDED RELEASE TRANSDERMAL at 11:19

## 2025-02-27 RX ADMIN — KETOROLAC TROMETHAMINE 30 MG: 30 INJECTION, SOLUTION INTRAMUSCULAR; INTRAVENOUS at 12:02

## 2025-02-27 RX ADMIN — FENTANYL CITRATE 50 MCG: 50 INJECTION, SOLUTION INTRAMUSCULAR; INTRAVENOUS at 11:54

## 2025-02-27 RX ADMIN — ACETAMINOPHEN 1000 MG: 500 TABLET, FILM COATED ORAL at 11:19

## 2025-02-27 RX ADMIN — DEXAMETHASONE SODIUM PHOSPHATE 4 MG: 4 INJECTION, SOLUTION INTRA-ARTICULAR; INTRALESIONAL; INTRAMUSCULAR; INTRAVENOUS; SOFT TISSUE at 11:20

## 2025-02-27 RX ADMIN — ONDANSETRON 4 MG: 2 INJECTION INTRAMUSCULAR; INTRAVENOUS at 12:02

## 2025-02-27 RX ADMIN — MIDAZOLAM HYDROCHLORIDE 2 MG: 1 INJECTION, SOLUTION INTRAMUSCULAR; INTRAVENOUS at 11:19

## 2025-02-27 NOTE — ANESTHESIA PROCEDURE NOTES
Airway  Urgency: elective    Date/Time: 2/27/2025 11:32 AM  Airway not difficult    General Information and Staff    Patient location during procedure: OR    Indications and Patient Condition  Indications for airway management: airway protection    Preoxygenated: yes  MILS maintained throughout  Mask difficulty assessment: 2 - vent by mask + OA or adjuvant +/- NMBA    Final Airway Details  Final airway type: endotracheal airway      Successful airway: ETT  Cuffed: yes   Successful intubation technique: video laryngoscopy  Facilitating devices/methods: intubating stylet  Endotracheal tube insertion site: oral  Blade: Gonzáles  Blade size: 3  ETT size (mm): 7.0  Cormack-Lehane Classification: grade I - full view of glottis  Placement verified by: chest auscultation and capnometry   Cuff volume (mL): 8  Measured from: teeth  ETT/EBT  to teeth (cm): 22  Number of attempts at approach: 1  Assessment: lips, teeth, and gum same as pre-op and atraumatic intubation

## 2025-02-27 NOTE — ANESTHESIA PREPROCEDURE EVALUATION
Anesthesia Evaluation     Patient summary reviewed   no history of anesthetic complications:   NPO Solid Status: > 8 hours  NPO Liquid Status: > 8 hours           Airway   Mallampati: II  TM distance: >3 FB  No difficulty expected  Dental - normal exam     Pulmonary - negative pulmonary ROS   (-) asthma, sleep apnea, not a smoker  Cardiovascular   Exercise tolerance: good (4-7 METS)    (-) hypertension, past MI, dysrhythmias, cardiac stents, hyperlipidemia      Neuro/Psych  (-) seizures, TIA, CVA  GI/Hepatic/Renal/Endo    (+) morbid obesity  (-) liver disease, no renal disease, diabetes    Musculoskeletal     Abdominal    Substance History      OB/GYN    (+) pregnancy induced hypertension (no meds currently, during pregnancy- resolved)        Other                    Anesthesia Plan    ASA 3     general     (Discussed HTN finding with patient with history of PIH. States she is nervoux. Encouraged PCP followup)  intravenous induction     Anesthetic plan, risks, benefits, and alternatives have been provided, discussed and informed consent has been obtained with: patient.    CODE STATUS:

## 2025-02-27 NOTE — ANESTHESIA POSTPROCEDURE EVALUATION
Patient: Carmelita Powers    Procedure Summary       Date: 02/27/25 Room / Location:  [facility] OR  /  [facility] OR    Anesthesia Start: 1126 Anesthesia Stop: 1226    Procedure: SALPINGECTOMY LAPAROSCOPIC (removal of both fallopian tubes with the camera) (Bilateral: Abdomen) Diagnosis:       Encounter for sterilization      (Encounter for sterilization [Z30.2])    Surgeons: Ally Ocampo MD Provider: Siddhartha Tovar CRNA    Anesthesia Type: general ASA Status: 3            Anesthesia Type: general    Vitals  Vitals Value Taken Time   /80 02/27/25 1301   Temp 98.6 °F (37 °C) 02/27/25 1300   Pulse 98 02/27/25 1304   Resp 16 02/27/25 1300   SpO2 100 % 02/27/25 1301   Vitals shown include unfiled device data.        Post Anesthesia Care and Evaluation    Patient location during evaluation: PACU  Patient participation: complete - patient participated  Level of consciousness: awake and alert  Pain management: adequate    Airway patency: patent  Anesthetic complications: No anesthetic complications  PONV Status: none  Cardiovascular status: acceptable and hemodynamically stable  Respiratory status: acceptable  Hydration status: acceptable    Comments: Blood pressure 128/88, pulse 81, temperature 98.6 °F (37 °C), temperature source Temporal, resp. rate 16, last menstrual period 02/10/2025, SpO2 100%, not currently breastfeeding.    Patient discharged from PACU based upon Allyson score. Please see RN notes for further details

## 2025-02-27 NOTE — OP NOTE
Arielle Powers  : 2000  MRN: [id number]  CSN: [id number]  Date: 2025    Operative Note    SALPINGECTOMY LAPAROSCOPIC      Pre-op Diagnosis:  Encounter for sterilization [Z30.2]   Post-op Diagnosis:  Post-Op Diagnosis Codes:     * Encounter for sterilization [Z30.2]   Procedure: Procedure(s):  SALPINGECTOMY LAPAROSCOPIC (removal of both fallopian tubes with the camera)   Surgeon: Surgeon(s):  Ally Ocampo MD       Anesthesia: General   Estimated Blood Loss: 10   mls   Fluids: 800   mls   UOP: 300   mls   ABx:    Specimens:  Bilateral fallopian tubes   Findings: Normal uterus, bilateral tubes and ovaries   Complications: none     Description of Procedure:      After informed consent was obtained, the patient was taken to the operating room, where general anesthesia was administered. [facility], and her abdomen, perineum and vagina were prepped and draped in normal sterile fashion. A speculum was placed in the vagina and a Thinkglue uterine manipulator placed through the cervical os and clamped to the anterior lip of the cervix.  Her bladder drained with a metal catheter.   The patient's abdomen was insufflated using a Veress needle above the umbilicus.  Following appropriate insufflation, a 5 mm Optiview trocar was used at the same location.  The abdomen was surveyed and found to be free of any adhesive disease or scar tissue to the anterior abdominal wall.  An additional 5 mm trocar was placed in the LLQ. The patient was then placed in Trendelenburg position and the bowel swept out of the pelvis with a  [employer] grasper.  The left fallopian tube was grasp with the  Gyrus harmonic scalpel device which was used to dissect it away from the adjacent ovary and transect across the diameter of the tube near the cornua of the uterus.  A before-and-after picture was taken on each side.  All surgical pedicles were checked for hemostasis.   The identical procedure was then performed on the right fallopian tube.  The patient's uterus, both ovaries, and both fallopian tubes all appeared completely normal.  There was no free fluid or blood in the patient's pelvis.  The laparoscope was removed and the gas allowed to escape the abdomen through the umbilical port.  Trochars were removed.   Subcutaneous tissue at all incisions was reapproximated with interrupted stitches using 3-0 Vicryl.  The instruments were then removed from the vagina and the [place] site noted to be hemostatic.         The patient was then awakened and taken to the recovery room in stable condition.  She tolerated the procedure well and without complications.  All sponge needle and instrument counts were correct times 2 per the OR staff.      Ally Ocampo MD   2/27/2025  12:10 CST

## 2025-03-03 LAB
CYTO UR: NORMAL
LAB AP CASE REPORT: NORMAL
Lab: NORMAL
PATH REPORT.FINAL DX SPEC: NORMAL
PATH REPORT.GROSS SPEC: NORMAL

## 2025-03-12 ENCOUNTER — OFFICE VISIT (OUTPATIENT)
Age: 25
End: 2025-03-12
Payer: COMMERCIAL

## 2025-03-12 VITALS
BODY MASS INDEX: 45.69 KG/M2 | SYSTOLIC BLOOD PRESSURE: 134 MMHG | DIASTOLIC BLOOD PRESSURE: 84 MMHG | WEIGHT: 248.3 LBS | HEIGHT: 62 IN

## 2025-03-12 DIAGNOSIS — Z09 FOLLOW-UP EXAMINATION, FOLLOWING OTHER SURGERY: Primary | ICD-10-CM

## 2025-03-12 PROCEDURE — 99024 POSTOP FOLLOW-UP VISIT: CPT | Performed by: OBSTETRICS & GYNECOLOGY

## 2025-03-12 NOTE — PROGRESS NOTES
"Chief Complaint  Post-op Follow-up (Pt here her 2 wk postop, laparoscopic salpingectomy 2-. Pt denies any problems today.)    Subjective        Carmelita Danica Powers presents to [facility] for post op visit. Doing well. Normal bowel and bladder function. Denies pain. Pathology two benign fallopian tubes.   History of Present Illness    Objective   Vital Signs:  /84   Ht 157.5 cm (62.01\")   Wt 113 kg (248 lb 4.8 oz)   BMI 45.40 kg/m²   Estimated body mass index is 45.4 kg/m² as calculated from the following:    Height as of this encounter: 157.5 cm (62.01\").    Weight as of this encounter: 113 kg (248 lb 4.8 oz).          Physical Exam   Abdomen: soft/Nt/Nd normal BS, incisions well healed  Result Review :                Assessment and Plan   Diagnoses and all orders for this visit:    1. Follow-up examination, following other surgery (Primary)  Post op LSC BS, doing well. Resume all normal activities RTC yearly.            Follow Up   No follow-ups on file.  Patient was given instructions and counseling regarding her condition or for health maintenance advice. Please see specific information pulled into the AVS if appropriate.             "

## (undated) DEVICE — ADHS LIQ MASTISOL 2/3ML

## (undated) DEVICE — DRSNG SURESITE WNDW 2.38X2.75

## (undated) DEVICE — TRAP FLD MINIVAC MEGADYNE 100ML

## (undated) DEVICE — PK LAP GYN 30

## (undated) DEVICE — GLV SURG SENSICARE W/ALOE PF LF 6.5 STRL

## (undated) DEVICE — CLTH CLENS READYCLEANSE PERI CARE PK/5

## (undated) DEVICE — ENDOPATH XCEL WITH OPTIVIEW TECHNOLOGY UNIVERSAL TROCAR STABILITY SLEEVES: Brand: ENDOPATH XCEL OPTIVIEW

## (undated) DEVICE — STRIP CLS WND CURAD MEDI/STRIP HYPOALLERG 0.25X4IN PK/10

## (undated) DEVICE — SUT MNCRYL 4/0 PS2 27IN UD MCP426H

## (undated) DEVICE — 4-PORT MANIFOLD: Brand: NEPTUNE 2

## (undated) DEVICE — HARMONIC 700 SHEARS, ADVANCED HEMOSTASIS: Brand: HARMONIC

## (undated) DEVICE — ENDOPATH XCEL WITH OPTIVIEW TECHNOLOGY BLADELESS TROCARS WITH STABILITY SLEEVES: Brand: ENDOPATH XCEL OPTIVIEW

## (undated) DEVICE — GAUZE,SPONGE,2"X2",8PLY,STERILE,LF,2'S: Brand: MEDLINE